# Patient Record
Sex: FEMALE | Race: OTHER | Employment: FULL TIME | URBAN - METROPOLITAN AREA
[De-identification: names, ages, dates, MRNs, and addresses within clinical notes are randomized per-mention and may not be internally consistent; named-entity substitution may affect disease eponyms.]

---

## 2017-01-16 ENCOUNTER — HOSPITAL ENCOUNTER (EMERGENCY)
Facility: HOSPITAL | Age: 47
Discharge: HOME | End: 2017-01-16
Attending: EMERGENCY MEDICINE
Payer: MEDICAID

## 2017-01-16 ENCOUNTER — APPOINTMENT (OUTPATIENT)
Dept: GENERAL RADIOLOGY | Facility: HOSPITAL | Age: 47
End: 2017-01-16
Attending: EMERGENCY MEDICINE
Payer: MEDICAID

## 2017-01-16 VITALS
BODY MASS INDEX: 36.96 KG/M2 | WEIGHT: 230 LBS | DIASTOLIC BLOOD PRESSURE: 66 MMHG | SYSTOLIC BLOOD PRESSURE: 102 MMHG | HEIGHT: 66 IN | TEMPERATURE: 98 F | OXYGEN SATURATION: 99 % | RESPIRATION RATE: 16 BRPM | HEART RATE: 68 BPM

## 2017-01-16 DIAGNOSIS — G56.02 CARPAL TUNNEL SYNDROME OF LEFT WRIST: ICD-10-CM

## 2017-01-16 DIAGNOSIS — M54.12 CERVICAL RADICULOPATHY: Primary | ICD-10-CM

## 2017-01-16 LAB
ALBUMIN SERPL-MCNC: 4.1 G/DL (ref 3.5–4.8)
ALP LIVER SERPL-CCNC: 87 U/L (ref 39–100)
ALT SERPL-CCNC: 21 U/L (ref 14–54)
AST SERPL-CCNC: 15 U/L (ref 15–41)
ATRIAL RATE: 78 BPM
BASOPHILS # BLD AUTO: 0.05 X10(3) UL (ref 0–0.1)
BASOPHILS NFR BLD AUTO: 0.5 %
BILIRUB SERPL-MCNC: 0.4 MG/DL (ref 0.1–2)
BUN BLD-MCNC: 9 MG/DL (ref 8–20)
CALCIUM BLD-MCNC: 9.1 MG/DL (ref 8.3–10.3)
CHLORIDE: 103 MMOL/L (ref 101–111)
CO2: 27 MMOL/L (ref 22–32)
CREAT BLD-MCNC: 0.72 MG/DL (ref 0.55–1.02)
EOSINOPHIL # BLD AUTO: 0.15 X10(3) UL (ref 0–0.3)
EOSINOPHIL NFR BLD AUTO: 1.4 %
ERYTHROCYTE [DISTWIDTH] IN BLOOD BY AUTOMATED COUNT: 13.8 % (ref 11.5–16)
GLUCOSE BLD-MCNC: 111 MG/DL (ref 70–99)
HCT VFR BLD AUTO: 40.4 % (ref 34–50)
HGB BLD-MCNC: 13.8 G/DL (ref 12–16)
IMMATURE GRANULOCYTE COUNT: 0.03 X10(3) UL (ref 0–1)
IMMATURE GRANULOCYTE RATIO %: 0.3 %
LYMPHOCYTES # BLD AUTO: 2.14 X10(3) UL (ref 0.9–4)
LYMPHOCYTES NFR BLD AUTO: 19.3 %
M PROTEIN MFR SERPL ELPH: 8.4 G/DL (ref 6.1–8.3)
MCH RBC QN AUTO: 28.2 PG (ref 27–33.2)
MCHC RBC AUTO-ENTMCNC: 34.2 G/DL (ref 31–37)
MCV RBC AUTO: 82.4 FL (ref 81–100)
MONOCYTES # BLD AUTO: 1.08 X10(3) UL (ref 0.1–0.6)
MONOCYTES NFR BLD AUTO: 9.7 %
NEUTROPHIL ABS PRELIM: 7.65 X10 (3) UL (ref 1.3–6.7)
NEUTROPHILS # BLD AUTO: 7.65 X10(3) UL (ref 1.3–6.7)
NEUTROPHILS NFR BLD AUTO: 68.8 %
P AXIS: 42 DEGREES
P-R INTERVAL: 148 MS
PLATELET # BLD AUTO: 267 10(3)UL (ref 150–450)
POTASSIUM SERPL-SCNC: 3.6 MMOL/L (ref 3.6–5.1)
Q-T INTERVAL: 396 MS
QRS DURATION: 84 MS
QTC CALCULATION (BEZET): 451 MS
R AXIS: 67 DEGREES
RBC # BLD AUTO: 4.9 X10(6)UL (ref 3.8–5.1)
RED CELL DISTRIBUTION WIDTH-SD: 41.1 FL (ref 35.1–46.3)
SODIUM SERPL-SCNC: 139 MMOL/L (ref 136–144)
T AXIS: 44 DEGREES
TROPONIN: <0.046 NG/ML (ref ?–0.05)
VENTRICULAR RATE: 78 BPM
WBC # BLD AUTO: 11.1 X10(3) UL (ref 4–13)

## 2017-01-16 PROCEDURE — 85025 COMPLETE CBC W/AUTO DIFF WBC: CPT | Performed by: EMERGENCY MEDICINE

## 2017-01-16 PROCEDURE — 96375 TX/PRO/DX INJ NEW DRUG ADDON: CPT

## 2017-01-16 PROCEDURE — 99285 EMERGENCY DEPT VISIT HI MDM: CPT

## 2017-01-16 PROCEDURE — 93010 ELECTROCARDIOGRAM REPORT: CPT

## 2017-01-16 PROCEDURE — 93005 ELECTROCARDIOGRAM TRACING: CPT

## 2017-01-16 PROCEDURE — 80053 COMPREHEN METABOLIC PANEL: CPT | Performed by: EMERGENCY MEDICINE

## 2017-01-16 PROCEDURE — 96374 THER/PROPH/DIAG INJ IV PUSH: CPT

## 2017-01-16 PROCEDURE — 71010 XR CHEST AP PORTABLE  (CPT=71010): CPT

## 2017-01-16 PROCEDURE — 84484 ASSAY OF TROPONIN QUANT: CPT | Performed by: EMERGENCY MEDICINE

## 2017-01-16 RX ORDER — HYDROCODONE BITARTRATE AND ACETAMINOPHEN 5; 325 MG/1; MG/1
1 TABLET ORAL EVERY 6 HOURS PRN
Qty: 10 TABLET | Refills: 0 | Status: ON HOLD | OUTPATIENT
Start: 2017-01-16 | End: 2017-05-24

## 2017-01-16 RX ORDER — KETOROLAC TROMETHAMINE 10 MG/1
10 TABLET, FILM COATED ORAL EVERY 6 HOURS PRN
Qty: 20 TABLET | Refills: 0 | Status: SHIPPED | OUTPATIENT
Start: 2017-01-16 | End: 2017-01-21

## 2017-01-16 RX ORDER — ASPIRIN 81 MG/1
324 TABLET, CHEWABLE ORAL ONCE
Status: COMPLETED | OUTPATIENT
Start: 2017-01-16 | End: 2017-01-16

## 2017-01-16 RX ORDER — KETOROLAC TROMETHAMINE 30 MG/ML
30 INJECTION, SOLUTION INTRAMUSCULAR; INTRAVENOUS ONCE
Status: COMPLETED | OUTPATIENT
Start: 2017-01-16 | End: 2017-01-16

## 2017-01-16 RX ORDER — HYDROMORPHONE HYDROCHLORIDE 1 MG/ML
0.5 INJECTION, SOLUTION INTRAMUSCULAR; INTRAVENOUS; SUBCUTANEOUS ONCE
Status: COMPLETED | OUTPATIENT
Start: 2017-01-16 | End: 2017-01-16

## 2017-01-16 NOTE — ED PROVIDER NOTES
Patient Seen in: [facility] Emergency Department    History   Patient presents with:  Chest Pain Angina (cardiovascular)    Stated Complaint: cp    HPI    47-year-old female who presents her to the emergency room complaining having left shoulder and complaint: cp  Other systems are as noted in HPI. Constitutional and vital signs reviewed. All other systems reviewed and negative except as noted above. PSFH elements reviewed from today and agreed except as otherwise stated in HPI.     Physical E following:     Glucose 111 (*)     Total Protein 8.4 (*)     All other components within normal limits   CBC W/ DIFFERENTIAL - Abnormal; Notable for the following:     Neutrophil Absolute Prelim 7.65 (*)     Neutrophil Absolute 7.65 (*)     Monocyte Absolu medications. Discharged in good condition.     Disposition and Plan     Clinical Impression:  Cervical radiculopathy  (primary encounter diagnosis)  Carpal tunnel syndrome of left wrist    Disposition:  Discharge    Follow-up:  Curtis Mack MD  [address]

## 2017-01-16 NOTE — ED INITIAL ASSESSMENT (HPI)
Pt states left sided cp, shoulder and left arm pain/numbness, pt states she works at a [employer] home and doesn't remember if she lifted anything heavy

## 2017-05-23 ENCOUNTER — SURGERY (OUTPATIENT)
Age: 47
End: 2017-05-23

## 2017-05-23 ENCOUNTER — HOSPITAL ENCOUNTER (OUTPATIENT)
Facility: HOSPITAL | Age: 47
Setting detail: OBSERVATION
Discharge: HOME | End: 2017-05-24
Attending: EMERGENCY MEDICINE | Admitting: HOSPITALIST
Payer: MEDICAID

## 2017-05-23 ENCOUNTER — ANESTHESIA EVENT (OUTPATIENT)
Dept: SURGERY | Facility: HOSPITAL | Age: 47
End: 2017-05-23
Payer: MEDICAID

## 2017-05-23 ENCOUNTER — APPOINTMENT (OUTPATIENT)
Dept: ULTRASOUND IMAGING | Facility: HOSPITAL | Age: 47
End: 2017-05-23
Attending: EMERGENCY MEDICINE
Payer: MEDICAID

## 2017-05-23 ENCOUNTER — ANESTHESIA (OUTPATIENT)
Dept: SURGERY | Facility: HOSPITAL | Age: 47
End: 2017-05-23
Payer: MEDICAID

## 2017-05-23 DIAGNOSIS — K80.50 BILIARY COLIC: Primary | ICD-10-CM

## 2017-05-23 DIAGNOSIS — R10.9 INTRACTABLE ABDOMINAL PAIN: ICD-10-CM

## 2017-05-23 PROCEDURE — 99220 INITIAL OBSERVATION CARE,LEVL III: CPT | Performed by: HOSPITALIST

## 2017-05-23 PROCEDURE — 76700 US EXAM ABDOM COMPLETE: CPT | Performed by: EMERGENCY MEDICINE

## 2017-05-23 PROCEDURE — 0FT44ZZ RESECTION OF GALLBLADDER, PERCUTANEOUS ENDOSCOPIC APPROACH: ICD-10-PCS | Performed by: SURGERY

## 2017-05-23 RX ORDER — ALPRAZOLAM 0.25 MG/1
0.25 TABLET ORAL NIGHTLY PRN
Status: DISCONTINUED | OUTPATIENT
Start: 2017-05-23 | End: 2017-05-24

## 2017-05-23 RX ORDER — BISACODYL 10 MG
10 SUPPOSITORY, RECTAL RECTAL
Status: DISCONTINUED | OUTPATIENT
Start: 2017-05-23 | End: 2017-05-24

## 2017-05-23 RX ORDER — ZOLPIDEM TARTRATE 5 MG/1
5 TABLET ORAL NIGHTLY PRN
Status: DISCONTINUED | OUTPATIENT
Start: 2017-05-23 | End: 2017-05-24

## 2017-05-23 RX ORDER — ONDANSETRON 2 MG/ML
INJECTION INTRAMUSCULAR; INTRAVENOUS
Status: COMPLETED
Start: 2017-05-23 | End: 2017-05-23

## 2017-05-23 RX ORDER — ONDANSETRON 2 MG/ML
4 INJECTION INTRAMUSCULAR; INTRAVENOUS ONCE
Status: DISCONTINUED | OUTPATIENT
Start: 2017-05-23 | End: 2017-05-23 | Stop reason: HOSPADM

## 2017-05-23 RX ORDER — POLYETHYLENE GLYCOL 3350 17 G/17G
17 POWDER, FOR SOLUTION ORAL DAILY PRN
Status: DISCONTINUED | OUTPATIENT
Start: 2017-05-23 | End: 2017-05-24

## 2017-05-23 RX ORDER — ONDANSETRON 2 MG/ML
4 INJECTION INTRAMUSCULAR; INTRAVENOUS EVERY 6 HOURS PRN
Status: DISCONTINUED | OUTPATIENT
Start: 2017-05-23 | End: 2017-05-24

## 2017-05-23 RX ORDER — DIPHENHYDRAMINE HYDROCHLORIDE 50 MG/ML
25 INJECTION INTRAMUSCULAR; INTRAVENOUS EVERY 4 HOURS PRN
Status: DISCONTINUED | OUTPATIENT
Start: 2017-05-23 | End: 2017-05-23

## 2017-05-23 RX ORDER — NALOXONE HYDROCHLORIDE 0.4 MG/ML
80 INJECTION, SOLUTION INTRAMUSCULAR; INTRAVENOUS; SUBCUTANEOUS AS NEEDED
Status: DISCONTINUED | OUTPATIENT
Start: 2017-05-23 | End: 2017-05-23 | Stop reason: HOSPADM

## 2017-05-23 RX ORDER — HYDROMORPHONE HYDROCHLORIDE 1 MG/ML
0.5 INJECTION, SOLUTION INTRAMUSCULAR; INTRAVENOUS; SUBCUTANEOUS EVERY 2 HOUR PRN
Status: DISCONTINUED | OUTPATIENT
Start: 2017-05-23 | End: 2017-05-24

## 2017-05-23 RX ORDER — DEXTROSE MONOHYDRATE 25 G/50ML
50 INJECTION, SOLUTION INTRAVENOUS
Status: DISCONTINUED | OUTPATIENT
Start: 2017-05-23 | End: 2017-05-23 | Stop reason: HOSPADM

## 2017-05-23 RX ORDER — MIDAZOLAM HYDROCHLORIDE 1 MG/ML
1 INJECTION INTRAMUSCULAR; INTRAVENOUS EVERY 5 MIN PRN
Status: DISCONTINUED | OUTPATIENT
Start: 2017-05-23 | End: 2017-05-23 | Stop reason: HOSPADM

## 2017-05-23 RX ORDER — DOCUSATE SODIUM 100 MG/1
100 CAPSULE, LIQUID FILLED ORAL 2 TIMES DAILY
Status: DISCONTINUED | OUTPATIENT
Start: 2017-05-23 | End: 2017-05-24

## 2017-05-23 RX ORDER — LEVOTHYROXINE SODIUM 0.05 MG/1
50 TABLET ORAL DAILY
Status: DISCONTINUED | OUTPATIENT
Start: 2017-05-23 | End: 2017-05-24

## 2017-05-23 RX ORDER — MEPERIDINE HYDROCHLORIDE 25 MG/ML
12.5 INJECTION INTRAMUSCULAR; INTRAVENOUS; SUBCUTANEOUS AS NEEDED
Status: DISCONTINUED | OUTPATIENT
Start: 2017-05-23 | End: 2017-05-23 | Stop reason: HOSPADM

## 2017-05-23 RX ORDER — ONDANSETRON 2 MG/ML
4 INJECTION INTRAMUSCULAR; INTRAVENOUS EVERY 4 HOURS PRN
Status: CANCELLED | OUTPATIENT
Start: 2017-05-23

## 2017-05-23 RX ORDER — LABETALOL HYDROCHLORIDE 5 MG/ML
10 INJECTION, SOLUTION INTRAVENOUS EVERY 4 HOURS PRN
Status: DISCONTINUED | OUTPATIENT
Start: 2017-05-23 | End: 2017-05-24

## 2017-05-23 RX ORDER — LIDOCAINE HYDROCHLORIDE 10 MG/ML
INJECTION, SOLUTION INFILTRATION; PERINEURAL AS NEEDED
Status: DISCONTINUED | OUTPATIENT
Start: 2017-05-23 | End: 2017-05-23 | Stop reason: HOSPADM

## 2017-05-23 RX ORDER — HYDROMORPHONE HYDROCHLORIDE 1 MG/ML
0.4 INJECTION, SOLUTION INTRAMUSCULAR; INTRAVENOUS; SUBCUTANEOUS EVERY 5 MIN PRN
Status: DISCONTINUED | OUTPATIENT
Start: 2017-05-23 | End: 2017-05-23 | Stop reason: HOSPADM

## 2017-05-23 RX ORDER — HYDROMORPHONE HYDROCHLORIDE 1 MG/ML
INJECTION, SOLUTION INTRAMUSCULAR; INTRAVENOUS; SUBCUTANEOUS
Status: COMPLETED
Start: 2017-05-23 | End: 2017-05-23

## 2017-05-23 RX ORDER — ONDANSETRON 2 MG/ML
4 INJECTION INTRAMUSCULAR; INTRAVENOUS ONCE
Status: COMPLETED | OUTPATIENT
Start: 2017-05-23 | End: 2017-05-23

## 2017-05-23 RX ORDER — INSULIN ASPART 100 [IU]/ML
INJECTION, SOLUTION INTRAVENOUS; SUBCUTANEOUS ONCE
Status: DISCONTINUED | OUTPATIENT
Start: 2017-05-23 | End: 2017-05-23 | Stop reason: HOSPADM

## 2017-05-23 RX ORDER — SODIUM PHOSPHATE, DIBASIC AND SODIUM PHOSPHATE, MONOBASIC 7; 19 G/133ML; G/133ML
1 ENEMA RECTAL ONCE AS NEEDED
Status: DISCONTINUED | OUTPATIENT
Start: 2017-05-23 | End: 2017-05-24

## 2017-05-23 RX ORDER — HYDROCODONE BITARTRATE AND ACETAMINOPHEN 10; 325 MG/1; MG/1
1 TABLET ORAL AS NEEDED
Status: DISCONTINUED | OUTPATIENT
Start: 2017-05-23 | End: 2017-05-23 | Stop reason: HOSPADM

## 2017-05-23 RX ORDER — SODIUM CHLORIDE 9 MG/ML
INJECTION, SOLUTION INTRAVENOUS CONTINUOUS
Status: CANCELLED | OUTPATIENT
Start: 2017-05-23 | End: 2017-05-23

## 2017-05-23 RX ORDER — SODIUM CHLORIDE, SODIUM LACTATE, POTASSIUM CHLORIDE, CALCIUM CHLORIDE 600; 310; 30; 20 MG/100ML; MG/100ML; MG/100ML; MG/100ML
INJECTION, SOLUTION INTRAVENOUS CONTINUOUS
Status: DISCONTINUED | OUTPATIENT
Start: 2017-05-23 | End: 2017-05-24

## 2017-05-23 RX ORDER — HYDROMORPHONE HYDROCHLORIDE 1 MG/ML
0.5 INJECTION, SOLUTION INTRAMUSCULAR; INTRAVENOUS; SUBCUTANEOUS EVERY 30 MIN PRN
Status: CANCELLED | OUTPATIENT
Start: 2017-05-23 | End: 2017-05-23

## 2017-05-23 RX ORDER — ENOXAPARIN SODIUM 100 MG/ML
40 INJECTION SUBCUTANEOUS DAILY
Status: DISCONTINUED | OUTPATIENT
Start: 2017-05-24 | End: 2017-05-24

## 2017-05-23 RX ORDER — HYDROCODONE BITARTRATE AND ACETAMINOPHEN 10; 325 MG/1; MG/1
2 TABLET ORAL AS NEEDED
Status: DISCONTINUED | OUTPATIENT
Start: 2017-05-23 | End: 2017-05-23 | Stop reason: HOSPADM

## 2017-05-23 RX ORDER — BUPIVACAINE HYDROCHLORIDE 5 MG/ML
INJECTION, SOLUTION EPIDURAL; INTRACAUDAL AS NEEDED
Status: DISCONTINUED | OUTPATIENT
Start: 2017-05-23 | End: 2017-05-23 | Stop reason: HOSPADM

## 2017-05-23 RX ORDER — DEXTROSE, SODIUM CHLORIDE, AND POTASSIUM CHLORIDE 5; .45; .15 G/100ML; G/100ML; G/100ML
INJECTION INTRAVENOUS CONTINUOUS
Status: DISCONTINUED | OUTPATIENT
Start: 2017-05-23 | End: 2017-05-24

## 2017-05-23 RX ORDER — HYDROMORPHONE HYDROCHLORIDE 1 MG/ML
1 INJECTION, SOLUTION INTRAMUSCULAR; INTRAVENOUS; SUBCUTANEOUS EVERY 2 HOUR PRN
Status: DISCONTINUED | OUTPATIENT
Start: 2017-05-23 | End: 2017-05-24

## 2017-05-23 RX ORDER — DEXTROSE MONOHYDRATE 25 G/50ML
50 INJECTION, SOLUTION INTRAVENOUS
Status: DISCONTINUED | OUTPATIENT
Start: 2017-05-23 | End: 2017-05-24

## 2017-05-23 RX ORDER — SODIUM CHLORIDE 9 MG/ML
INJECTION, SOLUTION INTRAVENOUS CONTINUOUS
Status: DISCONTINUED | OUTPATIENT
Start: 2017-05-23 | End: 2017-05-24

## 2017-05-23 RX ORDER — DIPHENHYDRAMINE HYDROCHLORIDE 50 MG/ML
50 INJECTION INTRAMUSCULAR; INTRAVENOUS ONCE
Status: COMPLETED | OUTPATIENT
Start: 2017-05-23 | End: 2017-05-23

## 2017-05-23 RX ORDER — HYDROMORPHONE HYDROCHLORIDE 1 MG/ML
0.5 INJECTION, SOLUTION INTRAMUSCULAR; INTRAVENOUS; SUBCUTANEOUS EVERY 30 MIN PRN
Status: DISCONTINUED | OUTPATIENT
Start: 2017-05-23 | End: 2017-05-24

## 2017-05-23 RX ORDER — DIPHENHYDRAMINE HYDROCHLORIDE 50 MG/ML
INJECTION INTRAMUSCULAR; INTRAVENOUS
Status: COMPLETED
Start: 2017-05-23 | End: 2017-05-23

## 2017-05-23 NOTE — PROGRESS NOTES
5/23/17 PT LEFT UNIT FOR O.R. AT THIS TIME IN STABLE CONDITION. A+OX3. RA. STRICT NPO. URINE PREGNANCY TEST (PER O.R. STAFF REQUEST) STILL PENDING. PT LEFT UNIT WITH IVF INFUSING VIA PATENT IV SITE, SIGNED CONSENT, AND COMPLETED PREOP CHECKLIST.  FAMILY AT

## 2017-05-23 NOTE — CONSULTS
[facility]  Report of Consultation    [place] Ranks Patient Status:  Emergency    1/3/1970 MRN [id number]   Location [address] [facility] Attending Rikki Anglin MD   Hosp Day # 0 PCP Antonia Cleaning MD     [facility] for [facility] SERVICES, [facility] ([facility]) Encounter:  HYDROcodone-acetaminophen (NORCO) 5-325 MG Oral Tab Take 1 tablet by mouth every 6 (six) hours as needed for Pain. Disp: 10 tablet Rfl: 0   hydrochlorothiazide (HYDRODIURIL) 25 MG Oral Tab Take 25 mg by mouth daily.  Disp:  Rfl:    Vitamin D, Er common bile duct, pancreas, spleen, kidneys, IVC, and aorta. FINDINGS:  LIVER:  Fatty infiltration of liver is noted. BILIARY:  Normal appearing intrahepatic ducts, and common bile duct. Common bile duct diameter is 4 mm.  Gallstones in the gallbladder ne

## 2017-05-23 NOTE — ANESTHESIA POSTPROCEDURE EVALUATION
[facility] Patient Status:  Observation   Age/Gender 50year old female MRN [id number]   [facility] SURGERY Attending Kneny Jeffrey MD   Hosp Day # 0 PCP Paula Louis MD       Anesthesia Post-op Note    Procedure

## 2017-05-23 NOTE — CONSULTS
[facility] Pharmacy Note: Antimicrobial Weight Dose Adjustment for: Zosyn (piperacillin/tazobactam)    Emely Glaser is a 50year old female who has been prescribed Zosyn (piperacillin/tazobactam)3.375g q8 hours.   CrCl is estimated creatinine clearance is 77.5 mL

## 2017-05-23 NOTE — BRIEF OP NOTE
Pre-Operative Diagnosis: Cholecystitis [K81.9]     Post-Operative Diagnosis: Cholecystitis [K81.9]     Procedure Performed:   Procedure(s):  LAPAROSCOPIC CHOLECYSTECTOMY    Surgeon(s) and Role:     Angy Layne MD - Primary    Assistant(s):  Surgic

## 2017-05-23 NOTE — ED INITIAL ASSESSMENT (HPI)
Pt here with c/o generalized abdominal pain since midnight. Denies NVD or urinary sx at this time. Pt states she took Pepto bismal with relief from nausea.

## 2017-05-23 NOTE — CONSULTS
[facility]  [address] Patient Status:  Observation    1/3/1970 MRN [id number]   [facility] SURGERY Attending Darnell Smith MD   Hosp Day # 0 PCP Carmenza Goel MD     Reason for consult: medical management Prior to Encounter:  hydrochlorothiazide (HYDRODIURIL) 25 MG Oral Tab Take 25 mg by mouth daily. Disp:  Rfl:    Levothyroxine Sodium 50 MCG Oral Cap Take  by mouth. Disp:  Rfl:    alprazolam (XANAX) 0.25 MG Oral Tab Take 0.25 mg by mouth nightly as needed. TP  7.5       No results for input(s): PTP, INR in the last 72 hours. No results for input(s): TROP, CK in the last 72 hours. Imaging: Imaging data reviewed in Epic. ASSESSMENT / PLAN:     1. Biliary colic  1.  Patient to go to OR later today

## 2017-05-23 NOTE — PROGRESS NOTES
NURSING ADMISSION NOTE      Patient admitted via Cart  Oriented to room. Safety precautions initiated. Bed in low position. Call light in reach. PT ARRIVED TO ROOM FROM E.R. IN STABLE CONDITION. A+OX3. VITALS STABLE. STRICT NPO.  PT VOIDED UPON ARRIV

## 2017-05-23 NOTE — ANESTHESIA PREPROCEDURE EVALUATION
PRE-OP EVALUATION    Patient Name: Galileo Winkler    Pre-op Diagnosis: Cholecystitis [K81.9]    Procedure(s):  LAPAROSCOPIC CHOLECYSTECTOMY    Surgeon(s) and Role:     Maria Antonia Steele MD - Primary    Pre-op vitals reviewed.   Temp: 97.5 °F (36.4 °C)  Pulse Past Surgical History    ANESTH, SECTION                Smoking status: Former Smoker     Smokeless tobacco: Not on file    Comment: QUIT ABOUT 15 YEARS AGO PER PT REPORT    Alcohol Use: No       Drug Use: No     Available

## 2017-05-23 NOTE — ED PROVIDER NOTES
Patient Seen in: [facility] Emergency Department    History   Patient presents with:  Abdomen/Flank Pain (GI/)    Stated Complaint: ABD. PAIN    HPI    Patient is a pleasant 38-year-old female, presenting for evaluation of abdominal pain.   Lisandro Diamond Triage Vitals   BP 05/23/17 0624 150/85 mmHg   Pulse 05/23/17 0624 80   Resp 05/23/17 0624 20   Temp 05/23/17 0624 97.5 °F (36.4 °C)   Temp src 05/23/17 0624 Temporal   SpO2 05/23/17 0624 98 %   O2 Device 05/23/17 0624 None (Room air)       Current:/ DIFFERENTIAL WITH PLATELET.   Procedure                               Abnormality         Status                     ---------                               -----------         ------                     CBC W/ DIFFERENTIAL[[id number]]          Abnormal studies were reviewed and discussed with the patient, who has persistent discomfort. Patient remains acutely tender to palpation in the right upper abdomen. Case was discussed with Dr. Pilar Tripathi.   Patient will be admitted to facilitate expedited surgical int

## 2017-05-24 VITALS
HEIGHT: 65 IN | HEART RATE: 79 BPM | SYSTOLIC BLOOD PRESSURE: 130 MMHG | RESPIRATION RATE: 18 BRPM | OXYGEN SATURATION: 95 % | DIASTOLIC BLOOD PRESSURE: 84 MMHG | TEMPERATURE: 98 F | WEIGHT: 229.25 LBS | BODY MASS INDEX: 38.2 KG/M2

## 2017-05-24 PROCEDURE — 99225 SUBSEQUENT OBSERVATION CARE: CPT | Performed by: INTERNAL MEDICINE

## 2017-05-24 RX ORDER — HYDROCODONE BITARTRATE AND ACETAMINOPHEN 5; 325 MG/1; MG/1
1 TABLET ORAL EVERY 4 HOURS PRN
Status: DISCONTINUED | OUTPATIENT
Start: 2017-05-24 | End: 2017-05-24

## 2017-05-24 RX ORDER — ALPRAZOLAM 1 MG/1
1 TABLET ORAL 4 TIMES DAILY PRN
Status: DISCONTINUED | OUTPATIENT
Start: 2017-05-24 | End: 2017-05-24

## 2017-05-24 RX ORDER — HYDROCODONE BITARTRATE AND ACETAMINOPHEN 5; 325 MG/1; MG/1
1 TABLET ORAL EVERY 4 HOURS PRN
Qty: 20 TABLET | Refills: 0 | Status: SHIPPED | OUTPATIENT
Start: 2017-05-24 | End: 2017-06-03

## 2017-05-24 RX ORDER — HYDROCODONE BITARTRATE AND ACETAMINOPHEN 5; 325 MG/1; MG/1
2 TABLET ORAL EVERY 4 HOURS PRN
Status: DISCONTINUED | OUTPATIENT
Start: 2017-05-24 | End: 2017-05-24

## 2017-05-24 RX ORDER — LABETALOL HYDROCHLORIDE 5 MG/ML
10 INJECTION, SOLUTION INTRAVENOUS EVERY 4 HOURS PRN
Status: DISCONTINUED | OUTPATIENT
Start: 2017-05-24 | End: 2017-05-24

## 2017-05-24 RX ORDER — AMOXICILLIN AND CLAVULANATE POTASSIUM 875; 125 MG/1; MG/1
1 TABLET, FILM COATED ORAL 2 TIMES DAILY
Qty: 14 TABLET | Refills: 0 | Status: SHIPPED | OUTPATIENT
Start: 2017-05-24 | End: 2017-05-31

## 2017-05-24 NOTE — PROGRESS NOTES
NURSING ADMISSION NOTE      Patient admitted via Cart  Oriented to room. Safety precautions initiated. Bed in low position. Call light in reach. PT RETURNED TO ROOM FROM PACU AT THIS TIME IN STABLE CONDITION. A+OX3.  VITALS STABLE. RA. CPOX AND SCD'S

## 2017-05-24 NOTE — PROGRESS NOTES
NURSING DISCHARGE NOTE    Discharged pt via wheelchair to [facility]. Accompanied by hospital staff memeber. Belongings at hand. IV catheter d/c'd; catheter intact. Discharge instruction and education given to pt and verbalizes understanding.  Script g

## 2017-05-24 NOTE — PLAN OF CARE
Diabetes/Glucose Control    • Glucose maintained within prescribed range Progressing        PAIN - ADULT    • Verbalizes/displays adequate comfort level or patient's stated pain goal Progressing        Patient/Family Goals    • Patient/Family [facility]

## 2017-05-24 NOTE — OPERATIVE REPORT
[facility] ([facility]    PATIENT'S NAME: Sameera Bonilla   ATTENDING PHYSICIAN: Arsen Garcia M.D. OPERATING PHYSICIAN: Arsen Garcia M.D.    PATIENT ACCOUNT#:   [de-identified]    LOCATION:  [address] [place] [address]  MEDICAL RECORD #:   [id number]       DATE OF BIRTH:  [birth year] the infundibulum which was grasped and retracted laterally. The cystic duct and cystic artery were individually exposed and seen coursing into the gallbladder.   The cystic duct was Endoclipped twice on the proximal side and once on the distal side and div

## 2017-05-24 NOTE — PAYOR COMM NOTE
Attending Physician: Ewelina Infante MD    Review Type: ADMISSION   Reviewer: Val Colon       Date: May 24, 2017 - 12:00 PM  Payor: João Orosco  Authorization Number: [id number]/A  Admit date: 5/23/2017  6:14 AM         PREOPERATIVE DIAGNOSIS:  Acute cholecystiti MG/ML injection 1 mg     Date Action Dose Route User    5/24/2017 4540 Given 1 mg Intravenous Deloris Arriaga RN      HYDROmorphone HCl PF (DILAUDID) 1 MG/ML injection 0.4 mg     Date Action Dose Route User    5/23/2017 1436 Given 0.2 mg Intravenous Bonny Stein Notable for the following:     POC Glucose 138 (*)     All other components within normal limits   POCT GLUCOSE - Abnormal; Notable for the following:     POC Glucose 175 (*)     All other components within normal limits   POCT GLUCOSE - Abnormal; Notable

## 2017-05-24 NOTE — PLAN OF CARE
Diabetes/Glucose Control    • Glucose maintained within prescribed range Completed        PAIN - ADULT    • Verbalizes/displays adequate comfort level or patient's stated pain goal Completed        Patient/Family Goals    • Patient/Family Long Term Goal Co

## 2017-05-24 NOTE — PROGRESS NOTES
[facility]  Progress Note    Peggy Colunga Patient Status:  Observation    1/3/1970 MRN [id number]   [facility] 3NW-A Attending Laurie Partida MD   Hosp Day # 1 PCP Ro Ricks MD     Subjective:    Patient tolerating PO diet,

## 2017-05-24 NOTE — PROGRESS NOTES
[facility]  Progress Note     Ivelisse Links Patient Status:  Observation    1/3/1970 MRN [id number]   [facility] 3NW-A Attending No att. providers found   Hosp Day # 1 PCP Puja Haynes MD     Chief Complaint: abdominal pain BID  3. Hypothyroidism  4. HTN    Plan of care: DC today    Estimated date of discharge: today  Discharge is dependent on: surg clearance  At this point Ms. Nichole Parents is expected to be discharge to: home    Plan of care discussed with patient    Bhavesh Spence

## 2017-05-30 PROBLEM — K81.9 CHOLECYSTITIS: Status: ACTIVE | Noted: 2017-05-30

## 2017-12-03 ENCOUNTER — APPOINTMENT (OUTPATIENT)
Dept: GENERAL RADIOLOGY | Facility: HOSPITAL | Age: 47
End: 2017-12-03
Payer: OTHER MISCELLANEOUS

## 2017-12-03 ENCOUNTER — HOSPITAL ENCOUNTER (EMERGENCY)
Facility: HOSPITAL | Age: 47
Discharge: HOME | End: 2017-12-03
Payer: OTHER MISCELLANEOUS

## 2017-12-03 VITALS
BODY MASS INDEX: 35.36 KG/M2 | DIASTOLIC BLOOD PRESSURE: 72 MMHG | WEIGHT: 220 LBS | SYSTOLIC BLOOD PRESSURE: 138 MMHG | TEMPERATURE: 98 F | HEART RATE: 62 BPM | HEIGHT: 66 IN | RESPIRATION RATE: 18 BRPM | OXYGEN SATURATION: 99 %

## 2017-12-03 DIAGNOSIS — S50.12XA CONTUSION OF LEFT FOREARM, INITIAL ENCOUNTER: Primary | ICD-10-CM

## 2017-12-03 PROCEDURE — 99283 EMERGENCY DEPT VISIT LOW MDM: CPT

## 2017-12-03 PROCEDURE — 73090 X-RAY EXAM OF FOREARM: CPT

## 2017-12-03 RX ORDER — IBUPROFEN 600 MG/1
600 TABLET ORAL ONCE
Status: COMPLETED | OUTPATIENT
Start: 2017-12-03 | End: 2017-12-03

## 2017-12-03 RX ORDER — IBUPROFEN 600 MG/1
600 TABLET ORAL ONCE
Status: DISCONTINUED | OUTPATIENT
Start: 2017-12-03 | End: 2017-12-03

## 2017-12-03 NOTE — ED PROVIDER NOTES
Patient Seen in: [facility] Emergency Department    History   Patient presents with:  Upper Extremity Injury (musculoskeletal)    Stated Complaint: L arm injury at work    HPI    Patient presents with a left forearm contusion.   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   Extremity: Tenderness overlying the proximal ulna where there is some associated
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
## 2018-02-05 ENCOUNTER — OFFICE VISIT (OUTPATIENT)
Dept: FAMILY MEDICINE CLINIC | Facility: CLINIC | Age: 48
End: 2018-02-05

## 2018-02-05 VITALS
TEMPERATURE: 98 F | HEIGHT: 63.5 IN | SYSTOLIC BLOOD PRESSURE: 150 MMHG | RESPIRATION RATE: 16 BRPM | DIASTOLIC BLOOD PRESSURE: 96 MMHG | BODY MASS INDEX: 45.32 KG/M2 | OXYGEN SATURATION: 97 % | WEIGHT: 259 LBS | HEART RATE: 90 BPM

## 2018-02-05 DIAGNOSIS — I10 ESSENTIAL HYPERTENSION WITH GOAL BLOOD PRESSURE LESS THAN 130/80: ICD-10-CM

## 2018-02-05 DIAGNOSIS — L30.9 ECZEMA, UNSPECIFIED TYPE: ICD-10-CM

## 2018-02-05 DIAGNOSIS — F41.1 GENERALIZED ANXIETY DISORDER: ICD-10-CM

## 2018-02-05 DIAGNOSIS — M54.12 CERVICAL RADICULOPATHY: ICD-10-CM

## 2018-02-05 DIAGNOSIS — E03.9 ACQUIRED HYPOTHYROIDISM: ICD-10-CM

## 2018-02-05 DIAGNOSIS — M79.605 LOW BACK PAIN RADIATING TO LEFT LEG: ICD-10-CM

## 2018-02-05 DIAGNOSIS — M54.50 LOW BACK PAIN RADIATING TO LEFT LEG: ICD-10-CM

## 2018-02-05 DIAGNOSIS — E11.42 CONTROLLED TYPE 2 DIABETES MELLITUS WITH DIABETIC POLYNEUROPATHY, WITHOUT LONG-TERM CURRENT USE OF INSULIN (HCC): Primary | ICD-10-CM

## 2018-02-05 PROBLEM — E11.9 CONTROLLED TYPE 2 DIABETES MELLITUS WITHOUT COMPLICATION, WITHOUT LONG-TERM CURRENT USE OF INSULIN (HCC): Status: ACTIVE | Noted: 2018-02-05

## 2018-02-05 PROCEDURE — 99205 OFFICE O/P NEW HI 60 MIN: CPT | Performed by: FAMILY MEDICINE

## 2018-02-05 RX ORDER — ALPRAZOLAM 0.5 MG/1
0.5 TABLET ORAL 3 TIMES DAILY PRN
Qty: 60 TABLET | Refills: 0 | Status: SHIPPED | OUTPATIENT
Start: 2018-02-05 | End: 2018-03-07

## 2018-02-05 RX ORDER — TRAMADOL HYDROCHLORIDE 50 MG/1
50 TABLET ORAL EVERY 6 HOURS PRN
Qty: 60 TABLET | Refills: 0 | Status: SHIPPED | OUTPATIENT
Start: 2018-02-05 | End: 2018-03-07

## 2018-02-05 RX ORDER — CYCLOBENZAPRINE HCL 10 MG
10 TABLET ORAL
Refills: 3 | COMMUNITY
Start: 2017-12-19 | End: 2018-04-18

## 2018-02-05 RX ORDER — METOPROLOL SUCCINATE 25 MG/1
25 TABLET, EXTENDED RELEASE ORAL DAILY
COMMUNITY
End: 2018-10-12 | Stop reason: DRUGHIGH

## 2018-02-05 RX ORDER — IBUPROFEN 800 MG/1
800 TABLET ORAL 2 TIMES DAILY
Refills: 1 | COMMUNITY
Start: 2017-12-19 | End: 2019-02-21

## 2018-02-05 RX ORDER — LOSARTAN POTASSIUM 50 MG/1
50 TABLET ORAL
Refills: 6 | COMMUNITY
Start: 2017-12-19 | End: 2018-02-05 | Stop reason: DRUGHIGH

## 2018-02-05 RX ORDER — DULOXETIN HYDROCHLORIDE 30 MG/1
30 CAPSULE, DELAYED RELEASE ORAL
Refills: 6 | COMMUNITY
Start: 2017-12-19 | End: 2018-04-18

## 2018-02-05 RX ORDER — LOSARTAN POTASSIUM 100 MG/1
100 TABLET ORAL DAILY
Qty: 30 TABLET | Refills: 0 | COMMUNITY
Start: 2018-02-05 | End: 2018-08-02

## 2018-02-06 NOTE — PROGRESS NOTES
[facility] Family Medicine Office Note  Chief Complaint:   Patient presents with:  Medication Request      HPI:   This is a 46year old female coming in for DM2, HTN, hypothyroidism, anxiety, neck pain, low back pain, and eczema.     1.  DM2 - The Anxiety -patient states she experiences anxiety and panic attacks about 2-3 times per month. She has never been on medication on a daily basis as she feels that her anxiety is not that bad.   She would like to try an increased dose of Xanax for her panic a Tablet 24 Hr Take 25 mg by mouth daily. Disp:  Rfl:    MetFORMIN HCl 500 MG Oral Tab Take 500 mg by mouth 2 (two) times daily. Disp:  Rfl: 1   DULoxetine HCl 30 MG Oral Cap DR Particles Take 30 mg by mouth once daily.  Disp:  Rfl: 6   ALPRAZolam 0.5 MG Oral Height as of this encounter: 63.5\". Weight as of this encounter: 259 lb. Vital signs reviewed. Appears stated age, well groomed.   Physical Exam:  GEN:  Patient is alert and oriented x3, no apparent distress  HEAD:  Normocephalic, atraumatic  HEENT: Dispense: 30 tablet; Refill: 0    3. Acquired hypothyroidism  -  Recheck TSH and free T4  -  Adjust synthroid accordingly  - TSH+FREE T4; Future    4.  Cervical radiculopathy  -  Chronic  -  Check xray of C-spine to r/o foraminal stenosis  -  Recommended ag HCl 50 MG Oral Tab 60 tablet 0      Sig: Take 1 tablet (50 mg total) by mouth every 6 (six) hours as needed for Pain.      triamcinolone acetonide 0.1 % External Cream 60 g 3      Sig: Apply topically 2 (two) times daily as needed.            Health Mainten

## 2018-03-07 DIAGNOSIS — M79.605 LOW BACK PAIN RADIATING TO LEFT LEG: ICD-10-CM

## 2018-03-07 DIAGNOSIS — F41.1 GENERALIZED ANXIETY DISORDER: ICD-10-CM

## 2018-03-07 DIAGNOSIS — M54.12 CERVICAL RADICULOPATHY: ICD-10-CM

## 2018-03-07 DIAGNOSIS — M54.50 LOW BACK PAIN RADIATING TO LEFT LEG: ICD-10-CM

## 2018-03-08 RX ORDER — TRAMADOL HYDROCHLORIDE 50 MG/1
TABLET ORAL
Qty: 60 TABLET | Refills: 0 | Status: SHIPPED | OUTPATIENT
Start: 2018-03-08 | End: 2018-04-12

## 2018-03-08 RX ORDER — ALPRAZOLAM 0.5 MG/1
TABLET ORAL
Qty: 60 TABLET | Refills: 0 | Status: SHIPPED | OUTPATIENT
Start: 2018-03-08 | End: 2018-04-12

## 2018-03-22 ENCOUNTER — HOSPITAL ENCOUNTER (OUTPATIENT)
Dept: GENERAL RADIOLOGY | Age: 48
Discharge: HOME | End: 2018-03-22
Attending: FAMILY MEDICINE
Payer: MEDICAID

## 2018-03-22 DIAGNOSIS — M54.12 CERVICAL RADICULOPATHY: ICD-10-CM

## 2018-03-22 DIAGNOSIS — M54.50 LOW BACK PAIN RADIATING TO LEFT LEG: ICD-10-CM

## 2018-03-22 DIAGNOSIS — M79.605 LOW BACK PAIN RADIATING TO LEFT LEG: ICD-10-CM

## 2018-03-22 PROCEDURE — 72114 X-RAY EXAM L-S SPINE BENDING: CPT | Performed by: FAMILY MEDICINE

## 2018-03-22 PROCEDURE — 72052 X-RAY EXAM NECK SPINE 6/>VWS: CPT | Performed by: FAMILY MEDICINE

## 2018-04-12 ENCOUNTER — PATIENT MESSAGE (OUTPATIENT)
Dept: FAMILY MEDICINE CLINIC | Facility: CLINIC | Age: 48
End: 2018-04-12

## 2018-04-12 DIAGNOSIS — M79.605 LOW BACK PAIN RADIATING TO LEFT LEG: ICD-10-CM

## 2018-04-12 DIAGNOSIS — F41.1 GENERALIZED ANXIETY DISORDER: ICD-10-CM

## 2018-04-12 DIAGNOSIS — M54.12 CERVICAL RADICULOPATHY: ICD-10-CM

## 2018-04-12 DIAGNOSIS — M54.50 LOW BACK PAIN RADIATING TO LEFT LEG: ICD-10-CM

## 2018-04-13 ENCOUNTER — OFFICE VISIT (OUTPATIENT)
Dept: FAMILY MEDICINE CLINIC | Facility: CLINIC | Age: 48
End: 2018-04-13

## 2018-04-13 VITALS
WEIGHT: 259 LBS | HEART RATE: 108 BPM | BODY MASS INDEX: 45.32 KG/M2 | TEMPERATURE: 98 F | RESPIRATION RATE: 20 BRPM | SYSTOLIC BLOOD PRESSURE: 156 MMHG | HEIGHT: 63.5 IN | DIASTOLIC BLOOD PRESSURE: 88 MMHG

## 2018-04-13 DIAGNOSIS — M54.42 ACUTE MIDLINE LOW BACK PAIN WITH LEFT-SIDED SCIATICA: Primary | ICD-10-CM

## 2018-04-13 PROCEDURE — 99214 OFFICE O/P EST MOD 30 MIN: CPT | Performed by: FAMILY MEDICINE

## 2018-04-13 RX ORDER — PREDNISONE 20 MG/1
TABLET ORAL
Qty: 20 TABLET | Refills: 0 | Status: SHIPPED | OUTPATIENT
Start: 2018-04-13 | End: 2018-10-25 | Stop reason: ALTCHOICE

## 2018-04-13 NOTE — PROGRESS NOTES
[facility] Family Medicine Office Note  Chief Complaint:   Patient presents with:  Back Pain      HPI:   This is a 46year old female coming in for back pain. Pain is located at left low back, low back. Pain is described as sharp, shooting.  Carole TIMES DAILY AS NEEDED FOR ANXIETY  Pt taking 2 tabs at a time prn) Disp: 60 tablet Rfl: 0   TRAMADOL HCL 50 MG Oral Tab TAKE 1 TABLET BY MOUTH EVERY 6 HOURS AS NEEDED FOR PAIN Disp: 60 tablet Rfl: 0   aspirin 81 MG Oral Tab Take 81 mg by mouth daily.  Disp: encounter: 259 lb. Vital signs reviewed. Appears stated age, well groomed.   Physical Exam:  GEN:  Patient is alert and oriented x3, no apparent distress  HEAD:  Normocephalic, atraumatic  LUNGS: clear to auscultation bilaterally, no rales/rhonchi/wheezing Medical education done. Outcome: Patient verbalizes understanding. Patient is notified to call with any questions, complications, allergies, or worsening or changing symptoms.   Patient is to call with any side effects or complications from the treatments

## 2018-04-16 NOTE — TELEPHONE ENCOUNTER
From: Ozzie Palacios  Sent: 4/12/2018 9:33 PM CDT  Subject: Medication Renewal Request    hCao Castro.  Jaky Umaña would like a refill of the following medications:     ALPRAZOLAM 0.5 MG Oral Tab [LUZ ZAMORA, DO]     TRAMADOL HCL 50 MG Oral Tab [LUZ RIVERA

## 2018-04-17 RX ORDER — TRAMADOL HYDROCHLORIDE 50 MG/1
50 TABLET ORAL EVERY 6 HOURS PRN
Qty: 60 TABLET | Refills: 0
Start: 2018-04-17 | End: 2018-04-18

## 2018-04-17 RX ORDER — ALPRAZOLAM 0.5 MG/1
0.5 TABLET ORAL 3 TIMES DAILY PRN
Qty: 60 TABLET | Refills: 0
Start: 2018-04-17 | End: 2018-05-30

## 2018-04-18 ENCOUNTER — OFFICE VISIT (OUTPATIENT)
Dept: SURGERY | Facility: CLINIC | Age: 48
End: 2018-04-18

## 2018-04-18 VITALS
HEIGHT: 65.5 IN | WEIGHT: 220 LBS | BODY MASS INDEX: 36.21 KG/M2 | HEART RATE: 72 BPM | SYSTOLIC BLOOD PRESSURE: 144 MMHG | DIASTOLIC BLOOD PRESSURE: 82 MMHG

## 2018-04-18 DIAGNOSIS — G95.9 CERVICAL MYELOPATHY WITH CERVICAL RADICULOPATHY (HCC): Primary | ICD-10-CM

## 2018-04-18 DIAGNOSIS — M54.12 CERVICAL MYELOPATHY WITH CERVICAL RADICULOPATHY (HCC): Primary | ICD-10-CM

## 2018-04-18 DIAGNOSIS — M54.16 LUMBAR RADICULOPATHY, RIGHT: ICD-10-CM

## 2018-04-18 PROCEDURE — 99204 OFFICE O/P NEW MOD 45 MIN: CPT | Performed by: PHYSICIAN ASSISTANT

## 2018-04-18 RX ORDER — HYDROCODONE BITARTRATE AND ACETAMINOPHEN 10; 325 MG/1; MG/1
1 TABLET ORAL EVERY 4 HOURS PRN
Qty: 90 TABLET | Refills: 0 | Status: SHIPPED | OUTPATIENT
Start: 2018-04-18 | End: 2018-05-29

## 2018-04-18 RX ORDER — ATORVASTATIN CALCIUM 10 MG/1
1 TABLET, FILM COATED ORAL NIGHTLY
Refills: 0 | COMMUNITY
Start: 2018-04-13 | End: 2018-06-19

## 2018-04-18 RX ORDER — OXYCODONE HYDROCHLORIDE AND ACETAMINOPHEN 5; 325 MG/1; MG/1
1 TABLET ORAL EVERY 6 HOURS PRN
COMMUNITY
End: 2018-04-18 | Stop reason: CLARIF

## 2018-04-18 RX ORDER — CYCLOBENZAPRINE HCL 10 MG
10 TABLET ORAL 3 TIMES DAILY PRN
Qty: 60 TABLET | Refills: 3 | Status: SHIPPED | OUTPATIENT
Start: 2018-04-18 | End: 2018-11-25

## 2018-04-18 NOTE — H&P
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 She has a history of having neck problems on and off for a number of months.   Most recently she has had severe back spasms that star Dyspnea  [place]                 Hives    MEDICATIONS:    Current Outpatient Prescriptions on File Prior to Visit:  ALPRAZolam 0.5 MG Oral Tab Take 1 tablet (0.5 mg total) by mouth 3 (three) times daily as needed for Sleep.  Disp: 60 tablet Rfl: 0   predniS positions.     Upper extremity strength:       Deltoid    Triceps     Biceps        Wrist Extension  Finger extension Thumb Abduction  Thumb adduction Intrinsics   Right         4+        5         4+          4 4+ 4 4 5 4   Left         4+        5

## 2018-04-18 NOTE — PATIENT INSTRUCTIONS
Refill policies:    • Allow 2-3 business days for refills; controlled substances may take longer.   • Contact your pharmacy at least 5 days prior to running out of medication and have them send an electronic request or submit request through the [facility] for the entire amount billed. Precertification and Prior Authorizations  If your physician has recommended that you have a procedure or additional testing performed.   Judy ([facility]) will contact your insurance carrier to obtain pr

## 2018-04-18 NOTE — PROGRESS NOTES
Location of Pain: Lower back pain with radiation into left buttock, numbness and tingling to toes to left foot. No B/B dysfunction    Date Pain Began: Chronic but getting progressively worse.   Last few months the pain has become more severe, does not recal

## 2018-04-20 ENCOUNTER — PATIENT MESSAGE (OUTPATIENT)
Dept: SURGERY | Facility: CLINIC | Age: 48
End: 2018-04-20

## 2018-04-23 NOTE — TELEPHONE ENCOUNTER
From: Aga Coffey  To: Champ Loyd  Sent: 4/20/2018 3:46 PM CDT  Subject: Visit Follow-up Question    Hi. I call central scheduling for my MRI. They said I will need approval from my insurance when I go on 4/30/2018.  I have not received a lette

## 2018-04-30 ENCOUNTER — OFFICE VISIT (OUTPATIENT)
Dept: SURGERY | Facility: CLINIC | Age: 48
End: 2018-04-30

## 2018-04-30 ENCOUNTER — TELEPHONE (OUTPATIENT)
Dept: SURGERY | Facility: CLINIC | Age: 48
End: 2018-04-30

## 2018-04-30 VITALS — HEART RATE: 88 BPM | SYSTOLIC BLOOD PRESSURE: 150 MMHG | DIASTOLIC BLOOD PRESSURE: 86 MMHG

## 2018-04-30 DIAGNOSIS — M54.12 CERVICAL MYELOPATHY WITH CERVICAL RADICULOPATHY (HCC): Primary | ICD-10-CM

## 2018-04-30 DIAGNOSIS — G95.9 CERVICAL MYELOPATHY WITH CERVICAL RADICULOPATHY (HCC): Primary | ICD-10-CM

## 2018-04-30 DIAGNOSIS — M54.16 LUMBAR RADICULOPATHY, RIGHT: ICD-10-CM

## 2018-04-30 PROCEDURE — 99213 OFFICE O/P EST LOW 20 MIN: CPT | Performed by: PHYSICIAN ASSISTANT

## 2018-04-30 NOTE — PROGRESS NOTES
[facility] Neurosurgery follow up        Viola Ribeiro 1905 is a 46year old female here in follow-up. Patient has scheduled her MRI of the cervical lumbar spine for May 14. She took the oral steroids did help while she was on it.   She CHOLECYSTECTOMY     FAMILY HISTORY:  family history includes Cancer in her mother; Diabetes in her father and mother; Hypertension in her father and mother.     SOCIAL HISTORY:   reports that she has quit smoking.  She has never used smokeless tobacco. She No neck pain extension. Sensation to light touch is intact bilateral in both arms and legs, she does get intermittent numbness in the hands and feet. gait intact but slow. Positive new right clonus. Positive left Spurling's. Bilateral Mercado's.   Sheba Nolascor

## 2018-04-30 NOTE — PROGRESS NOTES
LOV 4/18/2018    Pt is here for follow up regarding neck and back pain,     Pt also has FMLA she would like filled out, this needs to be completed by today    Pain currently 8/10

## 2018-04-30 NOTE — PATIENT INSTRUCTIONS
Refill policies:    • Allow 2-3 business days for refills; controlled substances may take longer.   • Contact your pharmacy at least 5 days prior to running out of medication and have them send an electronic request or submit request through the “request re entire amount billed. Precertification and Prior Authorizations: If your physician has recommended that you have a procedure or additional testing performed.   [facility]) will contact your insurance carrier to obtain pre-certi

## 2018-05-04 NOTE — TELEPHONE ENCOUNTER
Rep called regarding FMLA, termination date was not placed on paperwork,     Reviewed FMLA, approximate date provided nothing further.

## 2018-05-13 ENCOUNTER — PATIENT OUTREACH (OUTPATIENT)
Dept: FAMILY MEDICINE CLINIC | Facility: CLINIC | Age: 48
End: 2018-05-13

## 2018-05-13 NOTE — PROGRESS NOTES
Pt has dx of HTN. Last office visit 02/2018- pt is due for med check/follow up HTN with Dr Summer Silva. Please have her schedule. Thanks.

## 2018-05-17 ENCOUNTER — APPOINTMENT (OUTPATIENT)
Dept: GENERAL RADIOLOGY | Facility: HOSPITAL | Age: 48
End: 2018-05-17
Attending: EMERGENCY MEDICINE
Payer: MEDICAID

## 2018-05-17 ENCOUNTER — PRIOR ORIGINAL RECORDS (OUTPATIENT)
Dept: OTHER | Age: 48
End: 2018-05-17

## 2018-05-17 ENCOUNTER — HOSPITAL ENCOUNTER (EMERGENCY)
Facility: HOSPITAL | Age: 48
Discharge: HOME | End: 2018-05-17
Attending: EMERGENCY MEDICINE
Payer: MEDICAID

## 2018-05-17 VITALS
BODY MASS INDEX: 36.65 KG/M2 | HEART RATE: 93 BPM | TEMPERATURE: 98 F | WEIGHT: 220 LBS | RESPIRATION RATE: 16 BRPM | DIASTOLIC BLOOD PRESSURE: 86 MMHG | OXYGEN SATURATION: 97 % | HEIGHT: 65 IN | SYSTOLIC BLOOD PRESSURE: 155 MMHG

## 2018-05-17 DIAGNOSIS — I47.1 PSVT (PAROXYSMAL SUPRAVENTRICULAR TACHYCARDIA) (HCC): Primary | ICD-10-CM

## 2018-05-17 PROCEDURE — 71045 X-RAY EXAM CHEST 1 VIEW: CPT | Performed by: EMERGENCY MEDICINE

## 2018-05-17 PROCEDURE — 84484 ASSAY OF TROPONIN QUANT: CPT | Performed by: EMERGENCY MEDICINE

## 2018-05-17 PROCEDURE — 96374 THER/PROPH/DIAG INJ IV PUSH: CPT

## 2018-05-17 PROCEDURE — 99285 EMERGENCY DEPT VISIT HI MDM: CPT

## 2018-05-17 PROCEDURE — 84439 ASSAY OF FREE THYROXINE: CPT | Performed by: EMERGENCY MEDICINE

## 2018-05-17 PROCEDURE — 96361 HYDRATE IV INFUSION ADD-ON: CPT

## 2018-05-17 PROCEDURE — 93005 ELECTROCARDIOGRAM TRACING: CPT

## 2018-05-17 PROCEDURE — 96375 TX/PRO/DX INJ NEW DRUG ADDON: CPT

## 2018-05-17 PROCEDURE — 93010 ELECTROCARDIOGRAM REPORT: CPT

## 2018-05-17 PROCEDURE — 80053 COMPREHEN METABOLIC PANEL: CPT | Performed by: EMERGENCY MEDICINE

## 2018-05-17 PROCEDURE — 84443 ASSAY THYROID STIM HORMONE: CPT | Performed by: EMERGENCY MEDICINE

## 2018-05-17 PROCEDURE — 83735 ASSAY OF MAGNESIUM: CPT | Performed by: EMERGENCY MEDICINE

## 2018-05-17 PROCEDURE — 85025 COMPLETE CBC W/AUTO DIFF WBC: CPT | Performed by: EMERGENCY MEDICINE

## 2018-05-17 RX ORDER — ONDANSETRON 2 MG/ML
4 INJECTION INTRAMUSCULAR; INTRAVENOUS
Status: DISCONTINUED | OUTPATIENT
Start: 2018-05-17 | End: 2018-05-17

## 2018-05-17 RX ORDER — SODIUM CHLORIDE 9 MG/ML
125 INJECTION, SOLUTION INTRAVENOUS CONTINUOUS
Status: DISCONTINUED | OUTPATIENT
Start: 2018-05-17 | End: 2018-05-17

## 2018-05-17 RX ORDER — METOPROLOL SUCCINATE 25 MG/1
25 TABLET, EXTENDED RELEASE ORAL ONCE
Status: COMPLETED | OUTPATIENT
Start: 2018-05-17 | End: 2018-05-17

## 2018-05-17 RX ORDER — ADENOSINE 3 MG/ML
6 INJECTION, SOLUTION INTRAVENOUS ONCE
Status: COMPLETED | OUTPATIENT
Start: 2018-05-17 | End: 2018-05-17

## 2018-05-17 RX ORDER — METOPROLOL SUCCINATE 25 MG/1
25 TABLET, EXTENDED RELEASE ORAL DAILY
Qty: 30 TABLET | Refills: 0 | Status: SHIPPED | OUTPATIENT
Start: 2018-05-17 | End: 2018-06-19

## 2018-05-17 RX ORDER — ADENOSINE 3 MG/ML
12 INJECTION, SOLUTION INTRAVENOUS ONCE
Status: COMPLETED | OUTPATIENT
Start: 2018-05-17 | End: 2018-05-17

## 2018-05-17 NOTE — ED NOTES
Received bedside report from jac CYR. Pt sitting up smiling on stretcher NSR denies cp or sob.  resps easy nonlabored

## 2018-05-17 NOTE — ED INITIAL ASSESSMENT (HPI)
Pt complaining of SOB, anxiety attack, started 1 hour PTA. States she took xanax, benadryl and metformin.  Pt is diaphoretic and very uneasy and restless on arrival.

## 2018-05-17 NOTE — ED PROVIDER NOTES
Patient Seen in: [facility] Emergency Department    History   Patient presents with: Anxiety/Panic attack (neurologic)    Stated Complaint:     HPI    Patient complains of feelings of a panic attack.   Patient states that she has had panic attacks f (36.4 °C) (Temporal)   Resp 16   Ht 165.1 cm (5' 5\")   Wt 99.8 kg   SpO2 97%   BMI 36.61 kg/m²         Physical Exam  General: The patient is awake, alert, conversant. Patient answers questions quickly and appropriately.   She appears anxious and is hyper ---------                               -----------         ------                     CBC W/ DIFFERENTIAL[[id number]]          Abnormal            Final result                 Please view results for these tests on the individual orders.    RAINBOW DRAW B this point, I believe she may be safely discharged home. I will discharge home on beta-blocker and recommend close follow-up with her primary care doctor and/or cardiology. I recommend she restart her thyroid medications and have rest and fluids.

## 2018-05-29 DIAGNOSIS — M54.42 ACUTE MIDLINE LOW BACK PAIN WITH LEFT-SIDED SCIATICA: ICD-10-CM

## 2018-05-29 DIAGNOSIS — F41.1 GENERALIZED ANXIETY DISORDER: ICD-10-CM

## 2018-05-29 RX ORDER — PREDNISONE 20 MG/1
TABLET ORAL
Qty: 20 TABLET | Refills: 0
Start: 2018-05-29

## 2018-05-29 RX ORDER — HYDROCODONE BITARTRATE AND ACETAMINOPHEN 10; 325 MG/1; MG/1
1 TABLET ORAL EVERY 4 HOURS PRN
Qty: 90 TABLET | Refills: 0 | Status: SHIPPED | OUTPATIENT
Start: 2018-05-29 | End: 2018-07-09

## 2018-05-29 RX ORDER — ALPRAZOLAM 0.5 MG/1
0.5 TABLET ORAL 3 TIMES DAILY PRN
Qty: 60 TABLET | Refills: 0
Start: 2018-05-29

## 2018-05-30 DIAGNOSIS — F41.1 GENERALIZED ANXIETY DISORDER: ICD-10-CM

## 2018-05-30 DIAGNOSIS — M54.42 ACUTE MIDLINE LOW BACK PAIN WITH LEFT-SIDED SCIATICA: ICD-10-CM

## 2018-05-30 RX ORDER — PREDNISONE 20 MG/1
TABLET ORAL
Qty: 20 TABLET | Refills: 0 | Status: CANCELLED
Start: 2018-05-30

## 2018-05-31 ENCOUNTER — PATIENT OUTREACH (OUTPATIENT)
Dept: FAMILY MEDICINE CLINIC | Facility: CLINIC | Age: 48
End: 2018-05-31

## 2018-05-31 DIAGNOSIS — M54.42 ACUTE MIDLINE LOW BACK PAIN WITH LEFT-SIDED SCIATICA: ICD-10-CM

## 2018-05-31 NOTE — PROGRESS NOTES
Please call pt to inquire if pt has had a colonoscopy in the last 10 years and if so who performed it (name and phone #). Please let Baylee Patton know so I can obtain the report.     If pt did not have a colonoscopy please advise them we will leave the FIT stoo

## 2018-06-01 RX ORDER — PREDNISONE 20 MG/1
TABLET ORAL
Qty: 20 TABLET | Refills: 0 | OUTPATIENT
Start: 2018-06-01

## 2018-06-05 NOTE — TELEPHONE ENCOUNTER
From: Kuldeep Wilks  Sent: 5/30/2018 1:28 PM CDT  Subject: Medication Renewal Request    Montrell Cardenas.  Nadia Martínez would like a refill of the following medications:     ALPRAZolam 0.5 MG Oral Tab [LUZ ZAMORA, ]     predniSONE 20 MG Oral Tab [LUZ NICOLE L

## 2018-06-05 NOTE — TELEPHONE ENCOUNTER
Last seen for meds 2/5  Seen again in April for back pain. Last refill on xanax 4/17  Please approve if appropriate. Thanks.

## 2018-06-06 RX ORDER — ALPRAZOLAM 0.5 MG/1
0.5 TABLET ORAL 3 TIMES DAILY PRN
Qty: 60 TABLET | Refills: 0 | Status: SHIPPED | OUTPATIENT
Start: 2018-06-06 | End: 2018-07-09

## 2018-06-12 ENCOUNTER — HOSPITAL ENCOUNTER (OUTPATIENT)
Dept: MRI IMAGING | Age: 48
Discharge: HOME | End: 2018-06-12
Attending: PHYSICIAN ASSISTANT
Payer: COMMERCIAL

## 2018-06-12 ENCOUNTER — TELEPHONE (OUTPATIENT)
Dept: SURGERY | Facility: CLINIC | Age: 48
End: 2018-06-12

## 2018-06-12 DIAGNOSIS — M54.16 LUMBAR RADICULOPATHY, RIGHT: ICD-10-CM

## 2018-06-12 DIAGNOSIS — E23.7 PITUITARY ABNORMALITY (HCC): Primary | ICD-10-CM

## 2018-06-12 DIAGNOSIS — G95.9 CERVICAL MYELOPATHY WITH CERVICAL RADICULOPATHY (HCC): ICD-10-CM

## 2018-06-12 DIAGNOSIS — M54.12 CERVICAL MYELOPATHY WITH CERVICAL RADICULOPATHY (HCC): ICD-10-CM

## 2018-06-12 PROCEDURE — 72141 MRI NECK SPINE W/O DYE: CPT | Performed by: PHYSICIAN ASSISTANT

## 2018-06-12 PROCEDURE — 72148 MRI LUMBAR SPINE W/O DYE: CPT | Performed by: PHYSICIAN ASSISTANT

## 2018-06-12 NOTE — TELEPHONE ENCOUNTER
MRI of the cervical spine found a enlarged pituitary the radiologist recommended MRI of the pituitary with and without contrast.    Results released to the patient with a my chart message indicating we can discuss it when she comes in her appointment also

## 2018-06-18 ENCOUNTER — APPOINTMENT (OUTPATIENT)
Dept: LAB | Age: 48
End: 2018-06-18
Attending: FAMILY MEDICINE
Payer: COMMERCIAL

## 2018-06-18 DIAGNOSIS — E11.42 CONTROLLED TYPE 2 DIABETES MELLITUS WITH DIABETIC POLYNEUROPATHY, WITHOUT LONG-TERM CURRENT USE OF INSULIN (HCC): ICD-10-CM

## 2018-06-18 DIAGNOSIS — E03.9 ACQUIRED HYPOTHYROIDISM: ICD-10-CM

## 2018-06-18 DIAGNOSIS — I10 ESSENTIAL HYPERTENSION WITH GOAL BLOOD PRESSURE LESS THAN 130/80: ICD-10-CM

## 2018-06-18 PROCEDURE — 82570 ASSAY OF URINE CREATININE: CPT | Performed by: FAMILY MEDICINE

## 2018-06-18 PROCEDURE — 84443 ASSAY THYROID STIM HORMONE: CPT | Performed by: FAMILY MEDICINE

## 2018-06-18 PROCEDURE — 83036 HEMOGLOBIN GLYCOSYLATED A1C: CPT | Performed by: FAMILY MEDICINE

## 2018-06-18 PROCEDURE — 36415 COLL VENOUS BLD VENIPUNCTURE: CPT | Performed by: FAMILY MEDICINE

## 2018-06-18 PROCEDURE — 82043 UR ALBUMIN QUANTITATIVE: CPT | Performed by: FAMILY MEDICINE

## 2018-06-18 PROCEDURE — 80061 LIPID PANEL: CPT | Performed by: FAMILY MEDICINE

## 2018-06-18 PROCEDURE — 80053 COMPREHEN METABOLIC PANEL: CPT | Performed by: FAMILY MEDICINE

## 2018-06-18 PROCEDURE — 84439 ASSAY OF FREE THYROXINE: CPT | Performed by: FAMILY MEDICINE

## 2018-06-19 ENCOUNTER — OFFICE VISIT (OUTPATIENT)
Dept: FAMILY MEDICINE CLINIC | Facility: CLINIC | Age: 48
End: 2018-06-19

## 2018-06-19 ENCOUNTER — TELEPHONE (OUTPATIENT)
Dept: FAMILY MEDICINE CLINIC | Facility: CLINIC | Age: 48
End: 2018-06-19

## 2018-06-19 ENCOUNTER — TELEPHONE (OUTPATIENT)
Dept: SURGERY | Facility: CLINIC | Age: 48
End: 2018-06-19

## 2018-06-19 ENCOUNTER — OFFICE VISIT (OUTPATIENT)
Dept: SURGERY | Facility: CLINIC | Age: 48
End: 2018-06-19

## 2018-06-19 VITALS
DIASTOLIC BLOOD PRESSURE: 88 MMHG | OXYGEN SATURATION: 99 % | BODY MASS INDEX: 43.31 KG/M2 | RESPIRATION RATE: 16 BRPM | HEART RATE: 76 BPM | HEIGHT: 63.5 IN | SYSTOLIC BLOOD PRESSURE: 138 MMHG | WEIGHT: 247.5 LBS

## 2018-06-19 VITALS — DIASTOLIC BLOOD PRESSURE: 80 MMHG | HEART RATE: 80 BPM | SYSTOLIC BLOOD PRESSURE: 140 MMHG

## 2018-06-19 DIAGNOSIS — I10 ESSENTIAL HYPERTENSION WITH GOAL BLOOD PRESSURE LESS THAN 130/80: ICD-10-CM

## 2018-06-19 DIAGNOSIS — R29.898 WEAKNESS OF BOTH LOWER EXTREMITIES: ICD-10-CM

## 2018-06-19 DIAGNOSIS — F41.1 GENERALIZED ANXIETY DISORDER: ICD-10-CM

## 2018-06-19 DIAGNOSIS — M50.00 CERVICAL DISC DISEASE WITH MYELOPATHY: Primary | ICD-10-CM

## 2018-06-19 DIAGNOSIS — E03.9 ACQUIRED HYPOTHYROIDISM: ICD-10-CM

## 2018-06-19 DIAGNOSIS — E01.0 THYROMEGALY: ICD-10-CM

## 2018-06-19 DIAGNOSIS — R29.898 WEAKNESS OF BOTH UPPER EXTREMITIES: ICD-10-CM

## 2018-06-19 DIAGNOSIS — E11.65 UNCONTROLLED TYPE 2 DIABETES MELLITUS WITH HYPERGLYCEMIA, WITHOUT LONG-TERM CURRENT USE OF INSULIN (HCC): Primary | ICD-10-CM

## 2018-06-19 DIAGNOSIS — R13.10 DYSPHAGIA, UNSPECIFIED TYPE: ICD-10-CM

## 2018-06-19 DIAGNOSIS — E11.9 CONTROLLED TYPE 2 DIABETES MELLITUS WITHOUT COMPLICATION, WITHOUT LONG-TERM CURRENT USE OF INSULIN (HCC): Primary | ICD-10-CM

## 2018-06-19 PROCEDURE — 99213 OFFICE O/P EST LOW 20 MIN: CPT | Performed by: PHYSICIAN ASSISTANT

## 2018-06-19 PROCEDURE — 99214 OFFICE O/P EST MOD 30 MIN: CPT | Performed by: FAMILY MEDICINE

## 2018-06-19 RX ORDER — ATORVASTATIN CALCIUM 20 MG/1
20 TABLET, FILM COATED ORAL NIGHTLY
Qty: 90 TABLET | Refills: 0 | Status: SHIPPED | OUTPATIENT
Start: 2018-06-19 | End: 2018-10-17

## 2018-06-19 RX ORDER — LEVOTHYROXINE SODIUM 0.07 MG/1
75 TABLET ORAL
Qty: 90 TABLET | Refills: 0 | Status: SHIPPED | OUTPATIENT
Start: 2018-06-19 | End: 2018-08-02

## 2018-06-19 NOTE — PROGRESS NOTES
[facility] Neurosurgery follow up        Viola Ribeiro 1905 is a 46year old female here in follow-up. Is in follow-up. States her neck pain is a 5/10. Her left arm pain is a 6/10.   Her thoracic and lumbar spasms are present but slightly not fallen. She denies any bowel or bladder incontinence. She saw her PCP and started on prednisone.   She tried taking the tramadol but created some confusion and fogginess.     PAST MEDICAL HISTORY:       Past Medical History:   Diagnosis Date   • Acid 4 4+ 4 4 5 4      Lower extremity strength:      Iliopsoas  Hamstrings   Quads    D-flexion P-flexion Eversion   Right       4         5       5         4+ 5 5   Left       4         5       5         4+ 5 5      Reflexes DTRs:      Biceps   Brachiorad

## 2018-06-19 NOTE — PATIENT INSTRUCTIONS
Refill policies:    • Allow 2-3 business days for refills; controlled substances may take longer.   • Contact your pharmacy at least 5 days prior to running out of medication and have them send an electronic request or submit request through the “request re entire amount billed. Precertification and Prior Authorizations: If your physician has recommended that you have a procedure or additional testing performed.   [facility]) will contact your insurance carrier to obtain pre-certi the pituitary June 28 and follow-up after that with Dr. Gisell Handley  4.   Physical therapy for cervical stenosis

## 2018-06-19 NOTE — PROGRESS NOTES
[facility] Family Medicine Office Note  Chief Complaint:   Patient presents with:  Diabetes: follow up   Hypertension: follow up      HPI:   This is a 46year old female coming in for DM2, HTN, hypothyroidism, anxiety and dysphagia.     1.  DM2 - Past Surgical History:  No date: ANESTH, SECTION  No date:   2017: CHOLECYSTECTOMY  Social History:  Smoking status: Former Smoker                                                              Packs/day: 0.00      Years: 0.00      Sm atorvastatin 10 MG Oral Tab Take 1 tablet by mouth nightly. Disp:  Rfl: 0   Cyclobenzaprine HCl 10 MG Oral Tab Take 1 tablet (10 mg total) by mouth 3 (three) times daily as needed for Muscle spasms.  At Bedtime Disp: 60 tablet Rfl: 3   losartan 100 MG Ora noted  BACK:  + lumbar spinous process tenderness, + tight bilateral lumbar paraspinal muscles  NEURO:  CN 2 - 12 grossly intact     ASSESSMENT AND PLAN:   1.  Uncontrolled type 2 diabetes mellitus with diabetic polyneuropathy, without long-term current use Risk Adult(1 of 1 - PPSV23) due on 01/03/1987  Pap Smear,3 Years due on 01/03/1999  Mammogram,1 Yr due on 06/04/2015  Influenza Vaccine(1) due on 09/01/2017  FIT Colorectal Screening due on 01/03/2018  Colonoscopy,10 Years due on 01/03/2018    Patient/Care

## 2018-06-19 NOTE — PROGRESS NOTES
Pt is here to follow up after imaging, will have the other imaging done on the 28th prior to appt on the 29th

## 2018-06-21 ENCOUNTER — TELEPHONE (OUTPATIENT)
Dept: FAMILY MEDICINE CLINIC | Facility: CLINIC | Age: 48
End: 2018-06-21

## 2018-06-21 ENCOUNTER — HOSPITAL ENCOUNTER (OUTPATIENT)
Dept: ULTRASOUND IMAGING | Facility: HOSPITAL | Age: 48
Discharge: HOME | End: 2018-06-21
Attending: FAMILY MEDICINE
Payer: COMMERCIAL

## 2018-06-21 DIAGNOSIS — R13.10 DYSPHAGIA, UNSPECIFIED TYPE: ICD-10-CM

## 2018-06-21 DIAGNOSIS — L30.9 ECZEMA, UNSPECIFIED TYPE: ICD-10-CM

## 2018-06-21 DIAGNOSIS — Z12.11 SCREENING FOR COLON CANCER: Primary | ICD-10-CM

## 2018-06-21 DIAGNOSIS — E01.0 THYROMEGALY: ICD-10-CM

## 2018-06-21 PROCEDURE — 76536 US EXAM OF HEAD AND NECK: CPT | Performed by: FAMILY MEDICINE

## 2018-06-21 NOTE — TELEPHONE ENCOUNTER
Pt calling for refill of Xanax 0.5 mg may take one tab tid as needed. Last refill 6/6/18 #60 with no refill. Save for PCP to return 6/25/18.

## 2018-06-21 NOTE — TELEPHONE ENCOUNTER
From: Kuldeep Wilks  Sent: 6/21/2018 3:50 PM CDT  Subject: Medication Renewal Request    Montrell Ronald.  Nadia Martínez would like a refill of the following medications:     triamcinolone acetonide 0.1 % External Cream [LUZ ZAMORA, DO]    Preferred pharmacy: [facility]

## 2018-06-23 NOTE — TELEPHONE ENCOUNTER
Please call patient to see why she ran out of xanax so quickly. If she is using it two to three times per day, I need to see her back in your office to better control.

## 2018-06-25 NOTE — TELEPHONE ENCOUNTER
Call back from pt-discussed dr johns's question re xanax. Pt sts did not know about 6/6/18 prescription. sts will  today. Call to [facility]-confirms they received 6/6/18 xanax prescription-has been ready for pt to .

## 2018-06-25 NOTE — TELEPHONE ENCOUNTER
rcvd Disability form that Katy filled out on 6/19/18. Needs to have the Dr Brown Pichardo the form along w/the PA.  Placed in nurses bin

## 2018-06-25 NOTE — TELEPHONE ENCOUNTER
Call back from pt this afternoon stating she is supposed to  FIT test cards. Last ofc visit sts:   \"FIT Colorectal Screening due on 01/03/2018  Colonoscopy,10 Years due on 01/03/2018\". Advised will confirm w dr johns and call back w his input.

## 2018-06-25 NOTE — TELEPHONE ENCOUNTER
Call to pt's cell reaches identified # voice mail. Left m req call back to triage nurse today for question from dr johns. Provided ofc phone hours. (last ofc visit 6/19/18-\"experiences anxiety and panic attacks about 2-3 times per month.   She has never

## 2018-06-26 NOTE — TELEPHONE ENCOUNTER
Call to pt's cell reaches identified voice mail. Left vmm advising dr johns's preference is for colonoscopy but he's ok w FIT test.   Advised to call back if any other questions re this. FIT test order placed.

## 2018-06-27 DIAGNOSIS — Z12.11 COLON CANCER SCREENING: Primary | ICD-10-CM

## 2018-06-28 ENCOUNTER — HOSPITAL ENCOUNTER (OUTPATIENT)
Dept: MRI IMAGING | Age: 48
Discharge: HOME | End: 2018-06-28
Attending: PHYSICIAN ASSISTANT
Payer: COMMERCIAL

## 2018-06-28 ENCOUNTER — TELEPHONE (OUTPATIENT)
Dept: SURGERY | Facility: CLINIC | Age: 48
End: 2018-06-28

## 2018-06-28 DIAGNOSIS — E23.7 PITUITARY ABNORMALITY (HCC): ICD-10-CM

## 2018-06-28 PROCEDURE — A9576 INJ PROHANCE MULTIPACK: HCPCS | Performed by: PHYSICIAN ASSISTANT

## 2018-06-28 PROCEDURE — 70553 MRI BRAIN STEM W/O & W/DYE: CPT | Performed by: PHYSICIAN ASSISTANT

## 2018-06-28 NOTE — TELEPHONE ENCOUNTER
MRI Pit shows adenoma. I sent [employer] message to patient we can have her see Dr Alisa Frazier next Tuesday in oncology clinic  She is to confirm with us if she can make it.

## 2018-06-29 LAB
ALBUMIN: 3.5 G/DL
ALKALINE PHOSPHATATE(ALK PHOS): 85 IU/L
BILIRUBIN TOTAL: 0.4 MG/DL
BUN: 12 MG/DL
CALCIUM: 9.8 MG/DL
CHLORIDE: 106 MEQ/L
CREATININE, SERUM: 1.21 MG/DL
FREE T4: 0.8 MG/DL
GLUCOSE: 229 MG/DL
HEMATOCRIT: 42.7 %
HEMOGLOBIN: 14.1 G/DL
MAGNESIUM: 2.1 MG/DL
PLATELETS: 308 K/UL
POTASSIUM, SERUM: 4.4 MEQ/L
PROTEIN, TOTAL: 8 G/DL
RED BLOOD COUNT: 5.2 X 10-6/U
SGOT (AST): 40 IU/L
SGPT (ALT): 27 IU/L
SODIUM: 138 MEQ/L
THYROID STIMULATING HORMONE: 7.42 MLU/L
WHITE BLOOD COUNT: 12.8 X 10-3/U

## 2018-06-29 NOTE — TELEPHONE ENCOUNTER
Pt will drop off disability forms (No charge) needs to be completed and signed by Doctor Whitney Ayala. Office notes left at  for pt to .

## 2018-06-29 NOTE — TELEPHONE ENCOUNTER
Patient to be schedule in neuro-onc per EVERARDO Lees message below. May offer 7/3/18 at 11:30 am to be double booked.

## 2018-07-03 ENCOUNTER — NURSE ONLY (OUTPATIENT)
Dept: HEMATOLOGY/ONCOLOGY | Facility: HOSPITAL | Age: 48
End: 2018-07-03
Attending: NEUROLOGICAL SURGERY
Payer: COMMERCIAL

## 2018-07-03 VITALS
OXYGEN SATURATION: 97 % | TEMPERATURE: 97 F | RESPIRATION RATE: 18 BRPM | HEART RATE: 79 BPM | DIASTOLIC BLOOD PRESSURE: 88 MMHG | BODY MASS INDEX: 45.54 KG/M2 | HEIGHT: 63 IN | SYSTOLIC BLOOD PRESSURE: 141 MMHG | WEIGHT: 257 LBS

## 2018-07-03 PROCEDURE — 99211 OFF/OP EST MAY X REQ PHY/QHP: CPT

## 2018-07-03 NOTE — TELEPHONE ENCOUNTER
called pt to let her know that the envelope was here for p/u. She will be in today to p/u.  Placed in PSR bin

## 2018-07-09 DIAGNOSIS — M54.42 ACUTE MIDLINE LOW BACK PAIN WITH LEFT-SIDED SCIATICA: ICD-10-CM

## 2018-07-09 DIAGNOSIS — F41.1 GENERALIZED ANXIETY DISORDER: ICD-10-CM

## 2018-07-09 RX ORDER — HYDROCODONE BITARTRATE AND ACETAMINOPHEN 10; 325 MG/1; MG/1
1 TABLET ORAL EVERY 4 HOURS PRN
Qty: 90 TABLET | Refills: 0 | Status: SHIPPED | OUTPATIENT
Start: 2018-07-09 | End: 2018-08-12

## 2018-07-09 NOTE — TELEPHONE ENCOUNTER
Medication: Norco    Date of last refill: 5/29/18  [place]    Last office visit: 6/19/18  Due back to clinic per last office note:  4 weeks  Date next office visit scheduled:  7/17/18    Last OV note recommendatio

## 2018-07-09 NOTE — TELEPHONE ENCOUNTER
From: Rigoberto Ramírez  Sent: 7/9/2018 3:45 PM CDT  Subject: Medication Renewal Request    Tan Escalantekenyetta.  Daija Don would like a refill of the following medications:     HYDROcodone-acetaminophen  MG Oral Tab EVERARDO Mendoza]    Preferred pharmacy: [facility]

## 2018-07-10 ENCOUNTER — TELEPHONE (OUTPATIENT)
Dept: SURGERY | Facility: CLINIC | Age: 48
End: 2018-07-10

## 2018-07-11 RX ORDER — PREDNISONE 20 MG/1
TABLET ORAL
Qty: 20 TABLET | Refills: 0
Start: 2018-07-11

## 2018-07-11 NOTE — TELEPHONE ENCOUNTER
From: Luis Connor  Sent: 7/9/2018 3:45 PM CDT  Subject: Medication Renewal Request    Chong Cyr.  Modesta Perez would like a refill of the following medications:     predniSONE 20 MG Oral Tab [LUZ ZAMORA DO]     ALPRAZolam 0.5 MG Oral Tab [LUZ HENNESSY

## 2018-07-12 RX ORDER — ALPRAZOLAM 0.5 MG/1
0.5 TABLET ORAL 3 TIMES DAILY PRN
Qty: 60 TABLET | Refills: 0 | Status: SHIPPED | OUTPATIENT
Start: 2018-07-12 | End: 2018-08-16

## 2018-07-13 NOTE — TELEPHONE ENCOUNTER
Patient has upcoming appt 7/17/18 with Abner Beltrán. Will forward message on to [facility] [place], [place] to see if there are any recommendations prior to upcoming appointment.

## 2018-08-02 DIAGNOSIS — M54.42 ACUTE MIDLINE LOW BACK PAIN WITH LEFT-SIDED SCIATICA: ICD-10-CM

## 2018-08-02 DIAGNOSIS — I10 ESSENTIAL HYPERTENSION WITH GOAL BLOOD PRESSURE LESS THAN 130/80: ICD-10-CM

## 2018-08-02 DIAGNOSIS — E03.9 ACQUIRED HYPOTHYROIDISM: ICD-10-CM

## 2018-08-02 NOTE — TELEPHONE ENCOUNTER
From: Medina Alvarez  Sent: 8/2/2018 4:38 AM CDT  Subject: Medication Renewal Request    Marium Barbara.  Stefanie Him would like a refill of the following medications:     losartan 100 MG Oral Tab     predniSONE 20 MG Oral Tab [LUZ ZAMORA DO]     Levothyroxine Sodium 75 MCG Oral Tab [LUZ ZAMORA DO]     MetFORMIN HCl 1000 MG Oral Tab [LUZ ZAMORA DO]    Preferred pharmacy: [facility] [address] [place] [address], [address] [place] [address], [phone], [phone]

## 2018-08-03 RX ORDER — PREDNISONE 20 MG/1
TABLET ORAL
Qty: 20 TABLET | Refills: 0
Start: 2018-08-03

## 2018-08-03 RX ORDER — LEVOTHYROXINE SODIUM 0.07 MG/1
75 TABLET ORAL
Qty: 90 TABLET | Refills: 0 | Status: SHIPPED
Start: 2018-08-03 | End: 2018-08-16

## 2018-08-03 RX ORDER — LOSARTAN POTASSIUM 100 MG/1
100 TABLET ORAL DAILY
Qty: 90 TABLET | Refills: 0 | Status: SHIPPED
Start: 2018-08-03 | End: 2018-08-16

## 2018-08-12 DIAGNOSIS — L30.9 ECZEMA, UNSPECIFIED TYPE: ICD-10-CM

## 2018-08-12 DIAGNOSIS — F41.1 GENERALIZED ANXIETY DISORDER: ICD-10-CM

## 2018-08-12 DIAGNOSIS — M54.42 ACUTE MIDLINE LOW BACK PAIN WITH LEFT-SIDED SCIATICA: ICD-10-CM

## 2018-08-12 RX ORDER — HYDROCODONE BITARTRATE AND ACETAMINOPHEN 10; 325 MG/1; MG/1
1 TABLET ORAL EVERY 4 HOURS PRN
Qty: 90 TABLET | Refills: 0
Start: 2018-08-12

## 2018-08-12 RX ORDER — ALPRAZOLAM 0.5 MG/1
0.5 TABLET ORAL 3 TIMES DAILY PRN
Qty: 60 TABLET | Refills: 0
Start: 2018-08-12

## 2018-08-13 RX ORDER — HYDROCODONE BITARTRATE AND ACETAMINOPHEN 10; 325 MG/1; MG/1
1 TABLET ORAL EVERY 4 HOURS PRN
Qty: 90 TABLET | Refills: 0 | Status: SHIPPED | OUTPATIENT
Start: 2018-08-13 | End: 2018-08-24

## 2018-08-13 NOTE — TELEPHONE ENCOUNTER
Medication: Norco  Mg     Date of last refill: 7/9/18  90 Tabs     [place]  7/2/18     Last office visit: 6/19/18     Due back to clinic per last office note:  MRI of the pituitary June 28 and follow up a

## 2018-08-13 NOTE — TELEPHONE ENCOUNTER
From: Gosia Gandara  Sent: 8/12/2018 2:00 PM CDT  Subject: Medication Renewal Request    Rolf Dalton.  Pooja Leong would like a refill of the following medications:     HYDROcodone-acetaminophen  MG Oral Tab EVERARDO Hopper]    Preferred pharmacy: [facility]

## 2018-08-13 NOTE — TELEPHONE ENCOUNTER
rcvd disability form w/Co-signature from Dr Kev Vang. faxed to [address].  Sent to scanning

## 2018-08-15 DIAGNOSIS — M54.42 ACUTE MIDLINE LOW BACK PAIN WITH LEFT-SIDED SCIATICA: ICD-10-CM

## 2018-08-15 DIAGNOSIS — F41.1 GENERALIZED ANXIETY DISORDER: ICD-10-CM

## 2018-08-15 RX ORDER — PREDNISONE 20 MG/1
TABLET ORAL
Qty: 20 TABLET | Refills: 0
Start: 2018-08-15

## 2018-08-15 RX ORDER — ALPRAZOLAM 0.5 MG/1
0.5 TABLET ORAL 3 TIMES DAILY PRN
Qty: 60 TABLET | Refills: 0
Start: 2018-08-15

## 2018-08-15 NOTE — TELEPHONE ENCOUNTER
From: Heri East  Sent: 8/12/2018 2:00 PM CDT  Subject: Medication Renewal Request    Kathy Chris.  Raul Faith would like a refill of the following medications:     predniSONE 20 MG Oral Tab [LUZ ZAMORA DO]     ALPRAZolam 0.5 MG Oral Tab [LUZ ZAMORA, ]    Preferred pharmacy: [address] [place] [address], [phone], [phone]        Medication renewals requested in this message routed separately:     HYDROcodone-acetaminophen  MG Oral Tab EVERARDO De Leon]

## 2018-08-16 DIAGNOSIS — F41.1 GENERALIZED ANXIETY DISORDER: ICD-10-CM

## 2018-08-16 DIAGNOSIS — E03.9 ACQUIRED HYPOTHYROIDISM: ICD-10-CM

## 2018-08-16 DIAGNOSIS — I10 ESSENTIAL HYPERTENSION WITH GOAL BLOOD PRESSURE LESS THAN 130/80: ICD-10-CM

## 2018-08-16 DIAGNOSIS — M54.42 ACUTE MIDLINE LOW BACK PAIN WITH LEFT-SIDED SCIATICA: ICD-10-CM

## 2018-08-16 NOTE — TELEPHONE ENCOUNTER
From: Leatha Girard  Sent: 8/16/2018 7:06 AM CDT  Subject: Medication Renewal Request    Tomasz Gomez.  Jonathan Honeycutt would like a refill of the following medications:     predniSONE 20 MG Oral Tab [LUZ ZAMORA DO]     ALPRAZolam 0.5 MG Oral Tab [LUZ NICOLE L

## 2018-08-17 RX ORDER — ALPRAZOLAM 0.5 MG/1
0.5 TABLET ORAL 3 TIMES DAILY PRN
Qty: 60 TABLET | Refills: 2 | Status: SHIPPED | OUTPATIENT
Start: 2018-08-17 | End: 2018-10-03

## 2018-08-17 RX ORDER — ALPRAZOLAM 0.5 MG/1
0.5 TABLET ORAL 3 TIMES DAILY PRN
Qty: 60 TABLET | Refills: 0
Start: 2018-08-17

## 2018-08-17 RX ORDER — PREDNISONE 20 MG/1
TABLET ORAL
Qty: 20 TABLET | Refills: 0
Start: 2018-08-17

## 2018-08-17 RX ORDER — LEVOTHYROXINE SODIUM 0.07 MG/1
75 TABLET ORAL
Qty: 90 TABLET | Refills: 0 | Status: SHIPPED
Start: 2018-08-17 | End: 2018-10-17

## 2018-08-17 RX ORDER — LOSARTAN POTASSIUM 100 MG/1
100 TABLET ORAL DAILY
Qty: 90 TABLET | Refills: 0 | Status: SHIPPED
Start: 2018-08-17 | End: 2018-10-17

## 2018-08-17 NOTE — TELEPHONE ENCOUNTER
From: Peggy Colunga  Sent: 8/12/2018 2:26 PM CDT  Subject: Medication Renewal Request    Basia Johnny.  Ellen Reddy would like a refill of the following medications:     triamcinolone acetonide 0.1 % External Cream Jasmeet Mcallister MD]    Preferred pharmacy: [facility]

## 2018-08-17 NOTE — TELEPHONE ENCOUNTER
triamcinolone acetonide 0.1 % External Cream    Non protocol medication.     Rx is pending for your approval.    Please sign,    Thank you

## 2018-08-20 ENCOUNTER — OFFICE VISIT (OUTPATIENT)
Dept: FAMILY MEDICINE CLINIC | Facility: CLINIC | Age: 48
End: 2018-08-20
Payer: COMMERCIAL

## 2018-08-20 VITALS
SYSTOLIC BLOOD PRESSURE: 138 MMHG | TEMPERATURE: 98 F | WEIGHT: 253 LBS | DIASTOLIC BLOOD PRESSURE: 84 MMHG | RESPIRATION RATE: 14 BRPM | HEIGHT: 63 IN | BODY MASS INDEX: 44.83 KG/M2 | HEART RATE: 90 BPM

## 2018-08-20 DIAGNOSIS — E11.65 UNCONTROLLED TYPE 2 DIABETES MELLITUS WITH HYPERGLYCEMIA, WITHOUT LONG-TERM CURRENT USE OF INSULIN (HCC): Primary | ICD-10-CM

## 2018-08-20 DIAGNOSIS — F41.1 GENERALIZED ANXIETY DISORDER: ICD-10-CM

## 2018-08-20 DIAGNOSIS — G47.19 EXCESSIVE DAYTIME SLEEPINESS: ICD-10-CM

## 2018-08-20 DIAGNOSIS — I10 ESSENTIAL HYPERTENSION WITH GOAL BLOOD PRESSURE LESS THAN 130/80: ICD-10-CM

## 2018-08-20 DIAGNOSIS — J30.2 SEASONAL ALLERGIC RHINITIS, UNSPECIFIED TRIGGER: ICD-10-CM

## 2018-08-20 DIAGNOSIS — E03.9 ACQUIRED HYPOTHYROIDISM: ICD-10-CM

## 2018-08-20 PROCEDURE — 99215 OFFICE O/P EST HI 40 MIN: CPT | Performed by: FAMILY MEDICINE

## 2018-08-20 RX ORDER — FLUTICASONE PROPIONATE 50 MCG
2 SPRAY, SUSPENSION (ML) NASAL DAILY
Qty: 1 BOTTLE | Refills: 2 | Status: SHIPPED | OUTPATIENT
Start: 2018-08-20 | End: 2018-11-07

## 2018-08-24 ENCOUNTER — OFFICE VISIT (OUTPATIENT)
Dept: SURGERY | Facility: CLINIC | Age: 48
End: 2018-08-24
Payer: COMMERCIAL

## 2018-08-24 VITALS — SYSTOLIC BLOOD PRESSURE: 140 MMHG | HEART RATE: 82 BPM | DIASTOLIC BLOOD PRESSURE: 80 MMHG

## 2018-08-24 DIAGNOSIS — D35.2 PITUITARY ADENOMA (HCC): Primary | ICD-10-CM

## 2018-08-24 DIAGNOSIS — G95.9 CERVICAL MYELOPATHY WITH CERVICAL RADICULOPATHY (HCC): ICD-10-CM

## 2018-08-24 DIAGNOSIS — M47.816 LUMBAR SPONDYLOSIS: ICD-10-CM

## 2018-08-24 DIAGNOSIS — M54.12 CERVICAL MYELOPATHY WITH CERVICAL RADICULOPATHY (HCC): ICD-10-CM

## 2018-08-24 PROCEDURE — 99214 OFFICE O/P EST MOD 30 MIN: CPT | Performed by: PHYSICIAN ASSISTANT

## 2018-08-24 RX ORDER — HYDROCODONE BITARTRATE AND ACETAMINOPHEN 10; 325 MG/1; MG/1
1 TABLET ORAL EVERY 4 HOURS PRN
Qty: 90 TABLET | Refills: 0 | Status: SHIPPED | OUTPATIENT
Start: 2018-08-24 | End: 2018-10-03

## 2018-08-24 RX ORDER — ASPIRIN 81 MG/1
1 TABLET, COATED ORAL DAILY
Refills: 0 | COMMUNITY
Start: 2018-08-20 | End: 2018-08-24

## 2018-08-24 NOTE — PROGRESS NOTES
Pt is here for follow up for Pituitary tumor and back pain. Imaging to review of the pituitary. Pt states that she is still the same since the last time we seen her. Pt states that she hasn't started PT yet.  Pt states that she is currently doing exeresis a

## 2018-08-24 NOTE — PROGRESS NOTES
[facility] Neurosurgery follow up        HISTORY OF PRESENT ILLNESS:Bertha Arisa is a 46year old female here in follow-up. He continues with cervical pain and left arm radiculopathy. She continues with quite severe back pain and radiculopathy.     She did get radiculopathy down the legs.  Her back pain and spasms are worse with sitting and laying down.  They slightly improved with standing.  She does get back pain with transition from sitting to standing.  She does get with standing sharp shooting pain into the   Positive left Spurling's.  Bilateral Mercado's.   See her back pain with transitioning from sitting to standing.   Upper extremity strength:        Deltoid    Triceps     Biceps        Wrist Extension  Finger extension Thumb Abduction  Thumb adduction agreement  3.     Endocrine panel  4. Physical therapy for cervical and lumbar spine  5. Pain clinic for cervical and lumbar epidurals  6. ENT consult with Dr. Jenni Ryan  7. Endocrine workup       T.  ANNABEL Amor.S., PA-C  [address]

## 2018-08-24 NOTE — PATIENT INSTRUCTIONS
Refill policies:    • Allow 2-3 business days for refills; controlled substances may take longer.   • Contact your pharmacy at least 5 days prior to running out of medication and have them send an electronic request or submit request through the “request re entire amount billed. Precertification and Prior Authorizations: If your physician has recommended that you have a procedure or additional testing performed.   [facility]) will contact your insurance carrier to obtain pre-certi therapy for cervical and lumbar spine  5. Pain clinic for cervical and lumbar epidurals  6. ENT consult with Dr. Rod Camejo  7.   Endocrine workup

## 2018-10-03 ENCOUNTER — TELEPHONE (OUTPATIENT)
Dept: FAMILY MEDICINE CLINIC | Facility: CLINIC | Age: 48
End: 2018-10-03

## 2018-10-03 ENCOUNTER — OFFICE VISIT (OUTPATIENT)
Dept: FAMILY MEDICINE CLINIC | Facility: CLINIC | Age: 48
End: 2018-10-03
Payer: COMMERCIAL

## 2018-10-03 VITALS
HEART RATE: 98 BPM | SYSTOLIC BLOOD PRESSURE: 158 MMHG | TEMPERATURE: 98 F | WEIGHT: 254 LBS | BODY MASS INDEX: 45 KG/M2 | HEIGHT: 63 IN | RESPIRATION RATE: 18 BRPM | DIASTOLIC BLOOD PRESSURE: 90 MMHG

## 2018-10-03 DIAGNOSIS — M54.42 ACUTE MIDLINE LOW BACK PAIN WITH LEFT-SIDED SCIATICA: ICD-10-CM

## 2018-10-03 DIAGNOSIS — R19.7 DIARRHEA, UNSPECIFIED TYPE: Primary | ICD-10-CM

## 2018-10-03 DIAGNOSIS — R10.9 ABDOMINAL CRAMPS: ICD-10-CM

## 2018-10-03 DIAGNOSIS — E66.01 OBESITY, MORBID, BMI 40.0-49.9 (HCC): ICD-10-CM

## 2018-10-03 DIAGNOSIS — F41.1 GENERALIZED ANXIETY DISORDER: ICD-10-CM

## 2018-10-03 PROCEDURE — 99214 OFFICE O/P EST MOD 30 MIN: CPT | Performed by: FAMILY MEDICINE

## 2018-10-03 RX ORDER — FLUTICASONE PROPIONATE 50 MCG
2 SPRAY, SUSPENSION (ML) NASAL DAILY
Qty: 1 BOTTLE | Refills: 2 | Status: CANCELLED | OUTPATIENT
Start: 2018-10-03

## 2018-10-03 RX ORDER — IBUPROFEN 800 MG/1
800 TABLET ORAL 2 TIMES DAILY
Qty: 60 TABLET | Refills: 1 | Status: CANCELLED | OUTPATIENT
Start: 2018-10-03

## 2018-10-03 RX ORDER — ALPRAZOLAM 0.5 MG/1
0.5 TABLET ORAL 3 TIMES DAILY PRN
Qty: 60 TABLET | Refills: 0 | Status: CANCELLED | OUTPATIENT
Start: 2018-10-03

## 2018-10-03 NOTE — PROGRESS NOTES
Zondra Hammans is a 46year old female. cc diarrhea, no cramps/bloating, obesity  HPI:   Patient is coming to the office complaining of having abdominal cramps and diarrhea her symptoms started on Friday. Had a chicken asparagus and potatoes.   She had skin 3d, 1 tab PO daily x 3d, 1/2 tab PO daily x 3d Disp: 20 tablet Rfl: 0   ibuprofen 800 MG Oral Tab Take 800 mg by mouth 2 (two) times daily. Disp:  Rfl: 1   Metoprolol Succinate ER 25 MG Oral Tablet 24 Hr Take 25 mg by mouth daily.  Disp:  Rfl:    hydrochlor unspecified type  (primary encounter diagnosis)  Abdominal cramps  Obesity, morbid, bmi 40.0-49.9 (hcc)    No orders of the defined types were placed in this encounter.       Meds & Refills for this Visit:  Requested Prescriptions      No prescriptions requ

## 2018-10-03 NOTE — TELEPHONE ENCOUNTER
Medication: Norco  Mg     Date of last refill: 8/24/18  90 Tabs     [place]  9/7/18     Last office visit: 8/24/18     Due back to clinic per last office note:  NA     Date next office visit scheduled:  1

## 2018-10-03 NOTE — PATIENT INSTRUCTIONS
BRAT diet- banana, rice, apple sauce, toast.   Try probiotic daily like ALIGN over-the-counter. Peptobismol twice a day if continued loose stool and probiotic is not sufficient  Getorade, sprite - drink plenty of fluids. Avoid fatty foods.   Decrease car

## 2018-10-04 RX ORDER — HYDROCODONE BITARTRATE AND ACETAMINOPHEN 10; 325 MG/1; MG/1
1 TABLET ORAL EVERY 8 HOURS PRN
Qty: 90 TABLET | Refills: 0 | Status: SHIPPED | OUTPATIENT
Start: 2018-10-04 | End: 2018-11-25

## 2018-10-04 NOTE — TELEPHONE ENCOUNTER
This is patient of Dr. Summer Silva. I seen her for some diarrhea , abdominal discomfort,  bloating in the office. She is 46years old she did get a colonoscopy done. Please give her information for Dr. Araceli Goss office.

## 2018-10-04 NOTE — TELEPHONE ENCOUNTER
She did not was given a prescription in August she feels that around September 7. She appears to be taking about 3 a day. We will monitor her use.   Prescription was refilled will consider lowering her down to twice a day at the next prescription

## 2018-10-05 RX ORDER — ALPRAZOLAM 0.5 MG/1
0.5 TABLET ORAL 3 TIMES DAILY PRN
Qty: 60 TABLET | Refills: 0 | Status: SHIPPED | OUTPATIENT
Start: 2018-10-05 | End: 2018-10-17

## 2018-10-05 RX ORDER — PREDNISONE 20 MG/1
TABLET ORAL
Qty: 20 TABLET | Refills: 0 | OUTPATIENT
Start: 2018-10-05

## 2018-10-12 ENCOUNTER — OFFICE VISIT (OUTPATIENT)
Dept: SURGERY | Facility: CLINIC | Age: 48
End: 2018-10-12
Payer: COMMERCIAL

## 2018-10-12 VITALS — SYSTOLIC BLOOD PRESSURE: 148 MMHG | HEART RATE: 68 BPM | DIASTOLIC BLOOD PRESSURE: 86 MMHG | RESPIRATION RATE: 16 BRPM

## 2018-10-12 DIAGNOSIS — G95.9 CERVICAL MYELOPATHY WITH CERVICAL RADICULOPATHY (HCC): ICD-10-CM

## 2018-10-12 DIAGNOSIS — R29.898 WEAKNESS OF BOTH LOWER EXTREMITIES: ICD-10-CM

## 2018-10-12 DIAGNOSIS — M54.12 CERVICAL MYELOPATHY WITH CERVICAL RADICULOPATHY (HCC): ICD-10-CM

## 2018-10-12 DIAGNOSIS — M50.00 CERVICAL DISC DISEASE WITH MYELOPATHY: ICD-10-CM

## 2018-10-12 DIAGNOSIS — D35.2 PITUITARY ADENOMA (HCC): Primary | ICD-10-CM

## 2018-10-12 DIAGNOSIS — M47.816 LUMBAR SPONDYLOSIS: ICD-10-CM

## 2018-10-12 DIAGNOSIS — R29.898 WEAKNESS OF BOTH UPPER EXTREMITIES: ICD-10-CM

## 2018-10-12 PROCEDURE — 99213 OFFICE O/P EST LOW 20 MIN: CPT | Performed by: PHYSICIAN ASSISTANT

## 2018-10-12 RX ORDER — METOPROLOL SUCCINATE 50 MG/1
1 TABLET, EXTENDED RELEASE ORAL DAILY
Refills: 5 | COMMUNITY
Start: 2018-05-10 | End: 2019-04-19

## 2018-10-12 NOTE — PROGRESS NOTES
[facility] Neurosurgery follow up        HISTORY OF PRESENT ILLNESS:Bertha Arias is a 46year old female here in follow-up.   Her last visit she has not had her endocrine workup, she has not started physical therapy, she is not seen the pain service for injection pain.     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 Tom Noguera has a history of having neck problems on and off for a number of months.  Most recently she has had severe back spasms that started several days ago. Tom Noguera complains of cervical pain which is a 6/10.  Complains 10-point system was reviewed.  Pertinent positives and negatives are noted in HPI.       PHYSICAL EXAMINATION:  GENERAL:  Patient is in no acute distress.   HEENT:  Normocephalic, atraumatic  SKIN: Warm, dry, no rashes.     NEUROLOGICAL:  This patient is al L4-5 L5-S1        ASSESSMENT:  1.  C3-4, 5-6 cervical stenosis with myelopathy and left C6 radiculopathy  2.  L2-3 severe spondylosis with mechanical back pain and L2 radiculopathy  3.   Pituitary adenoma     PLAN:  1.    Norco refilled and due for pickup t

## 2018-10-12 NOTE — PATIENT INSTRUCTIONS
Refill policies:    • Allow 2-3 business days for refills; controlled substances may take longer.   • Contact your pharmacy at least 5 days prior to running out of medication and have them send an electronic request or submit request through the “request re entire amount billed. Precertification and Prior Authorizations: If your physician has recommended that you have a procedure or additional testing performed.   [facility]) will contact your insurance carrier to obtain pre-certi     Endocrine panel today  4. Physical therapy for cervical and lumbar spine  5. Pain clinic for cervical and lumbar epidurals  6. ENT consult with Dr. Adrianna Lui  7. Continue with soft cervical collar, she can try home cervical traction  8.   Follow-up in

## 2018-10-13 ENCOUNTER — APPOINTMENT (OUTPATIENT)
Dept: GENERAL RADIOLOGY | Facility: HOSPITAL | Age: 48
End: 2018-10-13
Attending: EMERGENCY MEDICINE
Payer: COMMERCIAL

## 2018-10-13 ENCOUNTER — HOSPITAL ENCOUNTER (EMERGENCY)
Facility: HOSPITAL | Age: 48
Discharge: HOME | End: 2018-10-13
Attending: EMERGENCY MEDICINE
Payer: COMMERCIAL

## 2018-10-13 VITALS
SYSTOLIC BLOOD PRESSURE: 116 MMHG | BODY MASS INDEX: 38.57 KG/M2 | DIASTOLIC BLOOD PRESSURE: 78 MMHG | WEIGHT: 240 LBS | TEMPERATURE: 98 F | OXYGEN SATURATION: 100 % | HEART RATE: 88 BPM | HEIGHT: 66 IN | RESPIRATION RATE: 18 BRPM

## 2018-10-13 DIAGNOSIS — I47.1 SVT (SUPRAVENTRICULAR TACHYCARDIA) (HCC): Primary | ICD-10-CM

## 2018-10-13 DIAGNOSIS — R77.8 ELEVATED TROPONIN I LEVEL: ICD-10-CM

## 2018-10-13 PROCEDURE — 93005 ELECTROCARDIOGRAM TRACING: CPT

## 2018-10-13 PROCEDURE — 85025 COMPLETE CBC W/AUTO DIFF WBC: CPT | Performed by: EMERGENCY MEDICINE

## 2018-10-13 PROCEDURE — 80061 LIPID PANEL: CPT | Performed by: EMERGENCY MEDICINE

## 2018-10-13 PROCEDURE — 99285 EMERGENCY DEPT VISIT HI MDM: CPT

## 2018-10-13 PROCEDURE — 84439 ASSAY OF FREE THYROXINE: CPT | Performed by: EMERGENCY MEDICINE

## 2018-10-13 PROCEDURE — 84443 ASSAY THYROID STIM HORMONE: CPT | Performed by: EMERGENCY MEDICINE

## 2018-10-13 PROCEDURE — 85730 THROMBOPLASTIN TIME PARTIAL: CPT | Performed by: EMERGENCY MEDICINE

## 2018-10-13 PROCEDURE — 96361 HYDRATE IV INFUSION ADD-ON: CPT

## 2018-10-13 PROCEDURE — 93010 ELECTROCARDIOGRAM REPORT: CPT

## 2018-10-13 PROCEDURE — 80053 COMPREHEN METABOLIC PANEL: CPT | Performed by: EMERGENCY MEDICINE

## 2018-10-13 PROCEDURE — 85610 PROTHROMBIN TIME: CPT | Performed by: EMERGENCY MEDICINE

## 2018-10-13 PROCEDURE — 96374 THER/PROPH/DIAG INJ IV PUSH: CPT

## 2018-10-13 PROCEDURE — 84484 ASSAY OF TROPONIN QUANT: CPT | Performed by: EMERGENCY MEDICINE

## 2018-10-13 PROCEDURE — 71045 X-RAY EXAM CHEST 1 VIEW: CPT | Performed by: EMERGENCY MEDICINE

## 2018-10-13 RX ORDER — ADENOSINE 3 MG/ML
12 INJECTION, SOLUTION INTRAVENOUS ONCE
Status: COMPLETED | OUTPATIENT
Start: 2018-10-13 | End: 2018-10-13

## 2018-10-13 RX ORDER — ADENOSINE 3 MG/ML
6 INJECTION, SOLUTION INTRAVENOUS ONCE
Status: COMPLETED | OUTPATIENT
Start: 2018-10-13 | End: 2018-10-13

## 2018-10-13 RX ORDER — SODIUM CHLORIDE 9 MG/ML
INJECTION, SOLUTION INTRAVENOUS CONTINUOUS
Status: DISCONTINUED | OUTPATIENT
Start: 2018-10-13 | End: 2018-10-13

## 2018-10-13 NOTE — ED NOTES
Patient educated on discharge instructions. IV removed intact. Close follow up with her cardiologist and primary encouraged. Encouraged to take all medications as prescribed. Patient ambulatory with a steady gait for discharge.

## 2018-10-13 NOTE — ED PROVIDER NOTES
Patient Seen in: [facility] Emergency Department    History   Patient presents with:  Rapid Heart Beat    Stated Complaint: rapid hb    HPI    Patient is a pleasant 23-year-old female, with history of SVT, presenting for evaluation of dyspnea, chest Ht 167.6 cm (5' 6\")   Wt 108.9 kg   SpO2 100%   BMI 38.74 kg/m²       Physical Exam    Vital signs noted. GENERAL: Patient is awake and alert, supine on the cart, appearing generally unwell. HEENT: Head is without evidence of trauma.  Extraocular muscl WBC 13.9 (*)     RBC 5.60 (*)     Neutrophil Absolute Prelim 9.69 (*)     Neutrophil Absolute 9.69 (*)     Monocyte Absolute 1.16 (*)     All other components within normal limits   PROTHROMBIN TIME (PT) - Normal   PTT, ACTIVATED - Normal   CBC WITH DIFFER placed on a cart, an IV was established, and blood was drawn and sent to the laboratory for further evaluation. The patient was attached to a cardiac monitor. An EKG was obtained and reviewed as noted above.      Adenosine 6 mg was administered without conv in 2 days          Medications Prescribed:  Current Discharge Medication List

## 2018-10-13 NOTE — ED NOTES
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

## 2018-10-15 ENCOUNTER — TELEPHONE (OUTPATIENT)
Dept: FAMILY MEDICINE CLINIC | Facility: CLINIC | Age: 48
End: 2018-10-15

## 2018-10-15 NOTE — TELEPHONE ENCOUNTER
Pt states she does not have a  today to take her to the appt, not sleeping well but feels ok. Will be seen on Wed.

## 2018-10-15 NOTE — TELEPHONE ENCOUNTER
Pt was in the hospital on the 13th and today was supposed to have an appt but felt to weak to come. Per Pelon Karen, wanted this to be sent to triage. Transferred live to triage.

## 2018-10-15 NOTE — TELEPHONE ENCOUNTER
Noted below--Pt seen in hospital for chest pain/SVT-- no further chest pain? Shortness of breath? Dizziness? lightheadedness? PT GOT TATTOO ON RIGHT UPPER ARM ON Friday. SINCE THEN, PT DEVELOPED SWELLING, PUSTULES, AND YELLOW DRAINAGE. PT SEEN AT [facility], [place], operated by [facility]. PT WAS GIVEN 1 GRAM IM ROCEPHIN AND PRESCRIBED KEFLEX AND SULFA-TRIMETHOPRIM. PT WAS INSTRUCTED TO GO TO ER IF SYMPTOMS WORSENED. PT REPORTS INCREASE SWELLING AND DRAINAGE SINCE YESTERDAY.

## 2018-10-17 ENCOUNTER — OFFICE VISIT (OUTPATIENT)
Dept: FAMILY MEDICINE CLINIC | Facility: CLINIC | Age: 48
End: 2018-10-17
Payer: COMMERCIAL

## 2018-10-17 VITALS
DIASTOLIC BLOOD PRESSURE: 80 MMHG | TEMPERATURE: 97 F | HEART RATE: 92 BPM | BODY MASS INDEX: 44.47 KG/M2 | HEIGHT: 63 IN | RESPIRATION RATE: 14 BRPM | SYSTOLIC BLOOD PRESSURE: 130 MMHG | WEIGHT: 251 LBS

## 2018-10-17 DIAGNOSIS — I47.1 SVT (SUPRAVENTRICULAR TACHYCARDIA) (HCC): Primary | ICD-10-CM

## 2018-10-17 DIAGNOSIS — E03.9 ACQUIRED HYPOTHYROIDISM: ICD-10-CM

## 2018-10-17 DIAGNOSIS — F41.1 GENERALIZED ANXIETY DISORDER: ICD-10-CM

## 2018-10-17 DIAGNOSIS — I10 ESSENTIAL HYPERTENSION WITH GOAL BLOOD PRESSURE LESS THAN 130/80: ICD-10-CM

## 2018-10-17 PROCEDURE — 99214 OFFICE O/P EST MOD 30 MIN: CPT | Performed by: FAMILY MEDICINE

## 2018-10-17 PROCEDURE — 1111F DSCHRG MED/CURRENT MED MERGE: CPT | Performed by: FAMILY MEDICINE

## 2018-10-17 RX ORDER — LOSARTAN POTASSIUM 100 MG/1
100 TABLET ORAL DAILY
Qty: 90 TABLET | Refills: 1 | Status: SHIPPED | OUTPATIENT
Start: 2018-10-17 | End: 2019-01-08

## 2018-10-17 RX ORDER — ATORVASTATIN CALCIUM 20 MG/1
20 TABLET, FILM COATED ORAL NIGHTLY
Qty: 90 TABLET | Refills: 1 | Status: SHIPPED | OUTPATIENT
Start: 2018-10-17 | End: 2019-01-08

## 2018-10-17 RX ORDER — LEVOTHYROXINE SODIUM 0.07 MG/1
75 TABLET ORAL
Qty: 90 TABLET | Refills: 0 | Status: SHIPPED | OUTPATIENT
Start: 2018-10-17 | End: 2019-01-08

## 2018-10-17 RX ORDER — ALPRAZOLAM 0.5 MG/1
0.5 TABLET ORAL 3 TIMES DAILY PRN
Qty: 60 TABLET | Refills: 2 | Status: SHIPPED | OUTPATIENT
Start: 2018-10-17 | End: 2019-01-08

## 2018-10-17 NOTE — PROGRESS NOTES
[facility] Family Medicine Office Note  Chief Complaint:   Patient presents with:  Hospital F/U: seen 10/13/18 [facility] ER elevated heartrate      HPI:   This is a 46year old female with a past medical history of SVT, hypertension, hyperlipidemia and No    Family History:  Family History   Problem Relation Age of Onset   • Hypertension Father    • Diabetes Father    • Hypertension Mother    • Diabetes Mother    • Cancer Mother      Allergies:    Eggs Or Egg-Derived*    Dyspnea  Peanuts [Peanut Oil] OF SYSTEMS:   ROS:  CONSTITUTIONAL:  Denies any unusual weight gain/loss, fever, chills, weakness or fatigue. HEENT:  Eyes:  Denies visual loss, blurred vision, double vision or yellow sclerae.  Ears, Nose, Throat:  Denies hearing loss, sneezing, congestio mg total) by mouth daily. Dispense: 90 tablet; Refill: 1  - atorvastatin 20 MG Oral Tab; Take 1 tablet (20 mg total) by mouth nightly. Dispense: 90 tablet; Refill: 1    3.  Generalized anxiety disorder  -  Stable  -  Continue xanax PRN  - ALPRAZolam 0.5 M effects or complications from the treatments as a result of today.      Problem List:  Patient Active Problem List:     Biliary colic     Intractable abdominal pain     Essential hypertension with goal blood pressure less than 130/80     Diabetes mellitus t

## 2018-10-25 ENCOUNTER — OFFICE VISIT (OUTPATIENT)
Dept: PAIN CLINIC | Facility: CLINIC | Age: 48
End: 2018-10-25
Payer: COMMERCIAL

## 2018-10-25 ENCOUNTER — TELEPHONE (OUTPATIENT)
Dept: PAIN CLINIC | Facility: CLINIC | Age: 48
End: 2018-10-25

## 2018-10-25 VITALS
OXYGEN SATURATION: 98 % | SYSTOLIC BLOOD PRESSURE: 142 MMHG | WEIGHT: 251 LBS | BODY MASS INDEX: 44.47 KG/M2 | HEIGHT: 63 IN | DIASTOLIC BLOOD PRESSURE: 86 MMHG | HEART RATE: 89 BPM

## 2018-10-25 DIAGNOSIS — M54.12 CERVICAL RADICULOPATHY: Primary | ICD-10-CM

## 2018-10-25 DIAGNOSIS — M47.26 OSTEOARTHRITIS OF SPINE WITH RADICULOPATHY, LUMBAR REGION: ICD-10-CM

## 2018-10-25 PROCEDURE — 99203 OFFICE O/P NEW LOW 30 MIN: CPT | Performed by: ANESTHESIOLOGY

## 2018-10-25 NOTE — PATIENT INSTRUCTIONS
Refill policies:    • Allow 2-3 business days for refills; controlled substances may take longer.   • Contact your pharmacy at least 5 days prior to running out of medication and have them send an electronic request or submit request through the “request re entire amount billed. Precertification and Prior Authorizations: If your physician has recommended that you have a procedure or additional testing performed.   [facility]) will contact your insurance carrier to obtain pre-certi such as cough, fever, chills, urinary symptoms, or have recently been prescribed antibiotics, have open wounds, have recently had surgery or dental procedures.     As you will be unable to shower for 24 hours after your procedure, we ask that you bathe with Fish oil, krill oil, vitamin E              Insurance Authorization:   Most insurances are now requiring a preauthorization for all procedures.   In the event that your insurance does not authorize your procedure within 48 hours of the scheduled da

## 2018-10-25 NOTE — PROGRESS NOTES
PHYSICAL EXAM:   Physical Exam   Constitutional:              PZ#[id number]    Ref by Yolie Given c/o bilateral neck pain (more on L) 6/10, bilateral low back 8/10. Last dose of Norco 0800 today.  Reports that she is taking Norco q8 hrs but could take it q4-5

## 2018-10-25 NOTE — TELEPHONE ENCOUNTER
Medical clearance needed- no    Pt seen in OV today by Dr. Saturnino Flores and recommended for facet and ALECIA (X 2 total). Please begin PA process for procedure(s).      Laterality: bilateral facet  Level(s): L3-5    Laterality: ALECIA  Level(s): n/a    Pt informed lottie

## 2018-10-25 NOTE — H&P
Name: Jamey Colon   : 1/3/1970   DOS: 10/25/2018     Chief complaint: Cervical radiculopathy and low back pain    History of present illness:  Jamey Colon is a 46year old female referred by neurosurgery for evaluation of chronic cervical radiculopa with meals. Disp: 180 tablet Rfl: 0   HYDROcodone-acetaminophen  MG Oral Tab Take 1 tablet by mouth every 8 (eight) hours as needed for Pain. Disp: 90 tablet Rfl: 0   aspirin 81 MG Oral Tab Take 1 tablet (81 mg total) by mouth daily.  Disp: 90 tablet 5  Triceps:      5    5  Wrist Extension:     5    5  Wrist Flexsion:                 5    5  Finger Extension:     5    5  Finger Flexsion:      5    5    Neurologic:  Cranial nerves II through XII are grossly intact.        Examination of the lower back:

## 2018-10-29 NOTE — TELEPHONE ENCOUNTER
Spoke to Ashutosh pérez at . [address], codes 9[address]nie [address] (ENONY) 10614 (ALECIA) are valid and billable, no prior authorization or predetermination required.    Call reference: [id number]  Call time 46:47    Patient can be scheduled out

## 2018-10-31 NOTE — TELEPHONE ENCOUNTER
Spoke to patient, notified of approval, agreeable to scheduling. procedures scheduled, pre-procedure instructions reviewed, patient verbalized understanding, no further needs.       [address]  PRE-PROCEDURE INSTRUCTIONS WITHOUT SEDATION ? Xarelto (Rivaroxaban) 3 days  ? Lovenox (Enoxaparin) 24 hours  ? Aspirin  ? 81mg 24 hours  ? Greater than 81 mg (325mg) 7 days  ? Coumadin       5 days   · Procedure may be cancelled if INR is elevated. ? Excedrin (with aspirin) 7 days  ?  Plavix (Clopid ** TO AVOID CANCELLATION AND/OR RESCHEDULING: PLEASE CALL [facility] PRE-PROCEDURE LINE [phone] FOR DETAILED INSTRUCTIONS FIVE TO SEVEN DAYS PRIOR TO PROCEDURE**

## 2018-11-02 ENCOUNTER — TELEPHONE (OUTPATIENT)
Dept: SURGERY | Facility: CLINIC | Age: 48
End: 2018-11-02

## 2018-11-02 NOTE — TELEPHONE ENCOUNTER
Left message for patient to call back to confirm procedure date of 11/7/18 with Dr Kisha Horvath and to be checked in at outpatient registration at 7:00 am. Patient to be instructed to call pre-procedure line before procedure at [phone].  Patient to be instruct

## 2018-11-07 ENCOUNTER — APPOINTMENT (OUTPATIENT)
Dept: GENERAL RADIOLOGY | Facility: HOSPITAL | Age: 48
End: 2018-11-07
Attending: ANESTHESIOLOGY
Payer: COMMERCIAL

## 2018-11-07 ENCOUNTER — HOSPITAL ENCOUNTER (OUTPATIENT)
Facility: HOSPITAL | Age: 48
Setting detail: HOSPITAL OUTPATIENT SURGERY
Discharge: HOME | End: 2018-11-07
Attending: ANESTHESIOLOGY | Admitting: ANESTHESIOLOGY
Payer: COMMERCIAL

## 2018-11-07 VITALS
SYSTOLIC BLOOD PRESSURE: 145 MMHG | TEMPERATURE: 98 F | RESPIRATION RATE: 20 BRPM | DIASTOLIC BLOOD PRESSURE: 74 MMHG | HEART RATE: 62 BPM | OXYGEN SATURATION: 97 %

## 2018-11-07 DIAGNOSIS — E11.65 UNCONTROLLED TYPE 2 DIABETES MELLITUS WITH HYPERGLYCEMIA, WITHOUT LONG-TERM CURRENT USE OF INSULIN (HCC): ICD-10-CM

## 2018-11-07 DIAGNOSIS — M47.26 OSTEOARTHRITIS OF SPINE WITH RADICULOPATHY, LUMBAR REGION: ICD-10-CM

## 2018-11-07 DIAGNOSIS — J30.2 SEASONAL ALLERGIC RHINITIS, UNSPECIFIED TRIGGER: ICD-10-CM

## 2018-11-07 PROCEDURE — 82962 GLUCOSE BLOOD TEST: CPT

## 2018-11-07 PROCEDURE — BR161ZZ FLUOROSCOPY OF LUMBAR FACET JOINT(S) USING LOW OSMOLAR CONTRAST: ICD-10-PCS | Performed by: ANESTHESIOLOGY

## 2018-11-07 PROCEDURE — 3E0U33Z INTRODUCTION OF ANTI-INFLAMMATORY INTO JOINTS, PERCUTANEOUS APPROACH: ICD-10-PCS | Performed by: ANESTHESIOLOGY

## 2018-11-07 PROCEDURE — 81025 URINE PREGNANCY TEST: CPT | Performed by: ANESTHESIOLOGY

## 2018-11-07 PROCEDURE — 3E0U3BZ INTRODUCTION OF ANESTHETIC AGENT INTO JOINTS, PERCUTANEOUS APPROACH: ICD-10-PCS | Performed by: ANESTHESIOLOGY

## 2018-11-07 RX ORDER — DEXTROSE MONOHYDRATE 25 G/50ML
50 INJECTION, SOLUTION INTRAVENOUS
Status: DISCONTINUED | OUTPATIENT
Start: 2018-11-07 | End: 2018-11-07 | Stop reason: HOSPADM

## 2018-11-07 RX ORDER — METHYLPREDNISOLONE ACETATE 40 MG/ML
INJECTION, SUSPENSION INTRA-ARTICULAR; INTRALESIONAL; INTRAMUSCULAR; SOFT TISSUE AS NEEDED
Status: DISCONTINUED | OUTPATIENT
Start: 2018-11-07 | End: 2018-11-07 | Stop reason: HOSPADM

## 2018-11-07 RX ORDER — ONDANSETRON 2 MG/ML
4 INJECTION INTRAMUSCULAR; INTRAVENOUS ONCE AS NEEDED
Status: DISCONTINUED | OUTPATIENT
Start: 2018-11-07 | End: 2018-11-07 | Stop reason: HOSPADM

## 2018-11-07 RX ORDER — DIPHENHYDRAMINE HYDROCHLORIDE 50 MG/ML
50 INJECTION INTRAMUSCULAR; INTRAVENOUS ONCE AS NEEDED
Status: DISCONTINUED | OUTPATIENT
Start: 2018-11-07 | End: 2018-11-07

## 2018-11-07 RX ORDER — INSULIN ASPART 100 [IU]/ML
3 INJECTION, SOLUTION INTRAVENOUS; SUBCUTANEOUS ONCE
Status: DISCONTINUED | OUTPATIENT
Start: 2018-11-07 | End: 2018-11-07 | Stop reason: HOSPADM

## 2018-11-07 RX ORDER — SODIUM CHLORIDE, SODIUM LACTATE, POTASSIUM CHLORIDE, CALCIUM CHLORIDE 600; 310; 30; 20 MG/100ML; MG/100ML; MG/100ML; MG/100ML
100 INJECTION, SOLUTION INTRAVENOUS CONTINUOUS
Status: DISCONTINUED | OUTPATIENT
Start: 2018-11-07 | End: 2018-11-07

## 2018-11-07 RX ORDER — BUPIVACAINE HYDROCHLORIDE 5 MG/ML
INJECTION, SOLUTION EPIDURAL; INTRACAUDAL AS NEEDED
Status: DISCONTINUED | OUTPATIENT
Start: 2018-11-07 | End: 2018-11-07 | Stop reason: HOSPADM

## 2018-11-07 RX ORDER — LIDOCAINE HYDROCHLORIDE 10 MG/ML
INJECTION, SOLUTION EPIDURAL; INFILTRATION; INTRACAUDAL; PERINEURAL AS NEEDED
Status: DISCONTINUED | OUTPATIENT
Start: 2018-11-07 | End: 2018-11-07 | Stop reason: HOSPADM

## 2018-11-07 RX ORDER — ONDANSETRON 2 MG/ML
4 INJECTION INTRAMUSCULAR; INTRAVENOUS ONCE AS NEEDED
Status: DISCONTINUED | OUTPATIENT
Start: 2018-11-07 | End: 2018-11-07

## 2018-11-07 RX ORDER — HYDROCODONE BITARTRATE AND ACETAMINOPHEN 10; 325 MG/1; MG/1
1 TABLET ORAL EVERY 8 HOURS PRN
Refills: 0 | OUTPATIENT
Start: 2018-11-07

## 2018-11-07 NOTE — H&P
History & Physical Examination    Patient Name: Kuldeep Wilks  MRN: [id number]  CSN: [id number]  YOB: 1970    Pre-Operative Diagnosis:  Osteoarthritis of spine with radiculopathy, lumbar region [M47.26]    Present Illness: Patient with lumbar s Min PRN   Or      Glucose-Vitamin C (DEX-4) 4-6 GM-MG chewable tab 4 tablet 4 tablet Oral Q15 Min PRN   Or      dextrose 50 % injection 50 mL 50 mL Intravenous Q15 Min PRN   Or      glucose (DEX4) oral liquid 30 g 30 g Oral Q15 Min PRN   Or      Glucose-Vi above.     Drake Pichardo MD

## 2018-11-07 NOTE — OPERATIVE REPORT
[facility]  Operative Report  2018     Emely Glaser Patient Status:  Hospital Outpatient Surgery    1/3/1970 MRN [id number]   [facility] SURGERY Attending Rosaura Kong MD   Hosp Day # 0 PCP LUZ ZAMORA DO     Indication: criteria met. Discharge instructions were given and patient was released to a responsible adult. Complications: None. Follow up: The patient was followed in the pain clinic as needed basis.       David Varghese MD

## 2018-11-08 RX ORDER — FLUTICASONE PROPIONATE 50 MCG
2 SPRAY, SUSPENSION (ML) NASAL DAILY
Qty: 1 BOTTLE | Refills: 2 | Status: SHIPPED | OUTPATIENT
Start: 2018-11-08 | End: 2019-01-08

## 2018-11-09 ENCOUNTER — TELEPHONE (OUTPATIENT)
Dept: SURGERY | Facility: CLINIC | Age: 48
End: 2018-11-09

## 2018-11-09 NOTE — TELEPHONE ENCOUNTER
Spoke to patient, confirmed procedure date of 11/14 and to be checked in at outpatient registration at 1:30 pm. Patient instructed to call pre-procedure line before procedure at [phone].  Patient instructed to call office if there are additional questi

## 2018-11-13 ENCOUNTER — TELEPHONE (OUTPATIENT)
Dept: SURGERY | Facility: CLINIC | Age: 48
End: 2018-11-13

## 2018-11-13 NOTE — TELEPHONE ENCOUNTER
Patient states she has a cold and need to reschedule procedure. Patient then states that she is not currently having any neck pain.  Advised patient will remove her from schedule for now, if her pain returns or she wishes to proceed with the injection, robbie

## 2018-11-26 RX ORDER — HYDROCODONE BITARTRATE AND ACETAMINOPHEN 10; 325 MG/1; MG/1
1 TABLET ORAL EVERY 8 HOURS PRN
Qty: 90 TABLET | Refills: 0 | Status: SHIPPED | OUTPATIENT
Start: 2018-11-26 | End: 2019-01-08

## 2018-11-26 RX ORDER — CYCLOBENZAPRINE HCL 10 MG
10 TABLET ORAL 3 TIMES DAILY PRN
Qty: 60 TABLET | Refills: 2 | Status: SHIPPED | OUTPATIENT
Start: 2018-11-26 | End: 2019-04-25

## 2018-11-26 NOTE — TELEPHONE ENCOUNTER
Medication: Norco     Cyclobenzaprine 10 mg    Date of last refill:  Norco  #90 10/04/2018   Cyclobenzaprine 10 mg #60(x3) 4/16/2018  [place] 11/52/8366    Last office visit: 10/12/208  Due back to clinic per

## 2018-11-26 NOTE — TELEPHONE ENCOUNTER
Rx sig states: Take 1 tablet (10 mg total) by mouth 3 (three) times daily as needed for Muscle spasms.  At Bedtime    Need to clarify with provider and call pharmacy back

## 2019-01-08 DIAGNOSIS — F41.1 GENERALIZED ANXIETY DISORDER: ICD-10-CM

## 2019-01-08 DIAGNOSIS — E11.65 UNCONTROLLED TYPE 2 DIABETES MELLITUS WITH HYPERGLYCEMIA, WITHOUT LONG-TERM CURRENT USE OF INSULIN (HCC): ICD-10-CM

## 2019-01-08 DIAGNOSIS — J30.2 SEASONAL ALLERGIC RHINITIS, UNSPECIFIED TRIGGER: ICD-10-CM

## 2019-01-08 DIAGNOSIS — E03.9 ACQUIRED HYPOTHYROIDISM: ICD-10-CM

## 2019-01-08 DIAGNOSIS — I10 ESSENTIAL HYPERTENSION WITH GOAL BLOOD PRESSURE LESS THAN 130/80: ICD-10-CM

## 2019-01-09 RX ORDER — ATORVASTATIN CALCIUM 20 MG/1
20 TABLET, FILM COATED ORAL NIGHTLY
Qty: 90 TABLET | Refills: 1 | Status: SHIPPED | OUTPATIENT
Start: 2019-01-09 | End: 2019-04-19

## 2019-01-09 RX ORDER — ALPRAZOLAM 0.5 MG/1
0.5 TABLET ORAL 3 TIMES DAILY PRN
Qty: 60 TABLET | Refills: 2 | Status: SHIPPED | OUTPATIENT
Start: 2019-01-09 | End: 2019-04-25

## 2019-01-09 RX ORDER — LEVOTHYROXINE SODIUM 0.07 MG/1
75 TABLET ORAL
Qty: 30 TABLET | Refills: 0 | Status: SHIPPED | OUTPATIENT
Start: 2019-01-09 | End: 2019-03-05 | Stop reason: DRUGHIGH

## 2019-01-09 RX ORDER — LOSARTAN POTASSIUM 100 MG/1
100 TABLET ORAL DAILY
Qty: 90 TABLET | Refills: 1 | Status: SHIPPED | OUTPATIENT
Start: 2019-01-09 | End: 2019-03-06 | Stop reason: DRUGHIGH

## 2019-01-09 RX ORDER — FLUTICASONE PROPIONATE 50 MCG
2 SPRAY, SUSPENSION (ML) NASAL DAILY
Qty: 1 BOTTLE | Refills: 2 | Status: SHIPPED | OUTPATIENT
Start: 2019-01-09 | End: 2019-04-25

## 2019-01-09 NOTE — TELEPHONE ENCOUNTER
Medication: Norco  Mg     Date of last refill: 11/26/18  90 Tabs     [place] Dispensed 61/90/53  90 Tabs     Last office visit: 10/12/18     Due back to clinic per last office note: 2 Months     Date next office vi

## 2019-01-10 RX ORDER — HYDROCODONE BITARTRATE AND ACETAMINOPHEN 10; 325 MG/1; MG/1
1 TABLET ORAL EVERY 8 HOURS PRN
Qty: 60 TABLET | Refills: 0 | Status: SHIPPED | OUTPATIENT
Start: 2019-01-10 | End: 2019-03-04

## 2019-01-22 ENCOUNTER — HOSPITAL ENCOUNTER (EMERGENCY)
Facility: HOSPITAL | Age: 49
Discharge: HOME | End: 2019-01-22
Attending: EMERGENCY MEDICINE
Payer: COMMERCIAL

## 2019-01-22 VITALS
RESPIRATION RATE: 16 BRPM | DIASTOLIC BLOOD PRESSURE: 72 MMHG | TEMPERATURE: 98 F | WEIGHT: 250 LBS | SYSTOLIC BLOOD PRESSURE: 122 MMHG | HEART RATE: 100 BPM | HEIGHT: 65 IN | OXYGEN SATURATION: 99 % | BODY MASS INDEX: 41.65 KG/M2

## 2019-01-22 DIAGNOSIS — I47.1 SVT (SUPRAVENTRICULAR TACHYCARDIA) (HCC): Primary | ICD-10-CM

## 2019-01-22 LAB
ALBUMIN SERPL-MCNC: 3.9 G/DL (ref 3.1–4.5)
ALBUMIN/GLOB SERPL: 0.8 {RATIO} (ref 1–2)
ALP LIVER SERPL-CCNC: 90 U/L (ref 41–108)
ALT SERPL-CCNC: 22 U/L (ref 14–54)
ANION GAP SERPL CALC-SCNC: 10 MMOL/L (ref 0–18)
AST SERPL-CCNC: 16 U/L (ref 15–41)
ATRIAL RATE: 115 BPM
ATRIAL RATE: 258 BPM
BASOPHILS # BLD AUTO: 0.03 X10(3) UL (ref 0–0.1)
BASOPHILS NFR BLD AUTO: 0.3 %
BILIRUB SERPL-MCNC: 0.2 MG/DL (ref 0.1–2)
BUN BLD-MCNC: 14 MG/DL (ref 8–20)
BUN/CREAT SERPL: 13.6 (ref 10–20)
CALCIUM BLD-MCNC: 9.3 MG/DL (ref 8.3–10.3)
CHLORIDE SERPL-SCNC: 106 MMOL/L (ref 101–111)
CO2 SERPL-SCNC: 23 MMOL/L (ref 22–32)
CREAT BLD-MCNC: 1.03 MG/DL (ref 0.55–1.02)
EOSINOPHIL # BLD AUTO: 0.18 X10(3) UL (ref 0–0.3)
EOSINOPHIL NFR BLD AUTO: 1.7 %
ERYTHROCYTE [DISTWIDTH] IN BLOOD BY AUTOMATED COUNT: 13.9 % (ref 11.5–16)
GLOBULIN PLAS-MCNC: 4.6 G/DL (ref 2.8–4.4)
GLUCOSE BLD-MCNC: 168 MG/DL (ref 70–99)
HCT VFR BLD AUTO: 44.2 % (ref 34–50)
HGB BLD-MCNC: 14.7 G/DL (ref 12–16)
IMMATURE GRANULOCYTE COUNT: 0.02 X10(3) UL (ref 0–1)
IMMATURE GRANULOCYTE RATIO %: 0.2 %
LYMPHOCYTES # BLD AUTO: 2.42 X10(3) UL (ref 0.9–4)
LYMPHOCYTES NFR BLD AUTO: 23 %
M PROTEIN MFR SERPL ELPH: 8.5 G/DL (ref 6.4–8.2)
MCH RBC QN AUTO: 27.8 PG (ref 27–33.2)
MCHC RBC AUTO-ENTMCNC: 33.3 G/DL (ref 31–37)
MCV RBC AUTO: 83.6 FL (ref 81–100)
MONOCYTES # BLD AUTO: 1.11 X10(3) UL (ref 0.1–1)
MONOCYTES NFR BLD AUTO: 10.6 %
NEUTROPHIL ABS PRELIM: 6.75 X10 (3) UL (ref 1.3–6.7)
NEUTROPHILS # BLD AUTO: 6.75 X10(3) UL (ref 1.3–6.7)
NEUTROPHILS NFR BLD AUTO: 64.2 %
OSMOLALITY SERPL CALC.SUM OF ELEC: 292 MOSM/KG (ref 275–295)
P AXIS: 52 DEGREES
P-R INTERVAL: 146 MS
PLATELET # BLD AUTO: 304 10(3)UL (ref 150–450)
POTASSIUM SERPL-SCNC: 3.5 MMOL/L (ref 3.6–5.1)
Q-T INTERVAL: 232 MS
Q-T INTERVAL: 320 MS
QRS DURATION: 74 MS
QRS DURATION: 88 MS
QTC CALCULATION (BEZET): 425 MS
QTC CALCULATION (BEZET): 442 MS
R AXIS: 75 DEGREES
R AXIS: 84 DEGREES
RBC # BLD AUTO: 5.29 X10(6)UL (ref 3.8–5.1)
RED CELL DISTRIBUTION WIDTH-SD: 42.2 FL (ref 35.1–46.3)
SODIUM SERPL-SCNC: 139 MMOL/L (ref 136–144)
T AXIS: -77 DEGREES
T AXIS: 40 DEGREES
T4 FREE SERPL-MCNC: 0.8 NG/DL (ref 0.9–1.8)
TSI SER-ACNC: 7.26 MIU/ML (ref 0.35–5.5)
VENTRICULAR RATE: 115 BPM
VENTRICULAR RATE: 202 BPM
WBC # BLD AUTO: 10.5 X10(3) UL (ref 4–13)

## 2019-01-22 PROCEDURE — 93010 ELECTROCARDIOGRAM REPORT: CPT

## 2019-01-22 PROCEDURE — 80053 COMPREHEN METABOLIC PANEL: CPT | Performed by: EMERGENCY MEDICINE

## 2019-01-22 PROCEDURE — 99284 EMERGENCY DEPT VISIT MOD MDM: CPT

## 2019-01-22 PROCEDURE — 85025 COMPLETE CBC W/AUTO DIFF WBC: CPT | Performed by: EMERGENCY MEDICINE

## 2019-01-22 PROCEDURE — 84439 ASSAY OF FREE THYROXINE: CPT | Performed by: EMERGENCY MEDICINE

## 2019-01-22 PROCEDURE — 84443 ASSAY THYROID STIM HORMONE: CPT | Performed by: EMERGENCY MEDICINE

## 2019-01-22 PROCEDURE — 96374 THER/PROPH/DIAG INJ IV PUSH: CPT

## 2019-01-22 PROCEDURE — 93005 ELECTROCARDIOGRAM TRACING: CPT

## 2019-01-22 RX ORDER — ADENOSINE 3 MG/ML
INJECTION, SOLUTION INTRAVENOUS
Status: DISCONTINUED
Start: 2019-01-22 | End: 2019-01-22

## 2019-01-22 RX ORDER — ADENOSINE 3 MG/ML
6 INJECTION, SOLUTION INTRAVENOUS ONCE
Status: COMPLETED | OUTPATIENT
Start: 2019-01-22 | End: 2019-01-22

## 2019-01-22 RX ORDER — ADENOSINE 3 MG/ML
12 INJECTION, SOLUTION INTRAVENOUS ONCE
Status: COMPLETED | OUTPATIENT
Start: 2019-01-22 | End: 2019-01-22

## 2019-01-22 NOTE — ED PROVIDER NOTES
Patient Seen in: [facility] Emergency Department    History   Patient presents with:  Rapid Heart Beat    Stated Complaint: rapid hb    HPI    This is a pleasant 47-year-old female presenting to the emerge part for SVT she does have a history of SVT Exam    Alert and oriented patient appears no distress HEENT exam is normal lungs are clear cardiovascular exam shows tachycardia abdomen soft nontender symmetrical cyanosis or edema.     ED Course     Labs Reviewed   COMP METABOLIC PANEL (14) - Abnormal; N thyroid medication and she was also instructed to stay away from any sort of caffeinated beverages.   She is return emerge for worsening symptoms or other complaints            Disposition and Plan     Clinical Impression:  SVT (supraventricular tachycardia

## 2019-01-24 ENCOUNTER — PATIENT MESSAGE (OUTPATIENT)
Dept: FAMILY MEDICINE CLINIC | Facility: CLINIC | Age: 49
End: 2019-01-24

## 2019-01-24 NOTE — TELEPHONE ENCOUNTER
From: Zondra Hammans  To: Lyndel Cranker, DO  Sent: 1/24/2019 7:18 AM CST  Subject: Non-Urgent Medical Question    Hello. I was there with Felipe Hawkins (3/20/02) for a visit with Dr Summer Silva . He still is feeling sick and has stayed home.  I need a

## 2019-01-25 ENCOUNTER — TELEPHONE (OUTPATIENT)
Dept: SURGERY | Facility: CLINIC | Age: 49
End: 2019-01-25

## 2019-01-25 ENCOUNTER — PATIENT OUTREACH (OUTPATIENT)
Dept: FAMILY MEDICINE CLINIC | Facility: CLINIC | Age: 49
End: 2019-01-25

## 2019-01-25 DIAGNOSIS — Z12.39 SCREENING FOR BREAST CANCER: Primary | ICD-10-CM

## 2019-01-25 NOTE — TELEPHONE ENCOUNTER
Patient was here in person today to  a script but was also asking about the recommendation she received previously to an ENT. She would like the name of an ENT please. She would like it sent via my chart pls.

## 2019-01-26 NOTE — PROGRESS NOTES
Please call pt to advise them they are due for their yearly mammogram.  An order has been placed in [facility]. Patient can call central scheduling at [phone] to schedule this appt.      If patient has had this performed at another location please ask them

## 2019-01-29 NOTE — PROGRESS NOTES
Spoke with pt. After I relayed the information about her yearly mammogram she said that she will call today to schedule an appt.

## 2019-02-16 ENCOUNTER — OFFICE VISIT (OUTPATIENT)
Dept: OBGYN CLINIC | Facility: CLINIC | Age: 49
End: 2019-02-16

## 2019-02-16 VITALS
HEART RATE: 80 BPM | DIASTOLIC BLOOD PRESSURE: 90 MMHG | RESPIRATION RATE: 12 BRPM | HEIGHT: 65 IN | WEIGHT: 254 LBS | BODY MASS INDEX: 42.32 KG/M2 | TEMPERATURE: 98 F | SYSTOLIC BLOOD PRESSURE: 140 MMHG

## 2019-02-16 DIAGNOSIS — D21.9 FIBROID: ICD-10-CM

## 2019-02-16 DIAGNOSIS — N89.8 VAGINAL DISCHARGE: ICD-10-CM

## 2019-02-16 DIAGNOSIS — Z12.4 SCREENING FOR MALIGNANT NEOPLASM OF THE CERVIX: ICD-10-CM

## 2019-02-16 DIAGNOSIS — Z11.51 SCREENING FOR HUMAN PAPILLOMAVIRUS: ICD-10-CM

## 2019-02-16 DIAGNOSIS — N90.89 VULVAR LESION: ICD-10-CM

## 2019-02-16 DIAGNOSIS — Z01.419 ENCOUNTER FOR ANNUAL ROUTINE GYNECOLOGICAL EXAMINATION: Primary | ICD-10-CM

## 2019-02-16 DIAGNOSIS — Z12.39 BREAST CANCER SCREENING: ICD-10-CM

## 2019-02-16 PROCEDURE — 87510 GARDNER VAG DNA DIR PROBE: CPT | Performed by: OBSTETRICS & GYNECOLOGY

## 2019-02-16 PROCEDURE — 88175 CYTOPATH C/V AUTO FLUID REDO: CPT | Performed by: OBSTETRICS & GYNECOLOGY

## 2019-02-16 PROCEDURE — 87660 TRICHOMONAS VAGIN DIR PROBE: CPT | Performed by: OBSTETRICS & GYNECOLOGY

## 2019-02-16 PROCEDURE — 87624 HPV HI-RISK TYP POOLED RSLT: CPT | Performed by: OBSTETRICS & GYNECOLOGY

## 2019-02-16 PROCEDURE — 87529 HSV DNA AMP PROBE: CPT | Performed by: OBSTETRICS & GYNECOLOGY

## 2019-02-16 PROCEDURE — 87480 CANDIDA DNA DIR PROBE: CPT | Performed by: OBSTETRICS & GYNECOLOGY

## 2019-02-16 PROCEDURE — 99386 PREV VISIT NEW AGE 40-64: CPT | Performed by: OBSTETRICS & GYNECOLOGY

## 2019-02-16 NOTE — PROGRESS NOTES
Discoloration HPI:   Elma Yadav is a 44year old  who presents for an annual gynecological exam.   Menses: No LMP recorded. Patient has had an ablation.  Menopause: perimenopausal  She has history of very heavy menses and had a polypectomy and end daily. Disp:  Rfl: 1   hydrochlorothiazide (HYDRODIURIL) 25 MG Oral Tab Take 25 mg by mouth daily.  Disp:  Rfl:       Past Medical History:   Diagnosis Date   • Acid reflux    • Anxiety    • Depression    • [facility]    • Fibroids    • Heavy m symptoms    EXAM:   OBESE  VITALS: /90 (BP Location: Right arm, Patient Position: Sitting, Cuff Size: adult)   Pulse 80   Temp 98.4 °F (36.9 °C) (Oral)   Resp 12   Ht 65\"   Wt 254 lb   Breastfeeding?  No   BMI 42.27 kg/m²   GENERAL:  Well-appearing, these 7-10 days after her menses. · I highly encouraged pt to be compliant with her yearly screening mammograms to maintain breast health. · I encouraged pt being compliant with screening colonoscopy at the age of 46.   · I encouraged baseline Dexa if men

## 2019-02-17 LAB — HPV I/H RISK 1 DNA SPEC QL NAA+PROBE: NEGATIVE

## 2019-02-18 ENCOUNTER — ULTRASOUND ENCOUNTER (OUTPATIENT)
Dept: OBGYN CLINIC | Facility: CLINIC | Age: 49
End: 2019-02-18

## 2019-02-18 RX ORDER — FLUCONAZOLE 150 MG/1
TABLET ORAL
Qty: 2 TABLET | Refills: 0 | Status: SHIPPED | OUTPATIENT
Start: 2019-02-18 | End: 2019-02-21 | Stop reason: ALTCHOICE

## 2019-02-20 LAB — HERPES SIMPLEX VIRUS BY PCR: NOT DETECTED

## 2019-02-21 ENCOUNTER — OFFICE VISIT (OUTPATIENT)
Dept: FAMILY MEDICINE CLINIC | Facility: CLINIC | Age: 49
End: 2019-02-21

## 2019-02-21 VITALS
HEART RATE: 84 BPM | TEMPERATURE: 98 F | WEIGHT: 255 LBS | DIASTOLIC BLOOD PRESSURE: 88 MMHG | SYSTOLIC BLOOD PRESSURE: 138 MMHG | OXYGEN SATURATION: 98 % | HEIGHT: 63 IN | RESPIRATION RATE: 14 BRPM | BODY MASS INDEX: 45.18 KG/M2

## 2019-02-21 DIAGNOSIS — M25.552 ACUTE HIP PAIN, LEFT: ICD-10-CM

## 2019-02-21 DIAGNOSIS — L20.9 ATOPIC DERMATITIS, UNSPECIFIED TYPE: Primary | ICD-10-CM

## 2019-02-21 DIAGNOSIS — M25.562 ACUTE PAIN OF LEFT KNEE: ICD-10-CM

## 2019-02-21 DIAGNOSIS — M54.42 ACUTE MIDLINE LOW BACK PAIN WITH LEFT-SIDED SCIATICA: ICD-10-CM

## 2019-02-21 PROCEDURE — 99214 OFFICE O/P EST MOD 30 MIN: CPT | Performed by: FAMILY MEDICINE

## 2019-02-21 RX ORDER — IBUPROFEN 800 MG/1
800 TABLET ORAL 2 TIMES DAILY
Qty: 60 TABLET | Refills: 1 | Status: SHIPPED | OUTPATIENT
Start: 2019-02-21 | End: 2019-04-25

## 2019-02-21 RX ORDER — PREDNISONE 20 MG/1
TABLET ORAL
Qty: 20 TABLET | Refills: 0 | Status: SHIPPED | OUTPATIENT
Start: 2019-02-21 | End: 2019-03-19 | Stop reason: ALTCHOICE

## 2019-02-21 NOTE — PROGRESS NOTES
[facility] Family Medicine Office Note  Chief Complaint:   Patient presents with:  Fall: fell on ice 2/18/18 hit left leg      HPI:   This is a 44year old female coming in for left hip and knee pain along with chronic back pain and eczema.     1. Bilateral 11/7/2018    Performed by Sydni Jon MD at [facility] MAIN OR     Social History:  Social History    Tobacco Use      Smoking status: Former Smoker      Smokeless tobacco: Never Used      Tobacco comment: QUIT ABOUT 15 YEARS AGO PER PT REPORT    Alcohol Oral Tab Take 1 tablet (10 mg total) by mouth 3 (three) times daily as needed for Muscle spasms. At Bedtime Disp: 60 tablet Rfl: 2   Metoprolol Succinate ER 50 MG Oral Tablet 24 Hr Take 1 tablet by mouth daily.  Disp:  Rfl: 5   hydrochlorothiazide (HYDRODIU bilaterally upper and lower extremities, no edema noted; left hip: ROM severely diminished due to pain, cannot assess ISAK due to pain; left knee: negative valgus/varus, cannot assess ROM due to pain in left hip  NEURO:  CN 2 - 12 grossly intact     ASSES Health Maintenance:  Diabetes Care Dilated Eye Exam due on 01/03/1968  Colonoscopy due on 01/03/1968  Pneumococcal PPSV23 Medium Risk Adult(1 of 1 - PPSV23) due on 01/03/1987  Mammogram due on 06/04/2015  FIT Colorectal Screening due on 01/03/2018  I

## 2019-02-22 ENCOUNTER — HOSPITAL ENCOUNTER (OUTPATIENT)
Dept: GENERAL RADIOLOGY | Facility: HOSPITAL | Age: 49
Discharge: HOME | End: 2019-02-22
Attending: FAMILY MEDICINE
Payer: COMMERCIAL

## 2019-02-22 ENCOUNTER — HOSPITAL ENCOUNTER (OUTPATIENT)
Dept: MAMMOGRAPHY | Age: 49
End: 2019-02-22
Attending: OBSTETRICS & GYNECOLOGY
Payer: COMMERCIAL

## 2019-02-22 ENCOUNTER — TELEPHONE (OUTPATIENT)
Dept: FAMILY MEDICINE CLINIC | Facility: CLINIC | Age: 49
End: 2019-02-22

## 2019-02-22 DIAGNOSIS — M25.552 ACUTE PAIN OF LEFT HIP: ICD-10-CM

## 2019-02-22 DIAGNOSIS — M25.552 ACUTE HIP PAIN, LEFT: ICD-10-CM

## 2019-02-22 DIAGNOSIS — M25.562 ACUTE PAIN OF LEFT KNEE: Primary | ICD-10-CM

## 2019-02-22 DIAGNOSIS — M25.562 ACUTE PAIN OF LEFT KNEE: ICD-10-CM

## 2019-02-22 DIAGNOSIS — Z91.81 HISTORY OF FALL: ICD-10-CM

## 2019-02-22 DIAGNOSIS — M25.60 LIMITED JOINT RANGE OF MOTION (ROM): ICD-10-CM

## 2019-02-22 PROCEDURE — 73560 X-RAY EXAM OF KNEE 1 OR 2: CPT | Performed by: FAMILY MEDICINE

## 2019-02-22 PROCEDURE — 73502 X-RAY EXAM HIP UNI 2-3 VIEWS: CPT | Performed by: FAMILY MEDICINE

## 2019-02-22 NOTE — TELEPHONE ENCOUNTER
See below. I have pended note for work if you agree. As for PT, do you want to see what xrays show first? Looks like she is just having them now. Please advise thanks.

## 2019-02-22 NOTE — TELEPHONE ENCOUNTER
Pt said if dr johns can order phy therapy for her leg because its very hard to walk and also she needs note for yesterday for work  She will print out note from my chart

## 2019-02-25 ENCOUNTER — HOSPITAL ENCOUNTER (OUTPATIENT)
Dept: MAMMOGRAPHY | Age: 49
Discharge: HOME | End: 2019-02-25
Attending: OBSTETRICS & GYNECOLOGY
Payer: COMMERCIAL

## 2019-02-25 ENCOUNTER — TELEPHONE (OUTPATIENT)
Dept: FAMILY MEDICINE CLINIC | Facility: CLINIC | Age: 49
End: 2019-02-25

## 2019-02-25 DIAGNOSIS — Z12.39 BREAST CANCER SCREENING: ICD-10-CM

## 2019-02-25 PROCEDURE — 77063 BREAST TOMOSYNTHESIS BI: CPT | Performed by: OBSTETRICS & GYNECOLOGY

## 2019-02-25 PROCEDURE — 77067 SCR MAMMO BI INCL CAD: CPT | Performed by: OBSTETRICS & GYNECOLOGY

## 2019-02-25 NOTE — TELEPHONE ENCOUNTER
Pt walked in today requesting results of her X-Rays that she had done on 2/22/19. Pt stated she did not receive a voicemail and would like to be called on [phone] today to go over the results. Please advise.

## 2019-02-25 NOTE — TELEPHONE ENCOUNTER
**also see knee and hip xray results**  **also see hip xray results and phone encounter in call bin**  Call to pt's cell reaches identified voice mail. Left vmm req call back to triage nurse today for test results/dr instructions. Provided ofc phone hours.

## 2019-02-27 DIAGNOSIS — E03.9 ACQUIRED HYPOTHYROIDISM: ICD-10-CM

## 2019-02-27 NOTE — TELEPHONE ENCOUNTER
Pt advised of xray results with recommendations. I offered to send [employer] msg with PT info. She agrees.  Info sent via Mashalot for PT.

## 2019-03-01 ENCOUNTER — OFFICE VISIT (OUTPATIENT)
Dept: OBGYN CLINIC | Facility: CLINIC | Age: 49
End: 2019-03-01

## 2019-03-01 ENCOUNTER — PATIENT OUTREACH (OUTPATIENT)
Dept: FAMILY MEDICINE CLINIC | Facility: CLINIC | Age: 49
End: 2019-03-01

## 2019-03-01 VITALS
HEIGHT: 65 IN | TEMPERATURE: 98 F | BODY MASS INDEX: 42.32 KG/M2 | DIASTOLIC BLOOD PRESSURE: 90 MMHG | WEIGHT: 254 LBS | HEART RATE: 96 BPM | RESPIRATION RATE: 12 BRPM | SYSTOLIC BLOOD PRESSURE: 140 MMHG

## 2019-03-01 DIAGNOSIS — L98.9 DISORDER OF SKIN OR SUBCUTANEOUS TISSUE: Primary | ICD-10-CM

## 2019-03-01 PROCEDURE — 88305 TISSUE EXAM BY PATHOLOGIST: CPT | Performed by: OBSTETRICS & GYNECOLOGY

## 2019-03-01 PROCEDURE — 56605 BIOPSY OF VULVA/PERINEUM: CPT | Performed by: OBSTETRICS & GYNECOLOGY

## 2019-03-01 NOTE — PROGRESS NOTES
PUNCH BIOPSY OF left labial lesion    DIAGNOSIS:   Labial lesion with severe pruritus    HPI:   44year old  No LMP recorded. Patient has had an ablation. Angelique Herrera Here for punch biopsy of left labia. Patient is a diabetic with glucose poorly controlled.

## 2019-03-01 NOTE — PROGRESS NOTES
Please call pt to inquire if pt has had a colonoscopy in the last 10 years and if so who performed it (name and phone #). Please let Mukul Funes know so I can obtain the report.     If pt did not have a colonoscopy please advise them we will leave the FIT stoo

## 2019-03-04 ENCOUNTER — TELEPHONE (OUTPATIENT)
Dept: FAMILY MEDICINE CLINIC | Facility: CLINIC | Age: 49
End: 2019-03-04

## 2019-03-04 ENCOUNTER — OFFICE VISIT (OUTPATIENT)
Dept: SURGERY | Facility: CLINIC | Age: 49
End: 2019-03-04

## 2019-03-04 ENCOUNTER — LAB ENCOUNTER (OUTPATIENT)
Dept: LAB | Age: 49
End: 2019-03-04
Attending: FAMILY MEDICINE
Payer: COMMERCIAL

## 2019-03-04 DIAGNOSIS — M47.816 LUMBAR SPONDYLOSIS: ICD-10-CM

## 2019-03-04 DIAGNOSIS — R29.898 WEAKNESS OF BOTH LOWER EXTREMITIES: ICD-10-CM

## 2019-03-04 DIAGNOSIS — E11.9 CONTROLLED TYPE 2 DIABETES MELLITUS WITHOUT COMPLICATION, WITHOUT LONG-TERM CURRENT USE OF INSULIN (HCC): ICD-10-CM

## 2019-03-04 DIAGNOSIS — E11.65 UNCONTROLLED TYPE 2 DIABETES MELLITUS WITH HYPERGLYCEMIA, WITHOUT LONG-TERM CURRENT USE OF INSULIN (HCC): ICD-10-CM

## 2019-03-04 DIAGNOSIS — G95.9 CERVICAL MYELOPATHY WITH CERVICAL RADICULOPATHY (HCC): ICD-10-CM

## 2019-03-04 DIAGNOSIS — M54.12 CERVICAL MYELOPATHY WITH CERVICAL RADICULOPATHY (HCC): ICD-10-CM

## 2019-03-04 DIAGNOSIS — E03.9 ACQUIRED HYPOTHYROIDISM: ICD-10-CM

## 2019-03-04 DIAGNOSIS — R29.898 WEAKNESS OF BOTH UPPER EXTREMITIES: ICD-10-CM

## 2019-03-04 DIAGNOSIS — D35.2 PITUITARY ADENOMA (HCC): Primary | ICD-10-CM

## 2019-03-04 DIAGNOSIS — D35.2 PITUITARY ADENOMA (HCC): ICD-10-CM

## 2019-03-04 DIAGNOSIS — M54.5 CHRONIC BILATERAL LOW BACK PAIN, WITH SCIATICA PRESENCE UNSPECIFIED: Primary | ICD-10-CM

## 2019-03-04 DIAGNOSIS — G89.29 CHRONIC BILATERAL LOW BACK PAIN, WITH SCIATICA PRESENCE UNSPECIFIED: Primary | ICD-10-CM

## 2019-03-04 LAB
ALBUMIN SERPL-MCNC: 3.9 G/DL (ref 3.4–5)
ALBUMIN/GLOB SERPL: 0.9 {RATIO} (ref 1–2)
ALP LIVER SERPL-CCNC: 89 U/L (ref 41–108)
ALT SERPL-CCNC: 20 U/L (ref 13–56)
ANION GAP SERPL CALC-SCNC: 9 MMOL/L (ref 0–18)
AST SERPL-CCNC: 9 U/L (ref 15–37)
BILIRUB SERPL-MCNC: 0.4 MG/DL (ref 0.1–2)
BUN BLD-MCNC: 12 MG/DL (ref 7–18)
BUN/CREAT SERPL: 12.4 (ref 10–20)
CALCIUM BLD-MCNC: 9.1 MG/DL (ref 8.5–10.1)
CHLORIDE SERPL-SCNC: 105 MMOL/L (ref 98–107)
CHOLEST SMN-MCNC: 189 MG/DL (ref ?–200)
CO2 SERPL-SCNC: 23 MMOL/L (ref 21–32)
CORTIS SERPL-MCNC: 15 UG/DL
CREAT BLD-MCNC: 0.97 MG/DL (ref 0.55–1.02)
CREAT UR-SCNC: 96.5 MG/DL
EST. AVERAGE GLUCOSE BLD GHB EST-MCNC: 166 MG/DL (ref 68–126)
FSH SERPL-ACNC: 31.2 MIU/ML
GLOBULIN PLAS-MCNC: 4.2 G/DL (ref 2.8–4.4)
GLUCOSE BLD-MCNC: 115 MG/DL (ref 70–99)
HBA1C MFR BLD HPLC: 7.4 % (ref ?–5.7)
HDLC SERPL-MCNC: 39 MG/DL (ref 40–59)
LDLC SERPL CALC-MCNC: 125 MG/DL (ref ?–100)
LH SERPL-ACNC: 16.9 MIU/ML
M PROTEIN MFR SERPL ELPH: 8.1 G/DL (ref 6.4–8.2)
MICROALBUMIN UR-MCNC: 20.6 MG/DL
MICROALBUMIN/CREAT 24H UR-RTO: 213.5 UG/MG (ref ?–30)
NONHDLC SERPL-MCNC: 150 MG/DL (ref ?–130)
OSMOLALITY SERPL CALC.SUM OF ELEC: 285 MOSM/KG (ref 275–295)
POTASSIUM SERPL-SCNC: 4 MMOL/L (ref 3.5–5.1)
PROLACTIN SERPL-MCNC: 22.1 NG/ML
SODIUM SERPL-SCNC: 137 MMOL/L (ref 136–145)
T4 FREE SERPL-MCNC: 0.9 NG/DL (ref 0.8–1.7)
TRIGL SERPL-MCNC: 124 MG/DL (ref 30–149)
TSI SER-ACNC: 3.85 MIU/ML (ref 0.36–3.74)
VLDLC SERPL CALC-MCNC: 25 MG/DL (ref 0–30)

## 2019-03-04 PROCEDURE — 36415 COLL VENOUS BLD VENIPUNCTURE: CPT

## 2019-03-04 PROCEDURE — 84305 ASSAY OF SOMATOMEDIN: CPT

## 2019-03-04 PROCEDURE — 80061 LIPID PANEL: CPT

## 2019-03-04 PROCEDURE — 82024 ASSAY OF ACTH: CPT

## 2019-03-04 PROCEDURE — 99214 OFFICE O/P EST MOD 30 MIN: CPT | Performed by: PHYSICIAN ASSISTANT

## 2019-03-04 PROCEDURE — 84443 ASSAY THYROID STIM HORMONE: CPT

## 2019-03-04 PROCEDURE — 82043 UR ALBUMIN QUANTITATIVE: CPT

## 2019-03-04 PROCEDURE — 80053 COMPREHEN METABOLIC PANEL: CPT

## 2019-03-04 PROCEDURE — 82533 TOTAL CORTISOL: CPT

## 2019-03-04 PROCEDURE — 84146 ASSAY OF PROLACTIN: CPT

## 2019-03-04 PROCEDURE — 83002 ASSAY OF GONADOTROPIN (LH): CPT

## 2019-03-04 PROCEDURE — 83036 HEMOGLOBIN GLYCOSYLATED A1C: CPT

## 2019-03-04 PROCEDURE — 83001 ASSAY OF GONADOTROPIN (FSH): CPT

## 2019-03-04 PROCEDURE — 84439 ASSAY OF FREE THYROXINE: CPT

## 2019-03-04 PROCEDURE — 82570 ASSAY OF URINE CREATININE: CPT

## 2019-03-04 PROCEDURE — 83003 ASSAY GROWTH HORMONE (HGH): CPT

## 2019-03-04 RX ORDER — HYDROCODONE BITARTRATE AND ACETAMINOPHEN 10; 325 MG/1; MG/1
1 TABLET ORAL EVERY 8 HOURS PRN
Qty: 60 TABLET | Refills: 0 | Status: SHIPPED | OUTPATIENT
Start: 2019-03-04 | End: 2019-03-19

## 2019-03-04 RX ORDER — CLOBETASOL PROPIONATE 0.5 MG/G
1 OINTMENT TOPICAL 2 TIMES DAILY
Qty: 1 TUBE | Refills: 0 | Status: SHIPPED | OUTPATIENT
Start: 2019-03-04 | End: 2019-04-25

## 2019-03-04 NOTE — PATIENT INSTRUCTIONS
Refill policies:    • Allow 2-3 business days for refills; controlled substances may take longer.   • Contact your pharmacy at least 5 days prior to running out of medication and have them send an electronic request or submit request through the “request re been approved by your insurer. Depending on your insurance carrier, approval may take 3-10 days. It is highly recommended patients contact their insurance carrier directly to determine coverage.   If test is done without insurance authorization, patient ma neurosurgical intervention refer her to the pain clinic her primary care for [place]  2.     Patient is a candidate for a transsphenoidal pituitary adenoma resection. She needs to do her pituitary hormone panel.   He may need a new MRI of the pituitary as he

## 2019-03-04 NOTE — TELEPHONE ENCOUNTER
URGENT PT HAS APPT TODAY     REFERRAL FOR NEURO WITH [address] ([address])  1/3/1970    Patient Name: Teo Vargas   : [birth year]   Reason for the order/referral: Back & Neck issues   PCP: Criss King   Refer to Provider (first and last name

## 2019-03-04 NOTE — PROGRESS NOTES
[facility] Neurosurgery follow up        HISTORY OF PRESENT ILLNESS:Bertha Arias is a 44year old female here in follow-up. 1.  Pituitary adenoma: Patient states her vision is stable and intact with no deficits. She has not seen ENT.   She has not had her hor collar seems to be giving her some relief. She is not tripping as much. She does have Norco for pain she is taking about every 6 hours. 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   She w     • Anxiety     • Depression     • Hypothyroid     • Type II or unspecified type diabetes mellitus without mention of complication, not stated as uncontrolled     • Unspecified essential hypertension           PAST SURGICAL HISTORY:  Past Surgical Histor    Ankle  Hamstring   Right      1+           1+       6+        2+       0+     Left      1+           1+       1+        2+       6+           IMAGING:  MRI of the pituitary from 6/28/18  lobulated heterogeneously enhancing mass extending rightward from

## 2019-03-05 ENCOUNTER — TELEPHONE (OUTPATIENT)
Dept: SURGERY | Facility: CLINIC | Age: 49
End: 2019-03-05

## 2019-03-05 RX ORDER — LEVOTHYROXINE SODIUM 88 UG/1
88 TABLET ORAL
Qty: 90 TABLET | Refills: 0 | Status: SHIPPED | OUTPATIENT
Start: 2019-03-05 | End: 2019-04-25

## 2019-03-05 NOTE — TELEPHONE ENCOUNTER
Pt calling for lab results once reviewed by provider, pt asking for call back instead of relaying via [employer]

## 2019-03-06 ENCOUNTER — OFFICE VISIT (OUTPATIENT)
Dept: FAMILY MEDICINE CLINIC | Facility: CLINIC | Age: 49
End: 2019-03-06

## 2019-03-06 VITALS
RESPIRATION RATE: 16 BRPM | WEIGHT: 255 LBS | HEART RATE: 64 BPM | SYSTOLIC BLOOD PRESSURE: 138 MMHG | HEIGHT: 65 IN | DIASTOLIC BLOOD PRESSURE: 88 MMHG | BODY MASS INDEX: 42.49 KG/M2 | TEMPERATURE: 97 F

## 2019-03-06 DIAGNOSIS — E11.65 UNCONTROLLED TYPE 2 DIABETES MELLITUS WITH MICROALBUMINURIA, WITHOUT LONG-TERM CURRENT USE OF INSULIN (HCC): Primary | ICD-10-CM

## 2019-03-06 DIAGNOSIS — E78.2 MIXED HYPERLIPIDEMIA: ICD-10-CM

## 2019-03-06 DIAGNOSIS — Z12.11 ENCOUNTER FOR SCREENING COLONOSCOPY: ICD-10-CM

## 2019-03-06 DIAGNOSIS — E11.29 UNCONTROLLED TYPE 2 DIABETES MELLITUS WITH MICROALBUMINURIA, WITHOUT LONG-TERM CURRENT USE OF INSULIN (HCC): Primary | ICD-10-CM

## 2019-03-06 DIAGNOSIS — E03.9 ACQUIRED HYPOTHYROIDISM: ICD-10-CM

## 2019-03-06 DIAGNOSIS — R80.9 UNCONTROLLED TYPE 2 DIABETES MELLITUS WITH MICROALBUMINURIA, WITHOUT LONG-TERM CURRENT USE OF INSULIN (HCC): Primary | ICD-10-CM

## 2019-03-06 DIAGNOSIS — I10 ESSENTIAL HYPERTENSION WITH GOAL BLOOD PRESSURE LESS THAN 130/80: ICD-10-CM

## 2019-03-06 PROBLEM — IMO0002 UNCONTROLLED TYPE 2 DIABETES MELLITUS WITH MICROALBUMINURIA, WITHOUT LONG-TERM CURRENT USE OF INSULIN: Status: ACTIVE | Noted: 2018-02-05

## 2019-03-06 LAB
ADRENOCORTICOTROPIC HORMONE: 12 PG/ML
GROWTH HORMONE BASELINE: <0.05 NG/ML
IGF 1 Z SCORE CALCULATION: 0.7
IGF-1 (INSULINE-LIKE GROWTH FACTOR 1): 162 NG/ML

## 2019-03-06 PROCEDURE — 99214 OFFICE O/P EST MOD 30 MIN: CPT | Performed by: FAMILY MEDICINE

## 2019-03-06 RX ORDER — LOSARTAN POTASSIUM AND HYDROCHLOROTHIAZIDE 25; 100 MG/1; MG/1
1 TABLET ORAL DAILY
Qty: 90 TABLET | Refills: 0 | Status: SHIPPED | OUTPATIENT
Start: 2019-03-06 | End: 2019-04-19

## 2019-03-06 RX ORDER — GLIPIZIDE 5 MG/1
5 TABLET ORAL
Qty: 90 TABLET | Refills: 0 | Status: SHIPPED | OUTPATIENT
Start: 2019-03-06 | End: 2019-04-25

## 2019-03-06 NOTE — PROGRESS NOTES
Patient had appointment w/Dr. Christina Lira and discussed Colonscopy and she will be calling to set that up.

## 2019-03-06 NOTE — PROGRESS NOTES
[address] [facility] Family Medicine Office Note  Chief Complaint:   Patient presents with:  Test Results  Diabetes  Hypothyroidism      HPI:   This is a 44year old female coming in for DM2, HTN, hypothyroidism and hyperlipidemia.     1.  DM2 - The patient Performed by Jason Mcknight MD at [facility] MAIN OR     Social History:  Social History    Tobacco Use      Smoking status: Former Smoker      Smokeless tobacco: Never Used      Tobacco comment: QUIT ABOUT 15 YEARS AGO PER PT REPORT    Alcohol use: No    Drug use: No Take 1 tablet (20 mg total) by mouth nightly. Disp: 90 tablet Rfl: 1   aspirin 81 MG Oral Tab Take 1 tablet (81 mg total) by mouth daily. Disp: 90 tablet Rfl: 1   Fluticasone Propionate 50 MCG/ACT Nasal Suspension 2 sprays by Each Nare route daily.  Disp: 1 rate and rhythm, no murmurs, rubs or gallops  ABDOMEN:  Soft, nondistended, nontender, bowel sounds normal in all 4 quadrants, no hepatosplenomegaly  EXTREMITIES:  Strength intact with 5/5 bilaterally upper and lower extremities, no edema noted  NEURO:  CN 01/03/1968  Annual Physical due on 01/03/1970  Pneumococcal PPSV23 Medium Risk Adult(1 of 1 - PPSV23) due on 01/03/1987  Pap Smear,3 Years due on 01/03/1999  Mammogram,1 Yr due on 06/04/2015  Influenza Vaccine(1) due on 09/01/2017  FIT Colorectal Screening

## 2019-03-07 RX ORDER — LEVOTHYROXINE SODIUM 0.07 MG/1
TABLET ORAL
Qty: 90 TABLET | Refills: 0 | OUTPATIENT
Start: 2019-03-07

## 2019-03-13 NOTE — TELEPHONE ENCOUNTER
Patient calling back for lab results. Informed patient we will need to call her back once results are reviewed by provider. Patient verbalized understanding.

## 2019-03-14 ENCOUNTER — TELEPHONE (OUTPATIENT)
Dept: SURGERY | Facility: CLINIC | Age: 49
End: 2019-03-14

## 2019-03-17 ENCOUNTER — PATIENT MESSAGE (OUTPATIENT)
Dept: FAMILY MEDICINE CLINIC | Facility: CLINIC | Age: 49
End: 2019-03-17

## 2019-03-17 DIAGNOSIS — M25.562 ACUTE PAIN OF LEFT KNEE: ICD-10-CM

## 2019-03-17 DIAGNOSIS — M25.552 ACUTE HIP PAIN, LEFT: ICD-10-CM

## 2019-03-18 ENCOUNTER — TELEPHONE (OUTPATIENT)
Dept: FAMILY MEDICINE CLINIC | Facility: CLINIC | Age: 49
End: 2019-03-18

## 2019-03-18 ENCOUNTER — OFFICE VISIT (OUTPATIENT)
Dept: PHYSICAL THERAPY | Age: 49
End: 2019-03-18
Attending: FAMILY MEDICINE
Payer: COMMERCIAL

## 2019-03-18 DIAGNOSIS — M25.562 ACUTE PAIN OF LEFT KNEE: ICD-10-CM

## 2019-03-18 DIAGNOSIS — M25.552 ACUTE PAIN OF LEFT HIP: ICD-10-CM

## 2019-03-18 DIAGNOSIS — Z91.81 HISTORY OF FALL: ICD-10-CM

## 2019-03-18 DIAGNOSIS — M25.60 LIMITED JOINT RANGE OF MOTION (ROM): ICD-10-CM

## 2019-03-18 PROCEDURE — 97162 PT EVAL MOD COMPLEX 30 MIN: CPT

## 2019-03-18 PROCEDURE — 97530 THERAPEUTIC ACTIVITIES: CPT

## 2019-03-18 RX ORDER — METHYLPREDNISOLONE 4 MG/1
TABLET ORAL
Qty: 1 KIT | Refills: 0 | Status: SHIPPED | OUTPATIENT
Start: 2019-03-18 | End: 2019-04-26

## 2019-03-18 NOTE — TELEPHONE ENCOUNTER
Okay to order x-ray of the right hip however my concern is that she was complaining of pain in the left hip and now it is in the right hip?? Has the pain moved? She was also given a prescription for Norco 10 earlier this month. She was given 60 tablets.

## 2019-03-18 NOTE — TELEPHONE ENCOUNTER
Pt called. She is calling today to set up her PT for her left knee pain and left hip pain. She is wanting something for pain other than the 800 mg of ibuprofen she is taking bid.  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

## 2019-03-18 NOTE — TELEPHONE ENCOUNTER
Pain rx side x 4 days near groin. No fever. Agrees to medrol. Per Dr Trang Au, [facility] to see tomorrow if she needs note. We can order imaging at her vs and give norco at that time. Pt agrees to all of this. Medrol sent. appt given.  230p is soonest she can be

## 2019-03-18 NOTE — TELEPHONE ENCOUNTER
I will call her but she may ask about this mychart she sent:     Hoping I can get prednisone for the pain from fall in February. I’m still experiencing pain. Now it’s both sides.  I hope that it didn’t get worse because I also have degenerated disk      Do

## 2019-03-18 NOTE — PROGRESS NOTES
LOWER EXTREMITY EVALUATION:   Referring Physician: Dr. Anuja Aguilar  Diagnosis: L>R hip pain 2/2 to recent fall      Date of Service: 3/18/2019     PATIENT Talia Noble is a 44year old y/o female who presents to therapy today with complaints of fall since June at least- she was having HAs- she had MRI done of neck. She has pituitary tumor and waiting to get it removed. She had injections in her neck which helped with her back again. She has DM2- at baseline she has N/T fingertips.  Denies any other N demonstrates symptoms consistent with hip joint pain bilaterally. She arrives with shoes half on because she was unable to don them completely herself 2/2 to pain. She has significant weakness in musculature around hips limited testing 2/2 to pain.  She has slides 10 reps R/L   Evaluation followed by patient education provided on possible pathology, plan of care, exercises and benefits, HEP, recommendations for activities and activity modifications as needed, posture, prognosis, modalities as needed (ice vs h and treatment options and has agreed to actively participate in planning and for this course of care. Thank you for your referral. Please co-sign or sign and return this letter via fax as soon as possible to [phone].  If you have any questions, lorraine

## 2019-03-19 ENCOUNTER — OFFICE VISIT (OUTPATIENT)
Dept: FAMILY MEDICINE CLINIC | Facility: CLINIC | Age: 49
End: 2019-03-19

## 2019-03-19 VITALS
HEART RATE: 79 BPM | SYSTOLIC BLOOD PRESSURE: 116 MMHG | HEIGHT: 65 IN | TEMPERATURE: 97 F | RESPIRATION RATE: 18 BRPM | WEIGHT: 255 LBS | DIASTOLIC BLOOD PRESSURE: 58 MMHG | BODY MASS INDEX: 42.49 KG/M2

## 2019-03-19 DIAGNOSIS — M70.61 GREATER TROCHANTERIC BURSITIS OF RIGHT HIP: Primary | ICD-10-CM

## 2019-03-19 PROCEDURE — 20610 DRAIN/INJ JOINT/BURSA W/O US: CPT | Performed by: FAMILY MEDICINE

## 2019-03-19 RX ORDER — HYDROCODONE BITARTRATE AND ACETAMINOPHEN 10; 325 MG/1; MG/1
1 TABLET ORAL EVERY 8 HOURS PRN
Qty: 60 TABLET | Refills: 0 | Status: ON HOLD | OUTPATIENT
Start: 2019-03-19 | End: 2019-06-07

## 2019-03-19 RX ORDER — LIDOCAINE HYDROCHLORIDE 20 MG/ML
4 INJECTION, SOLUTION INFILTRATION; PERINEURAL ONCE
Status: COMPLETED | OUTPATIENT
Start: 2019-03-19 | End: 2019-03-19

## 2019-03-19 RX ORDER — LIDOCAINE HYDROCHLORIDE AND EPINEPHRINE BITARTRATE 20; .01 MG/ML; MG/ML
4 INJECTION, SOLUTION SUBCUTANEOUS ONCE
Status: COMPLETED | OUTPATIENT
Start: 2019-03-19 | End: 2019-03-19

## 2019-03-19 RX ORDER — TRIAMCINOLONE ACETONIDE 40 MG/ML
40 INJECTION, SUSPENSION INTRA-ARTICULAR; INTRAMUSCULAR ONCE
Status: COMPLETED | OUTPATIENT
Start: 2019-03-19 | End: 2019-03-19

## 2019-03-19 RX ADMIN — TRIAMCINOLONE ACETONIDE 40 MG: 40 INJECTION, SUSPENSION INTRA-ARTICULAR; INTRAMUSCULAR at 16:34:00

## 2019-03-19 RX ADMIN — LIDOCAINE HYDROCHLORIDE AND EPINEPHRINE BITARTRATE 4 ML: 20; .01 INJECTION, SOLUTION SUBCUTANEOUS at 16:36:00

## 2019-03-19 RX ADMIN — LIDOCAINE HYDROCHLORIDE 4 ML: 20 INJECTION, SOLUTION INFILTRATION; PERINEURAL at 16:35:00

## 2019-03-19 NOTE — PROGRESS NOTES
[address] [facility] Family Medicine Office Note  Chief Complaint:   Patient presents with:  Hip Pain  Groin Pain  Note For Work      HPI:   This is a 44year old female coming in for right hip pain. Pain started a few weeks ago.  Inciting event was unknow tablet by mouth every 8 (eight) hours as needed for Pain. Disp: 60 tablet Rfl: 0   methylPREDNISolone (MEDROL) 4 MG Oral Tablet Therapy Pack As directed. Disp: 1 kit Rfl: 0   Losartan Potassium-HCTZ 100-25 MG Oral Tab Take 1 tablet by mouth daily.  Disp: 90 /58 (BP Location: Right arm, Patient Position: Sitting, Cuff Size: large)   Pulse 79   Temp 97.1 °F (36.2 °C) (Oral)   Resp 18   Ht 65\"   Wt 255 lb   Breastfeeding?  No   BMI 42.43 kg/m²  Estimated body mass index is 42.43 kg/m² as calculated from Adult(1 of 1 - PPSV23) due on 01/03/1987  FIT Colorectal Screening due on 01/03/2018  Influenza Vaccine(1) due on 09/01/2018    Patient/Caregiver Education: Patient/Caregiver Education: There are no barriers to learning. Medical education done.    Outcome:

## 2019-03-19 NOTE — PROCEDURES
After obtaining verbal consent and discussing risks/benefits of procedure including risk of infection or bleeding, patient was agreeable for procedure. Patient was placed on the lying on her side position on the left.   3 cc of 2% lidocaine with epinephrin

## 2019-03-20 ENCOUNTER — HOSPITAL ENCOUNTER (OUTPATIENT)
Dept: GENERAL RADIOLOGY | Age: 49
Discharge: HOME | End: 2019-03-20
Attending: FAMILY MEDICINE
Payer: COMMERCIAL

## 2019-03-20 ENCOUNTER — TELEPHONE (OUTPATIENT)
Dept: FAMILY MEDICINE CLINIC | Facility: CLINIC | Age: 49
End: 2019-03-20

## 2019-03-20 ENCOUNTER — APPOINTMENT (OUTPATIENT)
Dept: PHYSICAL THERAPY | Age: 49
End: 2019-03-20
Attending: FAMILY MEDICINE
Payer: COMMERCIAL

## 2019-03-20 DIAGNOSIS — M25.551 RIGHT HIP PAIN: Primary | ICD-10-CM

## 2019-03-20 DIAGNOSIS — M25.551 RIGHT HIP PAIN: ICD-10-CM

## 2019-03-20 PROCEDURE — 73502 X-RAY EXAM HIP UNI 2-3 VIEWS: CPT | Performed by: FAMILY MEDICINE

## 2019-03-20 NOTE — TELEPHONE ENCOUNTER
I spoke with patient information and recommendations given to patient voiced understanding, requests to have x ray. Order entered.

## 2019-03-20 NOTE — TELEPHONE ENCOUNTER
Ok to check xray but I explained to her several times, it is common to still have pain for the next 2-3 days and it will steadily get better. Common to be in more pain the next day from lidocaine wearing off and needle being used for injection.

## 2019-03-20 NOTE — TELEPHONE ENCOUNTER
Patient saw dr. Deja Alanis yesterday and was given a steroid injection in hip/groin area for pain. She is still in pain and would like to have xray as they discussed yesterday. Please advise. Thank you.

## 2019-03-21 ENCOUNTER — OFFICE VISIT (OUTPATIENT)
Dept: PHYSICAL THERAPY | Age: 49
End: 2019-03-21
Attending: FAMILY MEDICINE
Payer: COMMERCIAL

## 2019-03-21 PROCEDURE — 97140 MANUAL THERAPY 1/> REGIONS: CPT

## 2019-03-21 PROCEDURE — 97110 THERAPEUTIC EXERCISES: CPT

## 2019-03-21 PROCEDURE — 97116 GAIT TRAINING THERAPY: CPT

## 2019-03-21 RX ORDER — PREDNISONE 20 MG/1
TABLET ORAL
Qty: 20 TABLET | Refills: 0 | OUTPATIENT
Start: 2019-03-21

## 2019-03-21 NOTE — PROGRESS NOTES
Dx: L>R hip pain 2/2 to recent fall           Authorized # of Visits:  8 visits Ebenezer Perera         Next MD visit: TBA  Fall Risk: standard       Precautions: none             Subjective: Pt reports she ordered cane online- she wants to go back to work tomorr Hip abduction green band 25 reps         Bridge 15 reps          Standing weight shifts- laterally 10 reps x 3 sets t/o session         Gait training in // bars 20 min bilateral UE assist with VCs for mechanics, heel to toe, symmetrical stance time and s

## 2019-03-27 ENCOUNTER — APPOINTMENT (OUTPATIENT)
Dept: PHYSICAL THERAPY | Age: 49
End: 2019-03-27
Attending: FAMILY MEDICINE
Payer: COMMERCIAL

## 2019-03-28 ENCOUNTER — OFFICE VISIT (OUTPATIENT)
Dept: PHYSICAL THERAPY | Age: 49
End: 2019-03-28
Attending: FAMILY MEDICINE
Payer: COMMERCIAL

## 2019-03-28 PROCEDURE — 97116 GAIT TRAINING THERAPY: CPT

## 2019-03-28 PROCEDURE — 97110 THERAPEUTIC EXERCISES: CPT

## 2019-03-28 NOTE — PROGRESS NOTES
Dx: L>R hip pain 2/2 to recent fall           Authorized # of Visits:  8 visits Jenn Berg         Next MD visit: TBA  Fall Risk: standard       Precautions: none             Subjective: Pt reports sharp pain is gone. It stopped Tuesday.  She returned to work reciprocally with use of handrail (8 visits)  · Pt will be indep with comprehensive HEP to maintain progress achieved in PT (ongoing)    Plan: Change in frequency: no; Plan for next session: consider very light leg press    Date: 3/21/2019  Tx#: 2/8 Date:

## 2019-04-01 ENCOUNTER — APPOINTMENT (OUTPATIENT)
Dept: PHYSICAL THERAPY | Age: 49
End: 2019-04-01
Attending: FAMILY MEDICINE
Payer: COMMERCIAL

## 2019-04-01 PROBLEM — D35.2 PITUITARY ADENOMA (HCC): Status: ACTIVE | Noted: 2019-04-01

## 2019-04-03 ENCOUNTER — TELEPHONE (OUTPATIENT)
Dept: SURGERY | Facility: CLINIC | Age: 49
End: 2019-04-03

## 2019-04-03 ENCOUNTER — APPOINTMENT (OUTPATIENT)
Dept: PHYSICAL THERAPY | Age: 49
End: 2019-04-03
Attending: FAMILY MEDICINE
Payer: COMMERCIAL

## 2019-04-03 ENCOUNTER — TELEPHONE (OUTPATIENT)
Dept: FAMILY MEDICINE CLINIC | Facility: CLINIC | Age: 49
End: 2019-04-03

## 2019-04-03 NOTE — TELEPHONE ENCOUNTER
Message forwarded to /EVERARDO Camacho to verify if surgery is to be scheduled with . If so will need surgery order.

## 2019-04-09 NOTE — PROGRESS NOTES
[facility] Family Medicine Office Note  Chief Complaint:   Patient presents with:  Diabetes: DM CHECK      HPI:   This is a 46year old female coming in for DM2, HTN, hypothyroidism, anxiety, excessive daytime sleepiness and allergic rhinitis. Final Anesthesia Post-op Assessment    Patient: Kaitlin Gilman  Procedure(s) Performed: CHEVRON BUNIONECTOMY - RIGHTREPAIR HAMMER TOE SECOND TOE RIGHT FOOT - RIGHT  Anesthesia type: Monitor Anesthesia Care    Vitals Value Taken Time   Temp 36.7 °C (98 °F) 4/9/2019 12:30 PM   Pulse 52 4/9/2019 12:47 PM   Resp 16 4/9/2019 12:30 PM   /63 4/9/2019 12:30 PM   SpO2 98 % 4/9/2019 12:47 PM   Vitals shown include unvalidated device data.    Last 24 I/O:     Intake/Output Summary (Last 24 hours) at 4/9/2019 1301  Last data filed at 4/9/2019 1300  Gross per 24 hour   Intake 1150 ml   Output --   Net 1150 ml       PATIENT LOCATION: Phase II  POST-OP VITAL SIGNS: stable  LEVEL OF CONSCIOUSNESS: participates in exam, awake, alert and answers questions appropriately  RESPIRATORY STATUS: unassisted and spontaneous ventilation  CARDIOVASCULAR: blood pressure returned to baseline and stable  HYDRATION: euvolemic    PAIN MANAGEMENT: adequately controlled  NAUSEA: None  AIRWAY PATENCY: patent  POST-OP ASSESSMENT: no complications, patient tolerated procedure well with no complications, no evidence of recall and sufficiently recovered from acute administration of anesthesia effects and able to participate in evaluation  COMPLICATIONS: none  Comments: Patient reassessed and appropriate for discharge       medications to help reduce her symptoms.         Past Medical History:   Diagnosis Date   • Acid reflux    • Anxiety    • Depression    • Hypothyroid    • Type II or unspecified type diabetes mellitus without mention of complication, not stated as uncontrol tablet Rfl: 0   Cyclobenzaprine HCl 10 MG Oral Tab Take 1 tablet (10 mg total) by mouth 3 (three) times daily as needed for Muscle spasms.  At Bedtime Disp: 60 tablet Rfl: 3   predniSONE 20 MG Oral Tab 3 tabs PO daily x 3d, 2 tabs PO daily x 3d, 1 tab PO da Regular rate and rhythm, no murmurs, rubs or gallops  ABDOMEN:  Soft, nondistended, nontender, bowel sounds normal in all 4 quadrants, no hepatosplenomegaly  EXTREMITIES:  Strength intact with 5/5 bilaterally upper and lower extremities, no edema noted  BA Sig: Take 1 tablet (81 mg total) by mouth daily. Fluticasone Propionate 50 MCG/ACT Nasal Suspension 1 Bottle 2      Si sprays by Each Nare route daily.            Health Maintenance:  Diabetes Care Foot Exam due on 1968  LDL Control due

## 2019-04-10 ENCOUNTER — HOSPITAL ENCOUNTER (OUTPATIENT)
Dept: CT IMAGING | Facility: HOSPITAL | Age: 49
Discharge: HOME | End: 2019-04-10
Attending: OTOLARYNGOLOGY
Payer: COMMERCIAL

## 2019-04-10 DIAGNOSIS — D35.2 PITUITARY ADENOMA (HCC): ICD-10-CM

## 2019-04-10 PROCEDURE — 70486 CT MAXILLOFACIAL W/O DYE: CPT | Performed by: OTOLARYNGOLOGY

## 2019-04-10 NOTE — TELEPHONE ENCOUNTER
Spoke with pt. She has no had a diabetic eye exam last year. She is aware of scheduling an appt with Dr Baldo Morales. She will have the report faxed to us once completed.

## 2019-04-11 ENCOUNTER — OFFICE VISIT (OUTPATIENT)
Dept: SURGERY | Facility: CLINIC | Age: 49
End: 2019-04-11

## 2019-04-11 VITALS — DIASTOLIC BLOOD PRESSURE: 78 MMHG | SYSTOLIC BLOOD PRESSURE: 120 MMHG | HEART RATE: 64 BPM

## 2019-04-11 DIAGNOSIS — R00.0 TACHYCARDIA: ICD-10-CM

## 2019-04-11 DIAGNOSIS — D35.2 PITUITARY MACROADENOMA WITH EXTRASELLAR EXTENSION (HCC): Primary | ICD-10-CM

## 2019-04-11 PROCEDURE — 99214 OFFICE O/P EST MOD 30 MIN: CPT | Performed by: NEUROLOGICAL SURGERY

## 2019-04-11 RX ORDER — HYDROCODONE BITARTRATE AND ACETAMINOPHEN 10; 325 MG/1; MG/1
1 TABLET ORAL EVERY 8 HOURS PRN
Qty: 60 TABLET | Refills: 0 | Status: SHIPPED | OUTPATIENT
Start: 2019-04-11 | End: 2019-04-26

## 2019-04-11 NOTE — PROGRESS NOTES
Pt is here for follow up for  Pituitary adenoma     Pt states that she has been doing PT. Pt states that she has seen some results with PT. Sine PT she hasn't had to use her cane as much.    Pt states that she needs Vicodin refill/

## 2019-04-12 ENCOUNTER — TELEPHONE (OUTPATIENT)
Dept: FAMILY MEDICINE CLINIC | Facility: CLINIC | Age: 49
End: 2019-04-12

## 2019-04-12 NOTE — TELEPHONE ENCOUNTER
Pt's job is calling because they have questions on the note that was given to pt on 4/5/19. They need more information about restrictions ect. Please advise. Thank you.

## 2019-04-15 NOTE — TELEPHONE ENCOUNTER
Lm for pt. To CB to see if her employer had any other questions for us regarding note for work restrictions.

## 2019-04-15 NOTE — TELEPHONE ENCOUNTER
Ginna pt's Director of Nursing (her boss) at [address] [place] [address] called from pt's employer. 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 She is questioning the letter written 4/5.   She said the letter said to please excuse her \"for the past few days of absence for work due to going

## 2019-04-15 NOTE — TELEPHONE ENCOUNTER
This is why I do not give letters for past absence from work. I cannot verify every day of physical therapy for the patient. I had no idea that she has had over 60 absences from work.   She had told me that she had missed the last few days by going to [facility]

## 2019-04-15 NOTE — TELEPHONE ENCOUNTER
Ginna, Director of Nursing at [employer] center returning call and 's progress note read to her. Ginna will instruct pt if she needs more time off for work pt will need to complete FMLA papers to secure her job.

## 2019-04-16 ENCOUNTER — TELEPHONE (OUTPATIENT)
Dept: SURGERY | Facility: CLINIC | Age: 49
End: 2019-04-16

## 2019-04-16 NOTE — TELEPHONE ENCOUNTER
Called and spoke to Pt. Pt states that she received medication Norco  Mg  at [address] [place] [address] 4/11/19 with Dr Jenny Garcia. Pt states that she didn't dispense medication on Thursday.      Pt states that she placed medication script on desk with either recycling or

## 2019-04-16 NOTE — TELEPHONE ENCOUNTER
Pt states that she also misplaced referrals that Dr Jose L Mcpherson had referred Pt to. Pt would like  Those sent via light. Sent referrals via [address] [place] [address] [address].

## 2019-04-19 ENCOUNTER — HOSPITAL ENCOUNTER (OUTPATIENT)
Dept: MRI IMAGING | Facility: HOSPITAL | Age: 49
Discharge: HOME | End: 2019-04-19
Attending: NEUROLOGICAL SURGERY
Payer: COMMERCIAL

## 2019-04-19 DIAGNOSIS — E23.6 PITUITARY MASS (HCC): Primary | ICD-10-CM

## 2019-04-19 DIAGNOSIS — D35.2 PITUITARY MACROADENOMA WITH EXTRASELLAR EXTENSION (HCC): ICD-10-CM

## 2019-04-19 PROCEDURE — 70553 MRI BRAIN STEM W/O & W/DYE: CPT | Performed by: NEUROLOGICAL SURGERY

## 2019-04-19 PROCEDURE — A9575 INJ GADOTERATE MEGLUMI 0.1ML: HCPCS | Performed by: NEUROLOGICAL SURGERY

## 2019-04-19 PROCEDURE — 82565 ASSAY OF CREATININE: CPT

## 2019-04-24 NOTE — TELEPHONE ENCOUNTER
Patient never heard back from Dr. Michael Fontaine. Has not been to work due to pain and she has no medication. Please call. [phone]. States it's been a week.

## 2019-04-25 DIAGNOSIS — F41.1 GENERALIZED ANXIETY DISORDER: ICD-10-CM

## 2019-04-25 DIAGNOSIS — E11.65 UNCONTROLLED TYPE 2 DIABETES MELLITUS WITH MICROALBUMINURIA, WITHOUT LONG-TERM CURRENT USE OF INSULIN (HCC): ICD-10-CM

## 2019-04-25 DIAGNOSIS — E11.65 UNCONTROLLED TYPE 2 DIABETES MELLITUS WITH HYPERGLYCEMIA, WITHOUT LONG-TERM CURRENT USE OF INSULIN (HCC): ICD-10-CM

## 2019-04-25 DIAGNOSIS — R80.9 UNCONTROLLED TYPE 2 DIABETES MELLITUS WITH MICROALBUMINURIA, WITHOUT LONG-TERM CURRENT USE OF INSULIN (HCC): ICD-10-CM

## 2019-04-25 DIAGNOSIS — L20.9 ATOPIC DERMATITIS, UNSPECIFIED TYPE: ICD-10-CM

## 2019-04-25 DIAGNOSIS — E11.29 UNCONTROLLED TYPE 2 DIABETES MELLITUS WITH MICROALBUMINURIA, WITHOUT LONG-TERM CURRENT USE OF INSULIN (HCC): ICD-10-CM

## 2019-04-25 DIAGNOSIS — J30.2 SEASONAL ALLERGIC RHINITIS, UNSPECIFIED TRIGGER: ICD-10-CM

## 2019-04-25 DIAGNOSIS — M54.42 ACUTE MIDLINE LOW BACK PAIN WITH LEFT-SIDED SCIATICA: ICD-10-CM

## 2019-04-26 ENCOUNTER — TELEPHONE (OUTPATIENT)
Dept: SURGERY | Facility: CLINIC | Age: 49
End: 2019-04-26

## 2019-04-26 DIAGNOSIS — D35.2 PITUITARY ADENOMA (HCC): Primary | ICD-10-CM

## 2019-04-26 RX ORDER — ALPRAZOLAM 0.5 MG/1
0.5 TABLET ORAL 3 TIMES DAILY PRN
Qty: 60 TABLET | Refills: 2 | Status: SHIPPED | OUTPATIENT
Start: 2019-04-26 | End: 2019-06-11

## 2019-04-26 RX ORDER — IBUPROFEN 800 MG/1
800 TABLET ORAL 2 TIMES DAILY
Qty: 60 TABLET | Refills: 1 | Status: ON HOLD | OUTPATIENT
Start: 2019-04-26 | End: 2019-06-07

## 2019-04-26 RX ORDER — CYCLOBENZAPRINE HCL 10 MG
10 TABLET ORAL 3 TIMES DAILY PRN
Qty: 90 TABLET | Refills: 2 | Status: ON HOLD | OUTPATIENT
Start: 2019-04-26 | End: 2019-06-07

## 2019-04-26 RX ORDER — CLOBETASOL PROPIONATE 0.5 MG/G
1 OINTMENT TOPICAL 2 TIMES DAILY
Qty: 1 TUBE | Refills: 0 | Status: SHIPPED | OUTPATIENT
Start: 2019-04-26 | End: 2019-12-02

## 2019-04-26 RX ORDER — LEVOTHYROXINE SODIUM 88 UG/1
88 TABLET ORAL
Qty: 90 TABLET | Refills: 0 | Status: SHIPPED | OUTPATIENT
Start: 2019-04-26 | End: 2019-08-11

## 2019-04-26 RX ORDER — FLUTICASONE PROPIONATE 50 MCG
2 SPRAY, SUSPENSION (ML) NASAL DAILY
Qty: 1 BOTTLE | Refills: 2 | Status: SHIPPED | OUTPATIENT
Start: 2019-04-26 | End: 2019-10-24

## 2019-04-26 RX ORDER — GLIPIZIDE 5 MG/1
5 TABLET ORAL
Qty: 90 TABLET | Refills: 0 | Status: SHIPPED | OUTPATIENT
Start: 2019-04-26 | End: 2019-05-28 | Stop reason: DRUGHIGH

## 2019-04-26 RX ORDER — HYDROCODONE BITARTRATE AND ACETAMINOPHEN 10; 325 MG/1; MG/1
1 TABLET ORAL EVERY 6 HOURS PRN
Qty: 90 TABLET | Refills: 0 | Status: SHIPPED | OUTPATIENT
Start: 2019-04-26 | End: 2019-05-21

## 2019-04-26 NOTE — TELEPHONE ENCOUNTER
PC with patient. Aware refill was done by doctor for Clobetasol. She is to make sure it is applied sparingly to area. Understands.

## 2019-04-26 NOTE — TELEPHONE ENCOUNTER
Patient states she shredded some documentation and thinks she shredded her prescription.   She has not refilled the Aleksandra Gonzales was sent to her pharmacy over the weekend she can take over-the-counter Tylenol        Norco prescription on Monday will k

## 2019-04-26 NOTE — TELEPHONE ENCOUNTER
Medication: Cyclobenzaprine 10 Mg     Date of last refill: 11/26/18 60 Tabs 2 Refills     [place]     Last office visit: 4/11/19     Due back to clinic per last office note: NA     Date next office visit scheduled: No

## 2019-04-26 NOTE — TELEPHONE ENCOUNTER
Glipizide  Last OV relevant to medication: 3/6/2019  Last refill date: 3/6/2019  90   #/refills: 0  When pt was asked to return for OV: f/u 2 weeks  Upcoming appt/reason: n/a    Levothyroxine  Last OV relevant to medication: 3/6/2019  Last refill date: 3/5

## 2019-04-29 ENCOUNTER — TELEPHONE (OUTPATIENT)
Dept: FAMILY MEDICINE CLINIC | Facility: CLINIC | Age: 49
End: 2019-04-29

## 2019-04-29 DIAGNOSIS — E11.29 UNCONTROLLED TYPE 2 DIABETES MELLITUS WITH MICROALBUMINURIA, WITHOUT LONG-TERM CURRENT USE OF INSULIN (HCC): Primary | ICD-10-CM

## 2019-04-29 DIAGNOSIS — R80.9 UNCONTROLLED TYPE 2 DIABETES MELLITUS WITH MICROALBUMINURIA, WITHOUT LONG-TERM CURRENT USE OF INSULIN (HCC): Primary | ICD-10-CM

## 2019-04-29 DIAGNOSIS — E11.65 UNCONTROLLED TYPE 2 DIABETES MELLITUS WITH MICROALBUMINURIA, WITHOUT LONG-TERM CURRENT USE OF INSULIN (HCC): Primary | ICD-10-CM

## 2019-04-29 NOTE — TELEPHONE ENCOUNTER
Guillaume Michael from Dr. Too Serna office called asking for referral.  Pt is seeing Dr. Andrew Verde today at 10:15 for eye exam.    PROVIDER:  Alex Andrew    DX: Z01.00 -- Encounter for examination of eyes and vision without abnormal findings    CPT:  28021

## 2019-04-29 NOTE — TELEPHONE ENCOUNTER
Patient will need to be scheduled for pituitary surgery. Will need to coordinate with 's office. Per EVERARDO Medina: \"Patient is seen ENT is seeing ophthalmology on Monday.  She has had a CT scan. Shakira Coyle is requesting May 14. \"

## 2019-04-30 NOTE — TELEPHONE ENCOUNTER
LM with Rachel Hawk- 's  to [facility] to discuss surgery dates.

## 2019-04-30 NOTE — TELEPHONE ENCOUNTER
Spoke with [facility]  and reviewed possible surgery dates. Next available surgery date is 6/4/19 at 1:00 pm. Will need to verify date with patient and call [facility]  back.

## 2019-04-30 NOTE — TELEPHONE ENCOUNTER
Patient to be scheduled for pituitary tumor removal on date TBD with  and . Pre-op instructions to be discussed with patient:    · You will need to contact the Pre-admission department at [phone].  You will speak with a nurse who ENT 2 weeks post-op. · A 4 week post operative appointment will be made with the neurosurgeon.       Post operative Instructions:  · After discharge, you should avoid any strenuous activities, avoid heavy lifting,  · Avoid bending over, this will increase

## 2019-05-01 NOTE — TELEPHONE ENCOUNTER
Spoke with patient and scheduled surgery for 6/4/19 at 1 PM with Dr Alejandro Barnes and Dr Kaelyn Zhou. Eneidayusef Rom @ Dr [facility] Greene office notified. She will call patient to schedule an appointment with Dr Kaelyn Zhou.      PCP clearance faxed to Dr Palmer [facility] office and Cardiolog

## 2019-05-02 ENCOUNTER — TELEPHONE (OUTPATIENT)
Dept: FAMILY MEDICINE CLINIC | Facility: CLINIC | Age: 49
End: 2019-05-02

## 2019-05-02 DIAGNOSIS — E11.65 UNCONTROLLED TYPE 2 DIABETES MELLITUS WITH MICROALBUMINURIA, WITHOUT LONG-TERM CURRENT USE OF INSULIN (HCC): ICD-10-CM

## 2019-05-02 DIAGNOSIS — R80.9 UNCONTROLLED TYPE 2 DIABETES MELLITUS WITH MICROALBUMINURIA, WITHOUT LONG-TERM CURRENT USE OF INSULIN (HCC): ICD-10-CM

## 2019-05-02 DIAGNOSIS — E78.2 MIXED HYPERLIPIDEMIA: ICD-10-CM

## 2019-05-02 DIAGNOSIS — E11.29 UNCONTROLLED TYPE 2 DIABETES MELLITUS WITH MICROALBUMINURIA, WITHOUT LONG-TERM CURRENT USE OF INSULIN (HCC): ICD-10-CM

## 2019-05-02 DIAGNOSIS — I47.1 SVT (SUPRAVENTRICULAR TACHYCARDIA) (HCC): ICD-10-CM

## 2019-05-02 DIAGNOSIS — Z01.818 PRE-OP TESTING: Primary | ICD-10-CM

## 2019-05-02 NOTE — TELEPHONE ENCOUNTER
Cardiac clearance received form Dr Leidy Lim, [facility] Cardiology    Per OV notes 4/19/19-  no absolute cardiac contraindication to pituitary procedure

## 2019-05-02 NOTE — TELEPHONE ENCOUNTER
Received request for an H&P, CBC, CMP, PT/PTT/INR, EKG, CXR if indicated, MRSA/MRSSA nasal swab to be done for pt's Transpenoidal Hypophysectomy on 6/4/19 with Dr Jameson Cali. Orders entered. Called to pt, scheduled pt for H&P.   Advised of testing needed and

## 2019-05-03 ENCOUNTER — TELEPHONE (OUTPATIENT)
Dept: SURGERY | Facility: CLINIC | Age: 49
End: 2019-05-03

## 2019-05-03 NOTE — PROGRESS NOTES
[facility]    Luis Maries  4/11/2019    Diagnosis: Pituitary macroadenoma. Cervical spondylosis with myelopathy. Lumbar spondylosis with back pain. Chief complaint: Her back and neck pain remain stable.     Interval Histor

## 2019-05-05 ENCOUNTER — PATIENT OUTREACH (OUTPATIENT)
Dept: FAMILY MEDICINE CLINIC | Facility: CLINIC | Age: 49
End: 2019-05-05

## 2019-05-05 NOTE — PROGRESS NOTES
Please call pt to inquire if pt has had a colonoscopy in the last 10 years and if so who performed it (name and phone #). Please let Cmny Marina know so I can obtain the report.     If pt did not have a colonoscopy please advise them we will leave the FIT stoo

## 2019-05-06 NOTE — TELEPHONE ENCOUNTER
Spoke with patient. She stated she took time off of work 4/15/19 to 5/1/19 since she was in a lot of pain and experiencing body aches . She went back to work 5/1/19 and was informed that she needs needs a Dr 's note to return to work.    Patient stated sh

## 2019-05-08 ENCOUNTER — TELEPHONE (OUTPATIENT)
Dept: FAMILY MEDICINE CLINIC | Facility: CLINIC | Age: 49
End: 2019-05-08

## 2019-05-08 NOTE — TELEPHONE ENCOUNTER
Pt called, I was with a pt had to call pt back. Call to pt reaches matt melendez for pt that I would call her back tomorrow morning.

## 2019-05-16 ENCOUNTER — PATIENT MESSAGE (OUTPATIENT)
Dept: SURGERY | Facility: CLINIC | Age: 49
End: 2019-05-16

## 2019-05-16 NOTE — TELEPHONE ENCOUNTER
From: Charmayne More  To: Champ Shea  Sent: 5/16/2019 3:26 PM CDT  Subject: Non-Urgent Medical Question    Hello.  The letter that is for time off should be dated from 6/5/2019 until 6/19/2019 please and thank you

## 2019-05-17 NOTE — TELEPHONE ENCOUNTER
Referral #[id number] initiated for coverage of inpatient Transphenoidal Hypophysectomy ([id number]), clinical notes submitted, pending review.

## 2019-05-22 ENCOUNTER — LAB ENCOUNTER (OUTPATIENT)
Dept: LAB | Age: 49
End: 2019-05-22
Attending: FAMILY MEDICINE
Payer: COMMERCIAL

## 2019-05-22 ENCOUNTER — APPOINTMENT (OUTPATIENT)
Dept: LAB | Facility: HOSPITAL | Age: 49
End: 2019-05-22
Attending: FAMILY MEDICINE
Payer: COMMERCIAL

## 2019-05-22 DIAGNOSIS — R80.9 UNCONTROLLED TYPE 2 DIABETES MELLITUS WITH MICROALBUMINURIA, WITHOUT LONG-TERM CURRENT USE OF INSULIN (HCC): ICD-10-CM

## 2019-05-22 DIAGNOSIS — D35.2 PITUITARY MACROADENOMA (HCC): ICD-10-CM

## 2019-05-22 DIAGNOSIS — E11.65 UNCONTROLLED TYPE 2 DIABETES MELLITUS WITH MICROALBUMINURIA, WITHOUT LONG-TERM CURRENT USE OF INSULIN (HCC): ICD-10-CM

## 2019-05-22 DIAGNOSIS — R19.7 DIARRHEA, UNSPECIFIED TYPE: ICD-10-CM

## 2019-05-22 DIAGNOSIS — Z01.818 PRE-OP TESTING: ICD-10-CM

## 2019-05-22 DIAGNOSIS — E03.9 ACQUIRED HYPOTHYROIDISM: ICD-10-CM

## 2019-05-22 DIAGNOSIS — I47.1 SVT (SUPRAVENTRICULAR TACHYCARDIA) (HCC): ICD-10-CM

## 2019-05-22 DIAGNOSIS — E11.29 UNCONTROLLED TYPE 2 DIABETES MELLITUS WITH MICROALBUMINURIA, WITHOUT LONG-TERM CURRENT USE OF INSULIN (HCC): ICD-10-CM

## 2019-05-22 DIAGNOSIS — E78.2 MIXED HYPERLIPIDEMIA: ICD-10-CM

## 2019-05-22 DIAGNOSIS — E03.8 SECONDARY HYPOTHYROIDISM: ICD-10-CM

## 2019-05-22 PROCEDURE — 85610 PROTHROMBIN TIME: CPT

## 2019-05-22 PROCEDURE — 83036 HEMOGLOBIN GLYCOSYLATED A1C: CPT

## 2019-05-22 PROCEDURE — 86003 ALLG SPEC IGE CRUDE XTRC EA: CPT

## 2019-05-22 PROCEDURE — 84481 FREE ASSAY (FT-3): CPT

## 2019-05-22 PROCEDURE — 85025 COMPLETE CBC W/AUTO DIFF WBC: CPT

## 2019-05-22 PROCEDURE — 82533 TOTAL CORTISOL: CPT

## 2019-05-22 PROCEDURE — 84443 ASSAY THYROID STIM HORMONE: CPT

## 2019-05-22 PROCEDURE — 93005 ELECTROCARDIOGRAM TRACING: CPT

## 2019-05-22 PROCEDURE — 87081 CULTURE SCREEN ONLY: CPT

## 2019-05-22 PROCEDURE — 85730 THROMBOPLASTIN TIME PARTIAL: CPT

## 2019-05-22 PROCEDURE — 82024 ASSAY OF ACTH: CPT

## 2019-05-22 PROCEDURE — 80053 COMPREHEN METABOLIC PANEL: CPT

## 2019-05-22 PROCEDURE — 93010 ELECTROCARDIOGRAM REPORT: CPT | Performed by: INTERNAL MEDICINE

## 2019-05-22 PROCEDURE — 84305 ASSAY OF SOMATOMEDIN: CPT

## 2019-05-22 PROCEDURE — 84146 ASSAY OF PROLACTIN: CPT

## 2019-05-22 PROCEDURE — 84439 ASSAY OF FREE THYROXINE: CPT

## 2019-05-22 PROCEDURE — 36415 COLL VENOUS BLD VENIPUNCTURE: CPT

## 2019-05-23 ENCOUNTER — TELEPHONE (OUTPATIENT)
Dept: FAMILY MEDICINE CLINIC | Facility: CLINIC | Age: 49
End: 2019-05-23

## 2019-05-23 ENCOUNTER — OFFICE VISIT (OUTPATIENT)
Dept: FAMILY MEDICINE CLINIC | Facility: CLINIC | Age: 49
End: 2019-05-23

## 2019-05-23 VITALS
RESPIRATION RATE: 14 BRPM | WEIGHT: 253 LBS | SYSTOLIC BLOOD PRESSURE: 132 MMHG | DIASTOLIC BLOOD PRESSURE: 80 MMHG | HEART RATE: 95 BPM | BODY MASS INDEX: 42.15 KG/M2 | TEMPERATURE: 98 F | HEIGHT: 65 IN

## 2019-05-23 DIAGNOSIS — D35.2 PITUITARY MACROADENOMA WITH EXTRASELLAR EXTENSION (HCC): ICD-10-CM

## 2019-05-23 DIAGNOSIS — R80.9 UNCONTROLLED TYPE 2 DIABETES MELLITUS WITH MICROALBUMINURIA, WITHOUT LONG-TERM CURRENT USE OF INSULIN (HCC): ICD-10-CM

## 2019-05-23 DIAGNOSIS — Z01.818 PREOPERATIVE CLEARANCE: Primary | ICD-10-CM

## 2019-05-23 DIAGNOSIS — E11.29 UNCONTROLLED TYPE 2 DIABETES MELLITUS WITH MICROALBUMINURIA, WITHOUT LONG-TERM CURRENT USE OF INSULIN (HCC): ICD-10-CM

## 2019-05-23 DIAGNOSIS — I10 ESSENTIAL HYPERTENSION WITH GOAL BLOOD PRESSURE LESS THAN 130/80: ICD-10-CM

## 2019-05-23 DIAGNOSIS — E11.65 UNCONTROLLED TYPE 2 DIABETES MELLITUS WITH MICROALBUMINURIA, WITHOUT LONG-TERM CURRENT USE OF INSULIN (HCC): ICD-10-CM

## 2019-05-23 PROCEDURE — 99214 OFFICE O/P EST MOD 30 MIN: CPT | Performed by: FAMILY MEDICINE

## 2019-05-23 NOTE — TELEPHONE ENCOUNTER
Pt calling wondering if she could get a prescription for a machine to check her blood sugars. Please advise.

## 2019-05-23 NOTE — TELEPHONE ENCOUNTER
Pt called back. She is not sure which meter. She said she borrowed one from a friend but gave it back to her. She said she will find out more details and send via [employer]. She will also need strips and lancets.  Pt will include this in her request. She stat

## 2019-05-23 NOTE — TELEPHONE ENCOUNTER
Need more info on which meter she is requesting. Tc to pt to inquire. Her vmail box is full. Will try her again tomorrow.

## 2019-05-24 ENCOUNTER — ANESTHESIA EVENT (OUTPATIENT)
Dept: SURGERY | Facility: HOSPITAL | Age: 49
End: 2019-05-24

## 2019-05-24 NOTE — PROGRESS NOTES
[facility] Family Medicine Office Note  Chief Complaint:   Patient presents with:  Pre-Op Exam: surgery 6/4/19 Dr Neyda Sanford,      HPI:   This is a 44year old female coming in for preop medical clearance for transsphenoidal hypophysectomy to be done ABOUT 15 YEARS AGO PER PT REPORT    Alcohol use: Never      Frequency: Never    Drug use: Never    Family History:  Family History   Problem Relation Age of Onset   • Hypertension Father    • Diabetes Father    • Hypertension Mother    • Diabetes Mother spasms. At Bedtime Disp: 90 tablet Rfl: 2   aspirin 81 MG Oral Tab Take 1 tablet (81 mg total) by mouth daily. Disp: 90 tablet Rfl: 1   ibuprofen 800 MG Oral Tab Take 1 tablet (800 mg total) by mouth 2 (two) times daily.  Disp: 60 tablet Rfl: 1       and rhythm, no murmurs, rubs or gallops  ABDOMEN:  Soft, nondistended, nontender, bowel sounds normal in all 4 quadrants, no hepatosplenomegaly  EXTREMITIES:  Strength intact with 5/5 bilaterally upper and lower extremities, no edema noted  NEURO:  CN 2 - pain     Essential hypertension with goal blood pressure less than 130/80     Diabetes mellitus type 2 in obese [facility])     Acquired hypothyroidism     Cholecystitis     Uncontrolled type 2 diabetes mellitus with microalbuminuria, without long-term current u

## 2019-05-28 DIAGNOSIS — E11.29 UNCONTROLLED TYPE 2 DIABETES MELLITUS WITH MICROALBUMINURIA, WITHOUT LONG-TERM CURRENT USE OF INSULIN (HCC): Primary | ICD-10-CM

## 2019-05-28 DIAGNOSIS — E11.65 UNCONTROLLED TYPE 2 DIABETES MELLITUS WITH MICROALBUMINURIA, WITHOUT LONG-TERM CURRENT USE OF INSULIN (HCC): Primary | ICD-10-CM

## 2019-05-28 DIAGNOSIS — R80.9 UNCONTROLLED TYPE 2 DIABETES MELLITUS WITH MICROALBUMINURIA, WITHOUT LONG-TERM CURRENT USE OF INSULIN (HCC): Primary | ICD-10-CM

## 2019-05-28 RX ORDER — GLIPIZIDE 10 MG/1
10 TABLET ORAL DAILY
Qty: 90 TABLET | Refills: 0 | Status: SHIPPED | OUTPATIENT
Start: 2019-05-28 | End: 2019-06-11

## 2019-05-29 NOTE — TELEPHONE ENCOUNTER
Per PCP- on 5/23/19: \"Preoperative clearance  -  Based on stable labs and physical exam, patient is medically clear for transphenoidal hypophysectomy with Drs Lance Blizzard and Preet at [facility] on 6/4/19  -  BP controlled  -  A1c stable at 7.3\"

## 2019-05-30 NOTE — TELEPHONE ENCOUNTER
Spoke with Ricki who stated she will take a look at the request.    PA still pending will check back later on today.

## 2019-05-30 NOTE — TELEPHONE ENCOUNTER
Per [facility] surgery authorized. Referral #: [id number]    Nothing further needed for upcoming surgery.

## 2019-06-04 ENCOUNTER — HOSPITAL ENCOUNTER (INPATIENT)
Facility: HOSPITAL | Age: 49
LOS: 4 days | Discharge: HOME | DRG: 614 | End: 2019-06-08
Attending: NEUROLOGICAL SURGERY | Admitting: NEUROLOGICAL SURGERY
Payer: COMMERCIAL

## 2019-06-04 ENCOUNTER — ANESTHESIA (OUTPATIENT)
Dept: SURGERY | Facility: HOSPITAL | Age: 49
End: 2019-06-04

## 2019-06-04 DIAGNOSIS — D35.2 PITUITARY ADENOMA (HCC): ICD-10-CM

## 2019-06-04 PROCEDURE — 99291 CRITICAL CARE FIRST HOUR: CPT | Performed by: NURSE PRACTITIONER

## 2019-06-04 PROCEDURE — 0GB04ZZ EXCISION OF PITUITARY GLAND, PERCUTANEOUS ENDOSCOPIC APPROACH: ICD-10-PCS | Performed by: NEUROLOGICAL SURGERY

## 2019-06-04 DEVICE — DURAL SEALANT EXT TIP: Type: IMPLANTABLE DEVICE | Site: NOSE | Status: FUNCTIONAL

## 2019-06-04 RX ORDER — LIDOCAINE HYDROCHLORIDE AND EPINEPHRINE 10; 10 MG/ML; UG/ML
INJECTION, SOLUTION INFILTRATION; PERINEURAL AS NEEDED
Status: DISCONTINUED | OUTPATIENT
Start: 2019-06-04 | End: 2019-06-04 | Stop reason: HOSPADM

## 2019-06-04 RX ORDER — SODIUM CHLORIDE, SODIUM LACTATE, POTASSIUM CHLORIDE, CALCIUM CHLORIDE 600; 310; 30; 20 MG/100ML; MG/100ML; MG/100ML; MG/100ML
INJECTION, SOLUTION INTRAVENOUS CONTINUOUS
Status: DISCONTINUED | OUTPATIENT
Start: 2019-06-04 | End: 2019-06-04

## 2019-06-04 RX ORDER — HYDROMORPHONE HYDROCHLORIDE 1 MG/ML
0.4 INJECTION, SOLUTION INTRAMUSCULAR; INTRAVENOUS; SUBCUTANEOUS EVERY 5 MIN PRN
Status: DISCONTINUED | OUTPATIENT
Start: 2019-06-04 | End: 2019-06-04

## 2019-06-04 RX ORDER — BACITRACIN 50000 [USP'U]/1
INJECTION, POWDER, LYOPHILIZED, FOR SOLUTION INTRAMUSCULAR AS NEEDED
Status: DISCONTINUED | OUTPATIENT
Start: 2019-06-04 | End: 2019-06-04 | Stop reason: HOSPADM

## 2019-06-04 RX ORDER — HYDROMORPHONE HYDROCHLORIDE 1 MG/ML
0.4 INJECTION, SOLUTION INTRAMUSCULAR; INTRAVENOUS; SUBCUTANEOUS EVERY 5 MIN PRN
Status: DISCONTINUED | OUTPATIENT
Start: 2019-06-04 | End: 2019-06-04 | Stop reason: HOSPADM

## 2019-06-04 RX ORDER — HYDROMORPHONE HYDROCHLORIDE 1 MG/ML
INJECTION, SOLUTION INTRAMUSCULAR; INTRAVENOUS; SUBCUTANEOUS
Status: DISPENSED
Start: 2019-06-04 | End: 2019-06-05

## 2019-06-04 RX ORDER — DIPHENHYDRAMINE HYDROCHLORIDE 50 MG/ML
12.5 INJECTION INTRAMUSCULAR; INTRAVENOUS AS NEEDED
Status: DISCONTINUED | OUTPATIENT
Start: 2019-06-04 | End: 2019-06-04 | Stop reason: HOSPADM

## 2019-06-04 RX ORDER — HYDROCODONE BITARTRATE AND ACETAMINOPHEN 5; 325 MG/1; MG/1
2 TABLET ORAL AS NEEDED
Status: DISCONTINUED | OUTPATIENT
Start: 2019-06-04 | End: 2019-06-04 | Stop reason: HOSPADM

## 2019-06-04 RX ORDER — BACITRACIN ZINC AND POLYMYXIN B SULFATE 500; 10000 [USP'U]/G; [USP'U]/G
OINTMENT TOPICAL AS NEEDED
Status: DISCONTINUED | OUTPATIENT
Start: 2019-06-04 | End: 2019-06-04 | Stop reason: HOSPADM

## 2019-06-04 RX ORDER — NALOXONE HYDROCHLORIDE 0.4 MG/ML
80 INJECTION, SOLUTION INTRAMUSCULAR; INTRAVENOUS; SUBCUTANEOUS AS NEEDED
Status: DISCONTINUED | OUTPATIENT
Start: 2019-06-04 | End: 2019-06-04 | Stop reason: HOSPADM

## 2019-06-04 RX ORDER — DOCUSATE SODIUM 100 MG/1
100 CAPSULE, LIQUID FILLED ORAL 2 TIMES DAILY
Status: DISCONTINUED | OUTPATIENT
Start: 2019-06-04 | End: 2019-06-08

## 2019-06-04 RX ORDER — ATORVASTATIN CALCIUM 20 MG/1
20 TABLET, FILM COATED ORAL NIGHTLY
Status: DISCONTINUED | OUTPATIENT
Start: 2019-06-05 | End: 2019-06-08

## 2019-06-04 RX ORDER — ACETAMINOPHEN 325 MG/1
650 TABLET ORAL EVERY 6 HOURS PRN
Status: DISCONTINUED | OUTPATIENT
Start: 2019-06-04 | End: 2019-06-08

## 2019-06-04 RX ORDER — HYDROCODONE BITARTRATE AND ACETAMINOPHEN 5; 325 MG/1; MG/1
1 TABLET ORAL EVERY 4 HOURS PRN
Status: DISCONTINUED | OUTPATIENT
Start: 2019-06-04 | End: 2019-06-06

## 2019-06-04 RX ORDER — BISACODYL 10 MG
10 SUPPOSITORY, RECTAL RECTAL
Status: DISCONTINUED | OUTPATIENT
Start: 2019-06-04 | End: 2019-06-08

## 2019-06-04 RX ORDER — ACETAMINOPHEN 500 MG
1000 TABLET ORAL ONCE
Status: COMPLETED | OUTPATIENT
Start: 2019-06-04 | End: 2019-06-04

## 2019-06-04 RX ORDER — ONDANSETRON 2 MG/ML
4 INJECTION INTRAMUSCULAR; INTRAVENOUS AS NEEDED
Status: DISCONTINUED | OUTPATIENT
Start: 2019-06-04 | End: 2019-06-04 | Stop reason: HOSPADM

## 2019-06-04 RX ORDER — METOPROLOL SUCCINATE 50 MG/1
50 TABLET, EXTENDED RELEASE ORAL
Status: DISCONTINUED | OUTPATIENT
Start: 2019-06-05 | End: 2019-06-08

## 2019-06-04 RX ORDER — DEXTROSE MONOHYDRATE 25 G/50ML
50 INJECTION, SOLUTION INTRAVENOUS
Status: DISCONTINUED | OUTPATIENT
Start: 2019-06-04 | End: 2019-06-04 | Stop reason: HOSPADM

## 2019-06-04 RX ORDER — SODIUM CHLORIDE 9 MG/ML
INJECTION, SOLUTION INTRAVENOUS CONTINUOUS
Status: DISCONTINUED | OUTPATIENT
Start: 2019-06-04 | End: 2019-06-04

## 2019-06-04 RX ORDER — HYDROCODONE BITARTRATE AND ACETAMINOPHEN 5; 325 MG/1; MG/1
1 TABLET ORAL AS NEEDED
Status: DISCONTINUED | OUTPATIENT
Start: 2019-06-04 | End: 2019-06-04 | Stop reason: HOSPADM

## 2019-06-04 RX ORDER — POLYETHYLENE GLYCOL 3350 17 G/17G
17 POWDER, FOR SOLUTION ORAL DAILY PRN
Status: DISCONTINUED | OUTPATIENT
Start: 2019-06-04 | End: 2019-06-08

## 2019-06-04 RX ORDER — METOCLOPRAMIDE HYDROCHLORIDE 5 MG/ML
10 INJECTION INTRAMUSCULAR; INTRAVENOUS EVERY 8 HOURS PRN
Status: DISCONTINUED | OUTPATIENT
Start: 2019-06-04 | End: 2019-06-08

## 2019-06-04 RX ORDER — SODIUM PHOSPHATE, DIBASIC AND SODIUM PHOSPHATE, MONOBASIC 7; 19 G/133ML; G/133ML
1 ENEMA RECTAL ONCE AS NEEDED
Status: DISCONTINUED | OUTPATIENT
Start: 2019-06-04 | End: 2019-06-08

## 2019-06-04 RX ORDER — LABETALOL HYDROCHLORIDE 5 MG/ML
5 INJECTION, SOLUTION INTRAVENOUS EVERY 5 MIN PRN
Status: DISCONTINUED | OUTPATIENT
Start: 2019-06-04 | End: 2019-06-04 | Stop reason: HOSPADM

## 2019-06-04 RX ORDER — CEFAZOLIN SODIUM/WATER 2 G/20 ML
2 SYRINGE (ML) INTRAVENOUS ONCE
Status: DISCONTINUED | OUTPATIENT
Start: 2019-06-04 | End: 2019-06-04 | Stop reason: HOSPADM

## 2019-06-04 RX ORDER — SODIUM CHLORIDE 9 MG/ML
INJECTION, SOLUTION INTRAVENOUS CONTINUOUS
Status: DISCONTINUED | OUTPATIENT
Start: 2019-06-04 | End: 2019-06-08

## 2019-06-04 RX ORDER — VANCOMYCIN HYDROCHLORIDE 1 G/20ML
INJECTION, POWDER, LYOPHILIZED, FOR SOLUTION INTRAVENOUS
Status: DISCONTINUED | OUTPATIENT
Start: 2019-06-04 | End: 2019-06-04

## 2019-06-04 RX ORDER — LEVOTHYROXINE SODIUM 88 UG/1
88 TABLET ORAL
Status: DISCONTINUED | OUTPATIENT
Start: 2019-06-05 | End: 2019-06-08

## 2019-06-04 RX ORDER — SODIUM CHLORIDE, SODIUM LACTATE, POTASSIUM CHLORIDE, CALCIUM CHLORIDE 600; 310; 30; 20 MG/100ML; MG/100ML; MG/100ML; MG/100ML
INJECTION, SOLUTION INTRAVENOUS CONTINUOUS
Status: DISCONTINUED | OUTPATIENT
Start: 2019-06-04 | End: 2019-06-04 | Stop reason: HOSPADM

## 2019-06-04 RX ORDER — METOCLOPRAMIDE HYDROCHLORIDE 5 MG/ML
10 INJECTION INTRAMUSCULAR; INTRAVENOUS AS NEEDED
Status: DISCONTINUED | OUTPATIENT
Start: 2019-06-04 | End: 2019-06-04 | Stop reason: HOSPADM

## 2019-06-04 RX ORDER — HYDROCODONE BITARTRATE AND ACETAMINOPHEN 10; 325 MG/1; MG/1
1 TABLET ORAL EVERY 4 HOURS PRN
Status: DISCONTINUED | OUTPATIENT
Start: 2019-06-04 | End: 2019-06-04

## 2019-06-04 RX ORDER — DEXTROSE MONOHYDRATE 25 G/50ML
50 INJECTION, SOLUTION INTRAVENOUS
Status: DISCONTINUED | OUTPATIENT
Start: 2019-06-04 | End: 2019-06-08

## 2019-06-04 RX ORDER — ONDANSETRON 2 MG/ML
4 INJECTION INTRAMUSCULAR; INTRAVENOUS EVERY 6 HOURS PRN
Status: DISCONTINUED | OUTPATIENT
Start: 2019-06-04 | End: 2019-06-08

## 2019-06-04 NOTE — OPERATIVE REPORT
[facility]  Operative Report    Nigel Salcedo Location: OR   [facility] [id number] MRN [id number]   Admission Date 6/4/2019 Operation Date 6/4/2019   Attending Physician Michoacano Benavidez MD Operating Physician Marquis Del Rio MD     Pre-Operative Diagnosis: Pi procedure. The hemitransfixion incision was closed with a 4-0 chromic in an interrupted fashion. The inferior turbinates were outfractured laterally. The middle turbinates were lateralized as well.  The inferior portion of the superior turbinates were kevin

## 2019-06-04 NOTE — H&P
History & Physical Examination    Patient Name: Peggy Colunga  MRN: [id number]  CSN: [id number]  YOB: 1970    Diagnosis: Nonsecreting pituitary macroadenoma with hypopituitarism.     Present Illness: Admitted today for transnasal endoscopic rese (three) times daily as needed for Muscle spasms. At Bedtime Disp: 90 tablet Rfl: 2    aspirin 81 MG Oral Tab Take 1 tablet (81 mg total) by mouth daily.  Disp: 90 tablet Rfl: 1 5/21/2019   Fluticasone Propionate 50 MCG/ACT Nasal Suspension 2 sprays by Each essential hypertension      Past Surgical History:   Procedure Laterality Date   •      • CHOLECYSTECTOMY  2017   • D & C     • ENDOMETRIAL ABLATION     • ENDOMETRIAL ABLATION  2012   • LAPAROSCOPIC CHOLECYSTECTOMY N/A 2017    Perfor

## 2019-06-04 NOTE — ANESTHESIA PREPROCEDURE EVALUATION
PRE-OP EVALUATION    Patient Name: Haydee Adames    Pre-op Diagnosis: Pituitary adenoma ([facility] [address] [D35.2]    Procedure(s):  TRANSSPHENOIDAL HYPOPHYSECTOMY; SEPTOPLASTY; STEALTH NEURONAVIGATION        Surgeon(s) and Role:  Panel 1:     * Barby Odonnell MD - Acetaminophen (ACETAMINOPHEN EXTRA STRENGTH) 500 MG Oral Cap Take 1,000 mg by mouth one time. Disp:  Rfl:    glipiZIDE 10 MG Oral Tab Take 1 tablet (10 mg total) by mouth daily.  Every am before breakfast Disp: 90 tablet Rfl: 0   ALPRAZolam 0.5 MG Oral Tab • ENDOMETRIAL ABLATION  11/29/2012   • LAPAROSCOPIC CHOLECYSTECTOMY N/A 5/23/2017    Performed by Pierre Johnson MD at [facility] MAIN OR   • [address] Bilateral 11/7/2018    Performed by Ammy Morris MD at [facility] MAIN OR     Social History

## 2019-06-04 NOTE — ANESTHESIA POSTPROCEDURE EVALUATION
[address] [place] [address] Patient Status:  Surgery Admit - Inpt   Age/Gender 44year old female MRN [id number]   Location [facility] Attending Matias Parrish MD   Hosp Day # 0 PCP DO Ofe HARTMAN

## 2019-06-05 ENCOUNTER — APPOINTMENT (OUTPATIENT)
Dept: CT IMAGING | Facility: HOSPITAL | Age: 49
DRG: 614 | End: 2019-06-05
Attending: NEUROLOGICAL SURGERY
Payer: COMMERCIAL

## 2019-06-05 PROCEDURE — 99253 IP/OBS CNSLTJ NEW/EST LOW 45: CPT | Performed by: HOSPITALIST

## 2019-06-05 PROCEDURE — 70450 CT HEAD/BRAIN W/O DYE: CPT | Performed by: NEUROLOGICAL SURGERY

## 2019-06-05 PROCEDURE — 99291 CRITICAL CARE FIRST HOUR: CPT | Performed by: INTERNAL MEDICINE

## 2019-06-05 RX ORDER — HYDROCORTISONE 10 MG/1
10 TABLET ORAL DAILY
Status: DISCONTINUED | OUTPATIENT
Start: 2019-06-06 | End: 2019-06-07

## 2019-06-05 RX ORDER — DESMOPRESSIN ACETATE 4 UG/ML
1 INJECTION, SOLUTION INTRAVENOUS; SUBCUTANEOUS ONCE
Status: COMPLETED | OUTPATIENT
Start: 2019-06-05 | End: 2019-06-05

## 2019-06-05 RX ORDER — ALPRAZOLAM 0.5 MG/1
0.5 TABLET ORAL 3 TIMES DAILY PRN
Status: DISCONTINUED | OUTPATIENT
Start: 2019-06-05 | End: 2019-06-08

## 2019-06-05 RX ORDER — HYDROCORTISONE 10 MG/1
5 TABLET ORAL
Status: DISCONTINUED | OUTPATIENT
Start: 2019-06-06 | End: 2019-06-07

## 2019-06-05 NOTE — PROGRESS NOTES
[facility]  Neurocritical Care APRN Progress Note    NAME: Gosia Gandara - ROOM: [facility]/6535-H - MRN: [id number] - Age: 44year old - :1/3/1970    History Of Present Illness:  Gosia Gandara is a 44year old female with PMHx significant headache \"pressure\" to frontal/surgical region--improves with Tylenol. She denies any other pain at this time.   Also no visual disturbance, no current numbness or tingling to extremities/face, no metal/salty taste in mouth--though does feel drainage in HPI.    OBJECTIVE  Vitals:  /82   Pulse 85   Temp 98.7 °F (37.1 °C) (Temporal)   Resp 16   Ht 165.1 cm (5' 5\")   Wt 250 lb (113.4 kg)   SpO2 93%   BMI 41.60 kg/m²    Physical Exam:    General Appearance: Alert, cooperative, no acute distress, appear and Dr Olivia Fisher (ENT). 2.  Cervical stenosis C 5-6 with myelopathy and C6 radiculopathy--followed by NS and pain service as outpatient  3.   L2-L3 severe spondylosis with chronic back pain and L2 radiculopathy--followed by NS and pain service as outpatient intervention, complex decision making, and/or extensive discussions with the patient, family, and clinical staff.

## 2019-06-05 NOTE — CONSULTS
[facility]  [address]oe [address] Patient Status:  Inpatient    1/3/1970 MRN [id number]   [facility] 6NE-A Attending Rudy Moise MD   Hosp Day # 1 [address],      Reason for consult: medical management drink alcohol or use drugs.     Family History:   Family History   Problem Relation Age of Onset   • Hypertension Father    • Diabetes Father    • Hypertension Mother    • Diabetes Mother    • Cancer Mother    • Breast Cancer Maternal Cousin Female        A Disp: 60 tablet Rfl: 1   triamcinolone acetonide 0.1 % External Cream Apply topically 2 (two) times daily as needed. Disp: 60 g Rfl: 1   HYDROcodone-acetaminophen  MG Oral Tab Take 1 tablet by mouth every 8 (eight) hours as needed for Pain.  Disp: 60 disease  3. Hypertension: cont. Home regimen  4. Hypothyroidism  5. Type 2 diabetes: endo to see. Insulin protocol  6. Anxiety/depression: cont home meds  7. Gastric reflux: PPI  8.  Reactive leukocytosis: monitor      Quality:  · DVT Prophylaxis: SCD  · CO

## 2019-06-05 NOTE — CONSULTS
[facility]  Report of Consultation    Kuldeep Wilks Patient Status:  Inpatient    1/3/1970 MRN [id number]   [facility] 6NE-A Attending Salty Gordon MD   Hosp Day # 1 PCP LUZ ZAMORA, DO     Reason for Consultation:  Post TS metformin 1000mg po bid. Complicated by microalbuminuria. No known retinopathy or neuropathy but limited follow up. Overall patient is feeling well. Review of systems:   A comprehensive 12 point review of systems was completed.   Pertinent positive 0-50 Units, 0-50 Units, Subcutaneous, TID CC and HS  •  hydrocortisone Na succinate PF (SOLU-cortef) injection 25 mg, 25 mg, Intravenous, Q12H  •  Levothyroxine Sodium (SYNTHROID, LEVOTHROID) tab 88 mcg, 88 mcg, Oral, Before breakfast  •  glucose (DEX4) or 41.68 kg/m²   Body mass index is 41.68 kg/m². General:  No acute distress, well groomed   Eyes: anicteric sclera, perrla, eomi  ENT: bandage and packaging in place.    Neck: supple, no thyromegaly or nodules palpable  Lymph: no neck or supraclavicular lymp nonfunctioning.   -follows with Dr. Arcos Points as outpatient  -monitor NA for SIADH/DI.   -please page if uop >300ml/hr for 2 hours or greater than 500ml/hr x1 hour  -pathology pending.   -advised patient to drink to thirst.   -repeat stat labs now, please pag

## 2019-06-05 NOTE — PLAN OF CARE
Assumed care of patient at [address]. Patient drowsy, but easy to arouse, follows commands, and LINDSAY. Patient denies SOB, dizziness, or nausea. Patient c/o \"sinus pressure\". Pain meds given as needed. Dr. Turner Getting endocrine notified of new consult.  Mehul Funk Neuro A

## 2019-06-05 NOTE — CONSULTS
[facility]  Neurocritical Care Consult Note    Peggy Colunga Patient Status:  Inpatient    1/3/1970 MRN [id number]   [facility] 6NE-A Attending Ruslan Garcia, MD   Hosp Day # 1 PCP LUZ ZAMORA, DO       Reason Levothyroxine Sodium (SYNTHROID) 88 MCG Oral Tab Take 1 tablet (88 mcg total) by mouth before breakfast. Disp: 90 tablet Rfl: 0 6/4/2019 at 0900   Losartan Potassium-HCTZ 100-25 MG Oral Tab Take 1 tablet by mouth daily.  Disp: 90 tablet Rfl: 3 6/4/2019 at Never Used      Tobacco comment: QUIT ABOUT 15 YEARS AGO PER PT REPORT    Substance and Sexual Activity      Alcohol use: Never        Frequency: Never      Drug use: Never      Sexual activity: Never    Other Topics      Concerns:        Caffeine Concern: injection 4 mg 4 mg Intravenous Q6H PRN   Metoclopramide HCl (REGLAN) injection 10 mg 10 mg Intravenous Q8H PRN   atorvastatin (LIPITOR) tab 20 mg 20 mg Oral Nightly   losartan (COZAAR) 100 mg, hydrochlorothiazide (HYDRODIURIL) 25 mg for Hyzaar 100/25 (EEH * 224*  --    BUN 12 12  --    CREATSERUM 0.97 0.87  --      Recent Labs   Lab 06/05/19  0415   WBC 18.3*   HGB 12.5   .0       Assesment/Plan:     Neuro:  s/p elective transphenoidal resection 6/4- cont neurochecks and monitor UOP for conc

## 2019-06-05 NOTE — OPERATIVE REPORT
[address] [facility] [facility] REPORT    Patient:  Kuldeep Wilks;  YOB: 1970     CSN:  [id number]; Medical Record Number:  [id number]    Admission Date:  6/4/2019 Operation Date:  6/4/2019    . ..........................     Operating Co-Surgeons: using the electromagnetic antenna and surface racking technique.   Navigation was utilized in confirming visualization of midline Vomer, appropriate bony septations within the sphenoid sinus, sella floor, carotid and optic nerves, and once intradural, the b seen in the back of the exposure and confirmed with navigation. The cavernous sinus was then controlled for bleeding with FloSeal.  Although there may be some microscopic tumor remaining it appeared the near gross total resection was accomplished.   The ar

## 2019-06-05 NOTE — PROGRESS NOTES
[facility]  Neurosurgery Progress Note    Rashid Porter Patient Status:  Inpatient    1/3/1970 MRN [id number]   [facility] 6NE-A Attending Lizeth Scruggs MD   Hosp Day # 1 PCP Nickolas Lazcano DO     Chief Complaint:  Nonsecretin [address] [facility]  6/5/2019, 8:05 AM

## 2019-06-06 PROCEDURE — 99232 SBSQ HOSP IP/OBS MODERATE 35: CPT | Performed by: HOSPITALIST

## 2019-06-06 RX ORDER — BLOOD-GLUCOSE METER
1 EACH MISCELLANEOUS ONCE
Qty: 1 KIT | Refills: 0 | Status: SHIPPED | OUTPATIENT
Start: 2019-06-06 | End: 2019-06-06

## 2019-06-06 RX ORDER — HYDROCODONE BITARTRATE AND ACETAMINOPHEN 5; 325 MG/1; MG/1
1 TABLET ORAL EVERY 4 HOURS PRN
Status: DISCONTINUED | OUTPATIENT
Start: 2019-06-06 | End: 2019-06-07

## 2019-06-06 RX ORDER — HYDROCODONE BITARTRATE AND ACETAMINOPHEN 5; 325 MG/1; MG/1
2 TABLET ORAL EVERY 4 HOURS PRN
Status: DISCONTINUED | OUTPATIENT
Start: 2019-06-06 | End: 2019-06-07

## 2019-06-06 RX ORDER — LANCETS 33 GAUGE
1 EACH MISCELLANEOUS
Qty: 100 EACH | Refills: 0 | Status: SHIPPED | OUTPATIENT
Start: 2019-06-06

## 2019-06-06 RX ORDER — BLOOD SUGAR DIAGNOSTIC
STRIP MISCELLANEOUS
Qty: 100 STRIP | Refills: 0 | Status: SHIPPED | OUTPATIENT
Start: 2019-06-06

## 2019-06-06 RX ORDER — HEPARIN SODIUM 5000 [USP'U]/ML
5000 INJECTION, SOLUTION INTRAVENOUS; SUBCUTANEOUS EVERY 8 HOURS SCHEDULED
Status: DISCONTINUED | OUTPATIENT
Start: 2019-06-06 | End: 2019-06-08

## 2019-06-06 NOTE — PROGRESS NOTES
[facility]  Neurosurgery Progress Note    Sofie Ames Patient Status:  Inpatient    1/3/1970 MRN [id number]   [facility] 6NE-A Attending Sancho Sebastian MD   Hosp Day # 2 PCP Leon Ansari DO     Chief Complaint:  Pituitary m mass is noted. Associated marked reduction in size of pituitary gland and diaphragma sella no longer demonstrates superior bowing. Assessment: This is a 44year old female s/p TTS for pituitary macroadenoma resection POD #2    Plan:   Will discuss with

## 2019-06-06 NOTE — PROGRESS NOTES
[facility]  Progress Note    Elma Yadav Patient Status:  Inpatient    1/3/1970 MRN [id number]   [facility] 6NE-A Attending Obinna Pelletier MD   Hosp Day # 2 PCP LUZ ZAMORA, DO       SUBJECTIVE:  Continued increased uop ove flextouch 15 Units 15 Units Subcutaneous Daily   hydrocortisone (CORTEF) tab 10 mg 10 mg Oral Daily   hydrocortisone (CORTEF) tab 5 mg 5 mg Oral Noon   0.9%  NaCl infusion  Intravenous Continuous   Insulin Aspart Pen (NOVOLOG) 100 UNIT/ML flexpen 0-50 Unit s/p TSS 6/4/19 with Dr. Sherman Campbell.   -preop evaluation was not completed prior to surgery, but appears to be nonfunctioning.   -follows with Dr. Joycelyn Preston as outpatient  -monitor NA for SIADH/DI with close monitoring of I/o  -increased uop with dilute urine, rece

## 2019-06-06 NOTE — PLAN OF CARE
Received patient at 0730 awake and alert. Neuro exam intact. Complaints of headache fullness in head medicated with tylenol. Changed mustache dressing. Discontinue sy at 08:00. Discussed plan of care with patient.    Problem: Patient/Family Goals  Go increase activity and self care with guidance from PT/OT  - Encourage visually impaired, hearing impaired and aphasic patients to use assistive/communication devices  Outcome: Progressing     Problem: PAIN - ADULT  Goal: Verbalizes/displays adequate comfor

## 2019-06-06 NOTE — PLAN OF CARE
Assumed care of this patient at [address] [place] [address]. A&O x 4, in no distress. Remains neurologically intact, however, states she feels tired. Serum/urine osmolality and Na monitored. DDAVP x 1 given per Endo. Total urine output overnight 755 ml.   PRN Norco for compl

## 2019-06-06 NOTE — PROGRESS NOTES
[facility]  Progress Note     Teo Vargas Patient Status:  Inpatient    1/3/1970 MRN [id number]   [facility] 6NE-A Attending Jewel Em MD   Hosp Day # 2 [address],      Chief Complaint: s/p pituitary adenoma input(s): TROP, CK in the last 168 hours. Imaging: Imaging data reviewed in Epic.     Medications:   • insulin detemir  15 Units Subcutaneous Daily   • hydrocortisone  10 mg Oral Daily   • hydrocortisone  5 mg Oral Noon   • Insulin Aspart Pen  0-50

## 2019-06-06 NOTE — PLAN OF CARE
Alert + oriented x 4. VS stable. BS present. LS CTA. Mustached dressing intact with scant serosenguinous drainage. Patient was sitting in chair and started to appear sweaty and c/o light headedness. Moved her to the bed.  Checked her blood sugar which was o

## 2019-06-07 PROCEDURE — 99232 SBSQ HOSP IP/OBS MODERATE 35: CPT | Performed by: HOSPITALIST

## 2019-06-07 RX ORDER — HYDROCORTISONE 10 MG/1
10 TABLET ORAL ONCE
Status: COMPLETED | OUTPATIENT
Start: 2019-06-07 | End: 2019-06-07

## 2019-06-07 RX ORDER — HYDROCORTISONE 10 MG/1
10 TABLET ORAL
Status: DISCONTINUED | OUTPATIENT
Start: 2019-06-07 | End: 2019-06-08

## 2019-06-07 RX ORDER — HYDROCORTISONE 10 MG/1
10 TABLET ORAL
Status: DISCONTINUED | OUTPATIENT
Start: 2019-06-07 | End: 2019-06-07

## 2019-06-07 RX ORDER — HYDROCORTISONE 10 MG/1
20 TABLET ORAL
Status: DISCONTINUED | OUTPATIENT
Start: 2019-06-08 | End: 2019-06-08

## 2019-06-07 RX ORDER — ACETAMINOPHEN AND CODEINE PHOSPHATE 300; 30 MG/1; MG/1
2 TABLET ORAL EVERY 4 HOURS PRN
Status: DISCONTINUED | OUTPATIENT
Start: 2019-06-07 | End: 2019-06-08

## 2019-06-07 RX ORDER — HYDROCODONE BITARTRATE AND ACETAMINOPHEN 5; 325 MG/1; MG/1
1-2 TABLET ORAL EVERY 6 HOURS PRN
Qty: 60 TABLET | Refills: 0 | Status: SHIPPED | OUTPATIENT
Start: 2019-06-07 | End: 2019-08-14

## 2019-06-07 NOTE — PLAN OF CARE
Assumed care. Neuro stable, unchanged. Left slightly weaker. Mustache dressing in place. Some old serosanguinous drainage on dressing. Still with HA that increases as norco wears off. States it feels like a lot of pressure.  Mahesh Vidal does help relieve the pain

## 2019-06-07 NOTE — PROGRESS NOTES
[facility]  Progress Note     Derekxochilt German Patient Status:  Inpatient    1/3/1970 MRN [id number]   [facility] 7NE-A Attending Lizeth Scruggs MD   Hosp Day # 3 [address],      Chief Complaint: s/p elective pituitar results for input(s): TROP, CK in the last 168 hours. Imaging: Imaging data reviewed in Epic.     Medications:   • hydrocortisone  10 mg Oral Daily with dinner   • [START ON 6/8/2019] hydrocortisone  20 mg Oral Daily with breakfast   • Heparin Sodiu

## 2019-06-07 NOTE — PLAN OF CARE
Assumed care at 0700. No acute issues today. Prn Norco for pain. Still w/ increased head pressure/HA, new order for tylenol 3. Neurologically intact. Slight left arm weakness. Nasal pad removed. Pt ok to irrigate w/ saline per ENT.  Patient has dissol pt to monitor pain and request assistance  - Assess pain using appropriate pain scale  - Administer analgesics based on type and severity of pain and evaluate response  - Implement non-pharmacological measures as appropriate and evaluate response  - Consid

## 2019-06-07 NOTE — OCCUPATIONAL THERAPY NOTE
OCCUPATIONAL THERAPY QUICK EVALUATION - INPATIENT    Room Number: 3236/2882-N  Evaluation Date: 6/7/2019     Type of Evaluation: Quick Eval  Presenting Problem: pituitary adenoma surgery 6/4    Physician Order: IP Consult to Occupational Therapy  Reason fo BILAT SINUS ENDOSCOPY ETHM MAX N/A 6/4/2019    Performed by Niko Cox MD at [address]   • [address] [facility] ENT N/A 6/4/2019    Performed by Niko Cox MD at [address] [facility] OR   • TRANS SPENOIDAL  TRANSNASAL HYPOPHYSECTOMY N/A 6/4/2019    Performed by OF DAILY LIVING ASSESSMENT  AM-PAC ‘6-Clicks’ Inpatient Daily Activity Short Form   How much help from another person does the patient currently need…  -   Putting on and taking off regular lower body clothing?: A Little   -   Bathing (including washing, r No comorbidities nor modifications of tasks    Clinical Decision Making  LOW - Analysis of occupational profile, problem-focused assessments, limited treatment options    Overall Complexity  LOW     OT Discharge Recommendations: Home       PLAN   Patient h

## 2019-06-07 NOTE — PROGRESS NOTES
[facility]  Neurosurgery Progress Note    Harman Dee Patient Status:  Inpatient    1/3/1970 MRN [id number]   [facility] 7NE-A Attending Luther Irizarry MD   Hosp Day # 3 PCP Kiera Hinton DO     Chief Complaint:  Pituitary m

## 2019-06-07 NOTE — PHYSICAL THERAPY NOTE
PHYSICAL THERAPY QUICK EVALUATION - INPATIENT    Room Number: 1659/2093-N  Evaluation Date: 6/7/2019  Presenting Problem: pituitary tumor removal  Physician Order: PT Eval and Treat    History related to current admission: Pt is s/p TTS and pituitary taurus Level of [place]: Pt lives with spouse and son. She is ambulating independently without AD within home, using SC for community ambulation, Spouse assists pt as needed for ADL/IADL.      SUBJECTIVE  \"I am feeling worse today but want to work with you\" stair negotiation with handrail at supervision level, cues for sequencing. Pt reporting pain/pressure in head and sinus with activity. Pt returned to bed independently. Updated pt on role of PT, POC, DC planning/recs, positioning.,activity.        Patient E

## 2019-06-07 NOTE — PROGRESS NOTES
ENDOCRINOLOGY PROGRESS NOTE    Typed by Lo Gates MD on 6/7/2019      S:  Pt says she is not feeling so well today. Felt she was better yesterday but today, noticing more fatigue and headaches.   at bedside      O:  /71   Pulse 9 112 mmol/L 105 105 105 103   Carbon Dioxide, Total      21.0 - 32.0 mmol/L 29.0 28.0 25.0 25.0   ANION GAP      0 - 18 mmol/L 2 5 7 9   BUN      7 - 18 mg/dL 17 15 12 12   CREATININE      0.55 - 1.02 mg/dL 0.65 0.79 0.87 0.97   BUN/CREAT Ratio      10.0 - with lunch - today pt is feeling fatigued  · Increase hydrocortisone to 20 mg with breakfast and 10 mg with dinner. · Today, pt received 10 mg with breakfast. We will give 10 mg with lunch AND dinner today - total dose 30 mg today.   · On levothyroxine 88

## 2019-06-08 VITALS
TEMPERATURE: 98 F | OXYGEN SATURATION: 100 % | HEART RATE: 90 BPM | DIASTOLIC BLOOD PRESSURE: 80 MMHG | BODY MASS INDEX: 41.62 KG/M2 | WEIGHT: 249.81 LBS | HEIGHT: 65 IN | SYSTOLIC BLOOD PRESSURE: 141 MMHG | RESPIRATION RATE: 17 BRPM

## 2019-06-08 PROCEDURE — 99233 SBSQ HOSP IP/OBS HIGH 50: CPT | Performed by: HOSPITALIST

## 2019-06-08 RX ORDER — HYDROCORTISONE 10 MG/1
TABLET ORAL
Qty: 90 TABLET | Refills: 1 | Status: SHIPPED | OUTPATIENT
Start: 2019-06-08 | End: 2019-09-11

## 2019-06-08 NOTE — PROGRESS NOTES
[facility]  Neurosurgery Progress Note  2019    Haydee Adames Patient Status:  Inpatient    1/3/1970 MRN [id number]   [facility] 7NE-A Attending Barby Odonnell MD   Hosp Day # 4 PCP Verlene Osgood, DO     SUBJECTIVE:  Joy Stewart

## 2019-06-08 NOTE — PLAN OF CARE
Assumed care @ 0700, patient alert oriented x4. Medicated for  pain. Positioned in bed comfortably. Updated patient regarding goals of care. Possible discharge today. Reinforced learning regarding fluid restriction. Blood sugar monitored.   Vitals si unsuccessful or patient reports new pain  - Anticipate increased pain with activity and pre-medicate as appropriate  Outcome: Progressing     Problem: Impaired Functional Mobility  Goal: Achieve highest/safest level of mobility/gait  Ziyad Avendano

## 2019-06-08 NOTE — PROGRESS NOTES
ENDOCRINOLOGY PROGRESS NOTE    Typed by Christian Mccann MD on 6/8/2019      S: Pt eager to go home today and is ready to go. Feeling better today since steroids were increased.       O:  /73 (BP Location: Right arm)   Pulse 82   Temp 97.7 °F 105 105 105 103   Carbon Dioxide, Total      21.0 - 32.0 mmol/L 29.0 28.0 25.0 25.0   ANION GAP      0 - 18 mmol/L 2 5 7 9   BUN      7 - 18 mg/dL 17 15 12 12   CREATININE      0.55 - 1.02 mg/dL 0.65 0.79 0.87 0.97   BUN/CREAT Ratio      10.0 - 20.0 26.2 ( daily  · Pt sees me for pituitary       7. Pt can be discharged from endocrine standpoint.  I recommend the following:      Continue metformin, glipizide and levothyroxine as you are doing    Hydrocortisone (steroid medication) 10 mg - take 2 tablets wit

## 2019-06-08 NOTE — PROGRESS NOTES
[facility]  Progress Note     Gema Phipps Patient Status:  Inpatient    1/3/1970 MRN [id number]   [facility] 7NE-A Attending Mathew Pedraza MD   Hosp Day # 4 PCP Jenna Robles DO     Chief Complaint: elective Towner Sin hours. No results for input(s): TROP, CK in the last 168 hours. Imaging: Imaging data reviewed in Epic.     Medications:   • hydrocortisone  10 mg Oral Daily with dinner   • hydrocortisone  20 mg Oral Daily with breakfast   • Heparin Sodium ([place]

## 2019-06-08 NOTE — PLAN OF CARE
NURSING DISCHARGE NOTE    Discharged Home via Wheelchair. Accompanied by Family member  Belongings Taken by patient/family.         Patient educated on medications, wound care, s/s to monitor for, follow up appointments, and when to call [address] care  Description  INTERVENTIONS  - Monitor swallowing and airway patency with patient fatigue and changes in neurological status  - Encourage and assist patient to increase activity and self care with guidance from PT/OT  - Encourage visually impaired, he

## 2019-06-10 ENCOUNTER — PATIENT OUTREACH (OUTPATIENT)
Dept: CASE MANAGEMENT | Age: 49
End: 2019-06-10

## 2019-06-10 DIAGNOSIS — E03.9 ACQUIRED HYPOTHYROIDISM: ICD-10-CM

## 2019-06-10 DIAGNOSIS — E66.9 DIABETES MELLITUS TYPE 2 IN OBESE (HCC): ICD-10-CM

## 2019-06-10 DIAGNOSIS — D35.2 PITUITARY ADENOMA (HCC): ICD-10-CM

## 2019-06-10 DIAGNOSIS — E11.69 DIABETES MELLITUS TYPE 2 IN OBESE (HCC): ICD-10-CM

## 2019-06-10 DIAGNOSIS — E89.3 S/P TRANSSPHENOIDAL HYPOPHYSECTOMY (HCC): ICD-10-CM

## 2019-06-10 DIAGNOSIS — Z02.9 ENCOUNTERS FOR UNSPECIFIED ADMINISTRATIVE PURPOSE: ICD-10-CM

## 2019-06-10 PROCEDURE — 1111F DSCHRG MED/CURRENT MED MERGE: CPT

## 2019-06-10 NOTE — DISCHARGE SUMMARY
[facility]  Discharge Summary    Jamey Dunbarwright Patient Status:  Inpatient    1/3/1970 MRN [id number]   [facility] 7NE-A Attending No att. providers found   Hosp Day # 4 PCP LUZ ZAMORA DO     Date of Admission: 2019    Date She developed post op DI. Endocrinology was consulted. She was treated with DDAVP and had improvement. She was kept in the ICU until POD #2 for close I&O monitoring. She transferred to the floor on POD #2. She had controlled pain.   She discharged home total) by mouth daily. , Normal, Disp-90 tablet, R-3    atorvastatin 20 MG Oral Tab  Take 1 tablet (20 mg total) by mouth nightly., Normal, Disp-90 tablet, R-3Dose change 6/19/2018    Clobetasol Propionate 0.05 % External Ointment  Apply 1 Application topic

## 2019-06-10 NOTE — PROGRESS NOTES
Initial Post Discharge Follow Up   Discharge Date: 6/8/19  Contact Date: 6/10/2019    Consent Verification:  Assessment Completed With: Patient  HIPAA Verified?   Yes    Discharge Dx:    Pituitary adenoma, S/P transnasal endoscopic resection of pituitary you?   o On a scale of 1-5   1- Very Poor and 5- Very well   o Very well  • Do you have any questions about your discharge instructions? No  • Before leaving the hospital was your diagnoses explained to you?  Yes  • Do you have any questions about your diag 90 tablet Rfl: 0   Losartan Potassium-HCTZ 100-25 MG Oral Tab Take 1 tablet by mouth daily. Disp: 90 tablet Rfl: 3   Metoprolol Succinate ER 50 MG Oral Tablet 24 Hr Take 1 tablet (50 mg total) by mouth daily.  Disp: 90 tablet Rfl: 3   atorvastatin 20 MG Ora at [address] [facility] [address], ENT - [address]   [place] [address]

## 2019-06-11 ENCOUNTER — OFFICE VISIT (OUTPATIENT)
Dept: FAMILY MEDICINE CLINIC | Facility: CLINIC | Age: 49
End: 2019-06-11

## 2019-06-11 VITALS
HEART RATE: 100 BPM | DIASTOLIC BLOOD PRESSURE: 80 MMHG | WEIGHT: 248 LBS | OXYGEN SATURATION: 98 % | HEIGHT: 65 IN | TEMPERATURE: 98 F | RESPIRATION RATE: 16 BRPM | BODY MASS INDEX: 41.32 KG/M2 | SYSTOLIC BLOOD PRESSURE: 126 MMHG

## 2019-06-11 DIAGNOSIS — D35.2 PITUITARY MACROADENOMA WITH EXTRASELLAR EXTENSION (HCC): Primary | ICD-10-CM

## 2019-06-11 DIAGNOSIS — F41.1 GENERALIZED ANXIETY DISORDER: ICD-10-CM

## 2019-06-11 DIAGNOSIS — E11.65 UNCONTROLLED TYPE 2 DIABETES MELLITUS WITH MICROALBUMINURIA, WITHOUT LONG-TERM CURRENT USE OF INSULIN (HCC): ICD-10-CM

## 2019-06-11 DIAGNOSIS — E11.29 UNCONTROLLED TYPE 2 DIABETES MELLITUS WITH MICROALBUMINURIA, WITHOUT LONG-TERM CURRENT USE OF INSULIN (HCC): ICD-10-CM

## 2019-06-11 DIAGNOSIS — R80.9 UNCONTROLLED TYPE 2 DIABETES MELLITUS WITH MICROALBUMINURIA, WITHOUT LONG-TERM CURRENT USE OF INSULIN (HCC): ICD-10-CM

## 2019-06-11 PROCEDURE — 1111F DSCHRG MED/CURRENT MED MERGE: CPT | Performed by: FAMILY MEDICINE

## 2019-06-11 PROCEDURE — 99496 TRANSJ CARE MGMT HIGH F2F 7D: CPT | Performed by: FAMILY MEDICINE

## 2019-06-11 RX ORDER — ALPRAZOLAM 0.5 MG/1
0.5 TABLET ORAL 3 TIMES DAILY PRN
Qty: 60 TABLET | Refills: 2 | Status: SHIPPED | OUTPATIENT
Start: 2019-06-11 | End: 2019-08-14

## 2019-06-11 RX ORDER — GLIPIZIDE 10 MG/1
10 TABLET ORAL DAILY
Qty: 90 TABLET | Refills: 0 | Status: SHIPPED | OUTPATIENT
Start: 2019-06-11 | End: 2020-03-19

## 2019-06-11 NOTE — PROGRESS NOTES
HPI:    Rashid Porter is a 44year old female here today for hospital follow up.    She was discharged from Inpatient hospital, [facility] to Home   Admission Date: 6/4/19   Discharge Date: 6/8/19  Hospital Discharge Diagnoses (since 5/12/2019)   Monalisa Machuca MG Oral Tab Take 2 tablets with breakfast and 1 tablet with dinner   HYDROcodone-acetaminophen 5-325 MG Oral Tab Take 1-2 tablets by mouth every 6 (six) hours as needed.    Glucose Blood (ONETOUCH VERIO) In Vitro Strip Use to check blood sugars twice a day history includes Breast Cancer in her maternal cousin female; Cancer in her mother; Diabetes in her father and mother; Hypertension in her father and mother. She  reports that she has quit smoking.  She has never used smokeless tobacco. 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

## 2019-06-18 ENCOUNTER — OFFICE VISIT (OUTPATIENT)
Dept: SURGERY | Facility: CLINIC | Age: 49
End: 2019-06-18

## 2019-06-18 VITALS — HEIGHT: 65 IN | OXYGEN SATURATION: 96 % | WEIGHT: 240 LBS | BODY MASS INDEX: 39.99 KG/M2 | HEART RATE: 82 BPM

## 2019-06-18 DIAGNOSIS — Z98.890 POST-OPERATIVE STATE: ICD-10-CM

## 2019-06-18 DIAGNOSIS — D35.2 PITUITARY ADENOMA (HCC): ICD-10-CM

## 2019-06-18 DIAGNOSIS — Z98.890 S/P SELECTIVE TRANSSPHENOIDAL PITUITARY ADENOMECTOMY: Primary | ICD-10-CM

## 2019-06-18 DIAGNOSIS — Z86.018 S/P SELECTIVE TRANSSPHENOIDAL PITUITARY ADENOMECTOMY: Primary | ICD-10-CM

## 2019-06-18 PROCEDURE — 99024 POSTOP FOLLOW-UP VISIT: CPT | Performed by: PHYSICIAN ASSISTANT

## 2019-06-18 RX ORDER — HYDROCODONE BITARTRATE AND ACETAMINOPHEN 5; 325 MG/1; MG/1
1-2 TABLET ORAL EVERY 6 HOURS PRN
Qty: 60 TABLET | Refills: 0 | Status: SHIPPED | OUTPATIENT
Start: 2019-06-18 | End: 2020-04-22 | Stop reason: ALTCHOICE

## 2019-06-18 NOTE — PROGRESS NOTES
F/U post (6/4/19)  TRANSSPHENOIDAL HYPOPHYSECTOMY; SEPTOPLASTY; STEALTH NEURONAVIGATION    And    NASAL SEPTOPLASTY BILAT SINUS ENDOSCOPY ETHM MAX      Headaches daily  Increased congestion    Last dose norco 5 yesterday, would like the 10-325mg    5/10 re

## 2019-06-18 NOTE — PROGRESS NOTES
Neurosurgery Clinic Visit  2019    Lina Lizarraga PCP:  Fernie Alvarado DO DOB 1/3/1970 MRN [id number]           REASON FOR VISIT: S/p pituitary tumor resection, 2 week post op visit      HISTORY OF PRESENT ILLNESS:Bertha Mo is a 44year old Suspension 2 sprays by Each Nare route daily. Disp: 1 Bottle Rfl: 2   metFORMIN HCl 1000 MG Oral Tab Take 1 tablet (1,000 mg total) by mouth 2 (two) times daily with meals.  Disp: 180 tablet Rfl: 0   triamcinolone acetonide 0.1 % External Cream Apply topica with a non-functioning adenoma. \"      IMAGING:  No recent imaging      ASSESSMENT and PLAN:  (Z98.890,  Z86.018) S/P selective transsphenoidal pituitary adenomectomy  (primary encounter diagnosis)    (D35.2) Pituitary adenoma ([facility] [address]    (Z98.890) Post-opera [phone]  6/18/2019 12:49 PM

## 2019-07-29 ENCOUNTER — TELEPHONE (OUTPATIENT)
Dept: SURGERY | Facility: CLINIC | Age: 49
End: 2019-07-29

## 2019-08-07 ENCOUNTER — TELEPHONE (OUTPATIENT)
Dept: SURGERY | Facility: CLINIC | Age: 49
End: 2019-08-07

## 2019-08-07 NOTE — TELEPHONE ENCOUNTER
Medication: Constantino Garrido #60    Date of last refill: 6/18/19  [place]    Last office visit: 6/18/19  Due back to clinic per last office note: Follow up August 27 at 10:45am with Dr. Terrel Nyhan after neuro conference.  Elba Raza

## 2019-08-07 NOTE — TELEPHONE ENCOUNTER
Refill norco 10s      Patient informed of 48 hour refill policy excluding weekends and holidays. Informed patient only patient can  the prescription effective April 1, 2017.   Further explained patient will not receive a call back once prescription i

## 2019-08-08 RX ORDER — HYDROCODONE BITARTRATE AND ACETAMINOPHEN 5; 325 MG/1; MG/1
1-2 TABLET ORAL EVERY 6 HOURS PRN
Qty: 60 TABLET | Refills: 0 | OUTPATIENT
Start: 2019-08-08

## 2019-08-08 NOTE — TELEPHONE ENCOUNTER
Patient request for [address] [place] [address] not approved. Patient has missed last post op appointments with Nikki Herndon PA-C. Will need to be seen for evaluation and discuss if Norco appropriate to continue to refill. Will have nursing advise.

## 2019-08-08 NOTE — TELEPHONE ENCOUNTER
Patient informed of the message below and stated she did call yesterday and was told she already has an appointment scheduled for 8/27/19. Patient wanting to know if she can get rx to get her to that appointment.  Will relay message on to providers to a

## 2019-08-08 NOTE — TELEPHONE ENCOUNTER
Patient has missed last 2 appointments. No norco refill at this time, will discuss at follow up. Okay to further discuss with Dr. mEory Sams if needed, will advise nursing.

## 2019-08-09 NOTE — TELEPHONE ENCOUNTER
Spoke with patient. Reviewed [employer] message. If patient is able to schedule the MRI before 8/13/19 she will call the office back so her neuro-onc appt can be moved to 8/13/19. No further questions at this time.

## 2019-08-11 ENCOUNTER — TELEPHONE (OUTPATIENT)
Dept: FAMILY MEDICINE CLINIC | Facility: CLINIC | Age: 49
End: 2019-08-11

## 2019-08-11 DIAGNOSIS — R80.9 UNCONTROLLED TYPE 2 DIABETES MELLITUS WITH MICROALBUMINURIA, WITHOUT LONG-TERM CURRENT USE OF INSULIN (HCC): ICD-10-CM

## 2019-08-11 DIAGNOSIS — E11.29 UNCONTROLLED TYPE 2 DIABETES MELLITUS WITH MICROALBUMINURIA, WITHOUT LONG-TERM CURRENT USE OF INSULIN (HCC): ICD-10-CM

## 2019-08-11 DIAGNOSIS — E11.65 UNCONTROLLED TYPE 2 DIABETES MELLITUS WITH MICROALBUMINURIA, WITHOUT LONG-TERM CURRENT USE OF INSULIN (HCC): ICD-10-CM

## 2019-08-12 RX ORDER — GLIPIZIDE 5 MG/1
TABLET ORAL
Qty: 90 TABLET | Refills: 0 | OUTPATIENT
Start: 2019-08-12

## 2019-08-12 RX ORDER — LEVOTHYROXINE SODIUM 88 UG/1
TABLET ORAL
Qty: 90 TABLET | Refills: 0 | Status: SHIPPED | OUTPATIENT
Start: 2019-08-12 | End: 2019-11-08

## 2019-08-13 ENCOUNTER — HOSPITAL ENCOUNTER (OUTPATIENT)
Dept: MRI IMAGING | Facility: HOSPITAL | Age: 49
Discharge: HOME | End: 2019-08-13
Attending: PHYSICIAN ASSISTANT
Payer: COMMERCIAL

## 2019-08-13 ENCOUNTER — TELEPHONE (OUTPATIENT)
Dept: NEUROLOGY | Facility: CLINIC | Age: 49
End: 2019-08-13

## 2019-08-13 DIAGNOSIS — Z98.890 POST-OPERATIVE STATE: ICD-10-CM

## 2019-08-13 DIAGNOSIS — D35.2 PITUITARY ADENOMA (HCC): ICD-10-CM

## 2019-08-13 DIAGNOSIS — Z86.018 S/P SELECTIVE TRANSSPHENOIDAL PITUITARY ADENOMECTOMY: ICD-10-CM

## 2019-08-13 DIAGNOSIS — Z98.890 S/P SELECTIVE TRANSSPHENOIDAL PITUITARY ADENOMECTOMY: ICD-10-CM

## 2019-08-13 PROCEDURE — A9575 INJ GADOTERATE MEGLUMI 0.1ML: HCPCS | Performed by: PHYSICIAN ASSISTANT

## 2019-08-13 PROCEDURE — 70553 MRI BRAIN STEM W/O & W/DYE: CPT | Performed by: PHYSICIAN ASSISTANT

## 2019-08-14 ENCOUNTER — OFFICE VISIT (OUTPATIENT)
Dept: FAMILY MEDICINE CLINIC | Facility: CLINIC | Age: 49
End: 2019-08-14
Payer: COMMERCIAL

## 2019-08-14 VITALS
HEART RATE: 96 BPM | RESPIRATION RATE: 18 BRPM | HEIGHT: 65 IN | DIASTOLIC BLOOD PRESSURE: 88 MMHG | TEMPERATURE: 98 F | SYSTOLIC BLOOD PRESSURE: 134 MMHG | WEIGHT: 260 LBS | BODY MASS INDEX: 43.32 KG/M2

## 2019-08-14 DIAGNOSIS — I10 ESSENTIAL HYPERTENSION WITH GOAL BLOOD PRESSURE LESS THAN 130/80: ICD-10-CM

## 2019-08-14 DIAGNOSIS — D35.2 PITUITARY MACROADENOMA (HCC): ICD-10-CM

## 2019-08-14 DIAGNOSIS — E78.2 MIXED HYPERLIPIDEMIA: ICD-10-CM

## 2019-08-14 DIAGNOSIS — M54.12 CERVICAL RADICULOPATHY: ICD-10-CM

## 2019-08-14 DIAGNOSIS — E11.29 UNCONTROLLED TYPE 2 DIABETES MELLITUS WITH MICROALBUMINURIA, WITHOUT LONG-TERM CURRENT USE OF INSULIN (HCC): Primary | ICD-10-CM

## 2019-08-14 DIAGNOSIS — R80.9 UNCONTROLLED TYPE 2 DIABETES MELLITUS WITH MICROALBUMINURIA, WITHOUT LONG-TERM CURRENT USE OF INSULIN (HCC): Primary | ICD-10-CM

## 2019-08-14 DIAGNOSIS — E11.65 UNCONTROLLED TYPE 2 DIABETES MELLITUS WITH MICROALBUMINURIA, WITHOUT LONG-TERM CURRENT USE OF INSULIN (HCC): Primary | ICD-10-CM

## 2019-08-14 DIAGNOSIS — E03.9 ACQUIRED HYPOTHYROIDISM: ICD-10-CM

## 2019-08-14 DIAGNOSIS — F41.1 GENERALIZED ANXIETY DISORDER: ICD-10-CM

## 2019-08-14 PROCEDURE — 99214 OFFICE O/P EST MOD 30 MIN: CPT | Performed by: FAMILY MEDICINE

## 2019-08-14 RX ORDER — ALPRAZOLAM 0.5 MG/1
0.5 TABLET ORAL 3 TIMES DAILY PRN
Qty: 60 TABLET | Refills: 2 | Status: SHIPPED | OUTPATIENT
Start: 2019-08-14 | End: 2020-02-14

## 2019-08-14 RX ORDER — HYDROCODONE BITARTRATE AND ACETAMINOPHEN 5; 325 MG/1; MG/1
1 TABLET ORAL EVERY 6 HOURS PRN
Qty: 60 TABLET | Refills: 0 | Status: SHIPPED | OUTPATIENT
Start: 2019-08-14 | End: 2019-10-24

## 2019-08-14 RX ORDER — OLMESARTAN MEDOXOMIL AND HYDROCHLOROTHIAZIDE 40/25 40; 25 MG/1; MG/1
1 TABLET ORAL DAILY
Qty: 90 TABLET | Refills: 0 | Status: SHIPPED | OUTPATIENT
Start: 2019-08-14 | End: 2020-04-22

## 2019-08-14 RX ORDER — BLOOD-GLUCOSE METER
EACH MISCELLANEOUS 2 TIMES DAILY
Refills: 0 | COMMUNITY
Start: 2019-06-11

## 2019-08-14 NOTE — PROGRESS NOTES
[facility] Family [facility] Office Note  Chief Complaint:   Patient presents with:  Diabetes      HPI:   This is a 44year old female coming in for DM2, HTN, hypothyroidism, hyperlipidemia, anxiety and pituitary macroadenoma.     1.  DM2 - The ignacio D & C     • ENDOMETRIAL ABLATION     • ENDOMETRIAL ABLATION  11/29/2012   • LAPAROSCOPIC CHOLECYSTECTOMY N/A 5/23/2017    Performed by Rufino Álvarez MD at [address] [place] [address] Dr   • [address] Bilateral 11/7/2018    Performed by Joann Ivy, tablets by mouth every 6 (six) hours as needed for Pain. Disp: 60 tablet Rfl: 0   glipiZIDE 10 MG Oral Tab Take 1 tablet (10 mg total) by mouth daily.  Every am before breakfast Disp: 90 tablet Rfl: 0   hydrocortisone 10 MG Oral Tab Take 2 tablets with dia pain  PSYCHIATRIC:  Denies history of depression, + anxiety. ENDOCRINOLOGIC:  Denies reports of sweating, cold or heat intolerance. Denies polyuria or polydipsia.     EXAM:   /88   Pulse 96   Temp 98.3 °F (36.8 °C)   Resp 18   Ht 65\"   Wt 260 lb   B free T4  -  Adjust synthroid accordingly    5. Generalized anxiety disorder  -  Stable  -  Continue present mgmt    6.  Pituitary macroadenoma  -  S/p resection  -  Doing well  -  Continue prednisone per neurosurgery  -  Further recs per neurosurgery    Med mellitus with microalbuminuria, without long-term current use of insulin (HCC)     Cervical radiculopathy     Osteoarthritis of spine with radiculopathy, lumbar region     Pituitary adenoma [facility])     Essential hypertension     S/P transsphenoidal hypophyse

## 2019-08-15 ENCOUNTER — PATIENT OUTREACH (OUTPATIENT)
Dept: FAMILY MEDICINE CLINIC | Facility: CLINIC | Age: 49
End: 2019-08-15

## 2019-08-15 DIAGNOSIS — Z12.11 SCREENING FOR COLON CANCER: Primary | ICD-10-CM

## 2019-08-15 NOTE — PROGRESS NOTES
Please call pt to inquire if pt has had a colonoscopy in the last 10 years and if so who performed it (name and phone #). Please let Luis Ards know so I can obtain the report.     If pt did not have a colonoscopy please advise them we will leave the FIT stoo

## 2019-08-27 ENCOUNTER — NURSE ONLY (OUTPATIENT)
Dept: HEMATOLOGY/ONCOLOGY | Facility: HOSPITAL | Age: 49
End: 2019-08-27
Attending: NEUROLOGICAL SURGERY
Payer: COMMERCIAL

## 2019-08-27 ENCOUNTER — OFFICE VISIT (OUTPATIENT)
Dept: NEUROLOGY | Facility: CLINIC | Age: 49
End: 2019-08-27
Payer: COMMERCIAL

## 2019-08-27 VITALS
RESPIRATION RATE: 18 BRPM | DIASTOLIC BLOOD PRESSURE: 91 MMHG | WEIGHT: 256.38 LBS | OXYGEN SATURATION: 97 % | TEMPERATURE: 98 F | HEART RATE: 76 BPM | BODY MASS INDEX: 42.71 KG/M2 | HEIGHT: 65 IN | SYSTOLIC BLOOD PRESSURE: 155 MMHG

## 2019-08-27 DIAGNOSIS — E23.7 PITUITARY LESION (HCC): ICD-10-CM

## 2019-08-27 DIAGNOSIS — Z86.018 S/P SELECTIVE TRANSSPHENOIDAL PITUITARY ADENOMECTOMY: Primary | ICD-10-CM

## 2019-08-27 DIAGNOSIS — Z98.890 S/P SELECTIVE TRANSSPHENOIDAL PITUITARY ADENOMECTOMY: Primary | ICD-10-CM

## 2019-08-27 PROCEDURE — 99024 POSTOP FOLLOW-UP VISIT: CPT | Performed by: PHYSICIAN ASSISTANT

## 2019-08-27 PROCEDURE — 99211 OFF/OP EST MAY X REQ PHY/QHP: CPT

## 2019-08-27 NOTE — PROGRESS NOTES
Neurosurgery Clinic Visit  2019    Emely Glaser PCP:  Danish Joseph DO DOB 1/3/1970 MRN [id number]           REASON FOR VISIT: MRI review, Neuro Conference review, s/p transnasal transsphenoidal endoscopic resection of pituitary tumor, 3 krish Disp: 100 strip Rfl: 0   ONETOUCH DELICA LANCETS 13Y Does not apply Misc 1 each by Does not apply route 2 (two) times daily before meals. Disp: 100 each Rfl: 0   Acetaminophen (ACETAMINOPHEN EXTRA STRENGTH) 500 MG Oral Cap Take 1,000 mg by mouth one time. images with fat suppression imaging without and with contrast.     CONTRAST USED:  22 cc of paramagnetic gadolinium-based contrast was injected intravenously.      FINDINGS:       MRI PUTUITARY  PITUITARY:            Is resection of previously noted pituita prescribed   - Patient to receive further narcotic/pain management from pain services if needed. 2. Imaging:    - Reviewed today:    - MRI pituitary:     - Agree with radiology.  Decreased mass size, but will monitor closely with further imaging.   - Order

## 2019-09-03 ENCOUNTER — PATIENT MESSAGE (OUTPATIENT)
Dept: FAMILY MEDICINE CLINIC | Facility: CLINIC | Age: 49
End: 2019-09-03

## 2019-09-03 NOTE — TELEPHONE ENCOUNTER
From: Leatha Girard  To: Catalina Peralta DO  Sent: 9/3/2019 9:33 AM CDT  Subject: Non-Urgent Medical Question    I have been checking my sugar.    Morning 9/1 408  Morning 9/2 323  Morning 9/3 388    Took 1- 500 metformin   Will take another this eveni

## 2019-09-04 NOTE — TELEPHONE ENCOUNTER
She should not be taking any more than 1000 mg of metformin twice daily as this will cause kidney failure if she does. She should continue metformin and glipizide and would recommend adding farxiga 5 mg daily.   Continue checking blood sugar in the morning

## 2019-09-04 NOTE — TELEPHONE ENCOUNTER
Information and recommendations discussed with patient voiced understanding, agreed with plan. Order entered. FYI  Patient reoprts she was taking Prednisone 15 mg(3 tabs daily) for 3 months, prescribed by another physician.   Stopped taking prednisone 9/1

## 2019-09-05 NOTE — TELEPHONE ENCOUNTER
If she just stopped taking prednisone, I would hold off on taking the new medication for at least 1 week to see how her blood sugars will respond. They will likely continue to drop over the next week or so.

## 2019-09-06 ENCOUNTER — EMPLOYEE HEALTH (OUTPATIENT)
Dept: OTHER | Facility: HOSPITAL | Age: 49
End: 2019-09-06
Attending: PREVENTIVE MEDICINE

## 2019-09-06 DIAGNOSIS — Z01.84 IMMUNITY STATUS TESTING: Primary | ICD-10-CM

## 2019-09-11 ENCOUNTER — APPOINTMENT (OUTPATIENT)
Dept: OTHER | Facility: HOSPITAL | Age: 49
End: 2019-09-11
Attending: PREVENTIVE MEDICINE

## 2019-09-11 ENCOUNTER — PATIENT MESSAGE (OUTPATIENT)
Dept: NEUROLOGY | Facility: CLINIC | Age: 49
End: 2019-09-11

## 2019-09-11 NOTE — TELEPHONE ENCOUNTER
From: Rashid Porter  To: Lizeth Scruggs MD  Sent: 9/11/2019 1:02 AM CDT  Subject: Other    Hi, I never got a letter to return back to work. I did return, however I dont have it in my records.      Can you please have a letter, stating that it is okay to

## 2019-09-16 ENCOUNTER — TELEPHONE (OUTPATIENT)
Dept: OBGYN CLINIC | Facility: CLINIC | Age: 49
End: 2019-09-16

## 2019-09-16 RX ORDER — CLOBETASOL PROPIONATE 0.5 MG/G
1 OINTMENT TOPICAL 2 TIMES DAILY
Qty: 1 TUBE | Refills: 0 | Status: CANCELLED | OUTPATIENT
Start: 2019-09-16

## 2019-09-16 NOTE — TELEPHONE ENCOUNTER
PC with patient. She is having a  lichen sclerosis flare up. Area is between lower vaginal opening and rectum. She states she applies Clobetasol after showering and it burns. Tries warm compresses to area. Told let the area dry completely before applying.  Aron Bazzi

## 2019-09-16 NOTE — TELEPHONE ENCOUNTER
----- Message from Abdias Arias sent at 9/16/2019 12:45 PM CDT -----  Regarding: Prescription Question  Contact: [phone]  Hi. My lichen sclerosis is very itchy and flare up is burning.  Is there an ointment that can be subscribed that has no steroids

## 2019-09-16 NOTE — TELEPHONE ENCOUNTER
PC with patient. Aware Dr Alanna Mcdonnell would like her to make an appointment to re-evaluate the area. She agrees.

## 2019-10-23 ENCOUNTER — TELEPHONE (OUTPATIENT)
Dept: OBGYN CLINIC | Facility: CLINIC | Age: 49
End: 2019-10-23

## 2019-10-23 NOTE — TELEPHONE ENCOUNTER
PC with patient. For the past few days she has had a \"zit\" that is pea size on her left breast. Purple in color. It was itching. She applied sone Cortisone 10 on it. It broke after that. Drained bloody mixed with some pus.  It was painful and felt better

## 2019-10-24 ENCOUNTER — OFFICE VISIT (OUTPATIENT)
Dept: OBGYN CLINIC | Facility: CLINIC | Age: 49
End: 2019-10-24
Payer: COMMERCIAL

## 2019-10-24 VITALS
TEMPERATURE: 98 F | SYSTOLIC BLOOD PRESSURE: 134 MMHG | BODY MASS INDEX: 39.49 KG/M2 | HEIGHT: 65 IN | RESPIRATION RATE: 16 BRPM | DIASTOLIC BLOOD PRESSURE: 90 MMHG | WEIGHT: 237 LBS | HEART RATE: 92 BPM

## 2019-10-24 DIAGNOSIS — N90.89 VULVAR IRRITATION: ICD-10-CM

## 2019-10-24 DIAGNOSIS — N61.1 BREAST ABSCESS: Primary | ICD-10-CM

## 2019-10-24 DIAGNOSIS — N90.4 LICHEN SCLEROSUS ET ATROPHICUS OF THE VULVA: ICD-10-CM

## 2019-10-24 PROCEDURE — 87186 SC STD MICRODIL/AGAR DIL: CPT | Performed by: NURSE PRACTITIONER

## 2019-10-24 PROCEDURE — 87205 SMEAR GRAM STAIN: CPT | Performed by: NURSE PRACTITIONER

## 2019-10-24 PROCEDURE — 87077 CULTURE AEROBIC IDENTIFY: CPT | Performed by: NURSE PRACTITIONER

## 2019-10-24 PROCEDURE — 99214 OFFICE O/P EST MOD 30 MIN: CPT | Performed by: NURSE PRACTITIONER

## 2019-10-24 RX ORDER — CLOBETASOL PROPIONATE 0.5 MG/G
1 OINTMENT TOPICAL NIGHTLY
Qty: 60 G | Refills: 2 | Status: SHIPPED | OUTPATIENT
Start: 2019-10-24 | End: 2020-01-29

## 2019-10-24 RX ORDER — FLUCONAZOLE 150 MG/1
TABLET ORAL
Qty: 2 TABLET | Refills: 0 | Status: SHIPPED | OUTPATIENT
Start: 2019-10-24 | End: 2019-12-02

## 2019-10-24 RX ORDER — CEPHALEXIN 500 MG/1
500 CAPSULE ORAL 4 TIMES DAILY
Qty: 40 CAPSULE | Refills: 0 | Status: SHIPPED | OUTPATIENT
Start: 2019-10-24 | End: 2019-11-03

## 2019-10-24 NOTE — PROGRESS NOTES
GYN H&P     10/24/2019  8:55 AM    CC: Patient is here to discuss her LS and open sore on breast    HPI: Patient is a 44year old  here for evaluation.  Reports about 1 week ago she felt a \"bump\" on her left breast. Also felt a lot of \"pressure\" next exam, Disp: 60 g, Rfl: 2  cephALEXin 500 MG Oral Cap, Take 1 capsule (500 mg total) by mouth 4 (four) times daily for 10 days. , Disp: 40 capsule, Rfl: 0  Blood Glucose Monitoring Suppl ([address]) w/Device Does not apply Kit, 2 (two) • Depression    • [facility]    • Disorder of thyroid    • Fibroids    • Heavy menses 06/15/2012   • High cholesterol    • History of uterine fibroid 06/17/2014   • Hypothyroid    • PONV (postoperative nausea and vomiting)    • Type II or un file    Tobacco Use      Smoking status: Former Smoker      Smokeless tobacco: Never Used      Tobacco comment: QUIT ABOUT 15 YEARS AGO PER PT REPORT    Substance and Sexual Activity      Alcohol use: Never        Frequency: Never      Drug use: Never dryness. There are 2 small pustules located along lower left edge of vulva.    no lesions, good perineal support  Urethra: meatus normal   Vagina: normal mucosa, no lesions, clear discharge   Cervix: normal os, no lesions or bleeding  R/V: normal perineum,

## 2019-10-24 NOTE — PROGRESS NOTES
Left breast lesion that broke yesterday and was draining bloody mixed with pus. Still draining. Pain better today. Vaginal itching. No discharge. Has pimples around labial area.

## 2019-10-28 RX ORDER — SULFAMETHOXAZOLE AND TRIMETHOPRIM 800; 160 MG/1; MG/1
1 TABLET ORAL 2 TIMES DAILY
Qty: 20 TABLET | Refills: 0 | Status: SHIPPED | OUTPATIENT
Start: 2019-10-28 | End: 2019-11-07

## 2019-11-08 RX ORDER — LEVOTHYROXINE SODIUM 88 UG/1
TABLET ORAL
Qty: 90 TABLET | Refills: 0 | Status: SHIPPED | OUTPATIENT
Start: 2019-11-08 | End: 2020-03-19

## 2019-11-11 RX ORDER — HYDROCODONE BITARTRATE AND ACETAMINOPHEN 5; 325 MG/1; MG/1
1-2 TABLET ORAL EVERY 6 HOURS PRN
Refills: 0 | OUTPATIENT
Start: 2019-11-11

## 2019-11-11 NOTE — TELEPHONE ENCOUNTER
Medication: Hydrocodone - Acetaminophen 5-325 (60 tablets)    Date of last refill: 08/15/2019    [place] 14/53/2448    Last office visit: 06/18/2019    Due back to clinic per last office note:  Last appointment was [facility]

## 2019-11-11 NOTE — TELEPHONE ENCOUNTER
LVM message, detailed (Ok per Hippa) with below information regarding refusal on refill for [place]

## 2019-11-11 NOTE — TELEPHONE ENCOUNTER
Please see below plan from last OV with Dr. Shade Flynn and myself:    1. Medication: None prescribed                - Patient to receive further narcotic/pain management from pain services if needed.   2. Imaging:                 - Reviewed today:

## 2019-12-01 ENCOUNTER — PATIENT MESSAGE (OUTPATIENT)
Dept: OBGYN CLINIC | Facility: CLINIC | Age: 49
End: 2019-12-01

## 2019-12-02 RX ORDER — FLUCONAZOLE 150 MG/1
TABLET ORAL
Qty: 2 TABLET | Refills: 0 | Status: SHIPPED | OUTPATIENT
Start: 2019-12-02 | End: 2020-01-07

## 2019-12-02 RX ORDER — CLOBETASOL PROPIONATE 0.5 MG/G
1 OINTMENT TOPICAL 2 TIMES DAILY
Qty: 60 G | Refills: 0 | Status: SHIPPED | OUTPATIENT
Start: 2019-12-02 | End: 2020-01-29

## 2019-12-02 NOTE — TELEPHONE ENCOUNTER
From: Elma Yadav  To: BAM Antunez  Sent: 12/1/2019 7:58 AM CST  Subject: Prescription Question    Clobetasol Propionate 0.05 % Oint  Can I get the cream again and flucinzole tablet.    It cleared up, but now continue to itch again and painful

## 2019-12-08 ENCOUNTER — TELEPHONE (OUTPATIENT)
Dept: FAMILY MEDICINE CLINIC | Facility: CLINIC | Age: 49
End: 2019-12-08

## 2019-12-08 NOTE — TELEPHONE ENCOUNTER
Patient has FIT order but hasn't done yet. Please call to remind her to complete FIT and discuss having a colonoscopy with Dr. Darian Denton at next visit.

## 2020-01-07 ENCOUNTER — OFFICE VISIT (OUTPATIENT)
Dept: OBGYN CLINIC | Facility: CLINIC | Age: 50
End: 2020-01-07
Payer: COMMERCIAL

## 2020-01-07 VITALS
RESPIRATION RATE: 12 BRPM | DIASTOLIC BLOOD PRESSURE: 84 MMHG | HEIGHT: 65 IN | WEIGHT: 224 LBS | BODY MASS INDEX: 37.32 KG/M2 | TEMPERATURE: 97 F | HEART RATE: 84 BPM | SYSTOLIC BLOOD PRESSURE: 132 MMHG

## 2020-01-07 DIAGNOSIS — Z01.419 WELL WOMAN EXAM WITH ROUTINE GYNECOLOGICAL EXAM: Primary | ICD-10-CM

## 2020-01-07 DIAGNOSIS — Z12.4 SCREENING FOR MALIGNANT NEOPLASM OF THE CERVIX: ICD-10-CM

## 2020-01-07 DIAGNOSIS — Z11.51 SCREENING FOR HUMAN PAPILLOMAVIRUS: ICD-10-CM

## 2020-01-07 DIAGNOSIS — L29.2 VULVAR ITCHING: ICD-10-CM

## 2020-01-07 DIAGNOSIS — Z12.39 BREAST CANCER SCREENING: ICD-10-CM

## 2020-01-07 DIAGNOSIS — N90.4 LICHEN SCLEROSUS ET ATROPHICUS OF THE VULVA: ICD-10-CM

## 2020-01-07 PROCEDURE — 88175 CYTOPATH C/V AUTO FLUID REDO: CPT | Performed by: NURSE PRACTITIONER

## 2020-01-07 PROCEDURE — 99396 PREV VISIT EST AGE 40-64: CPT | Performed by: NURSE PRACTITIONER

## 2020-01-07 PROCEDURE — 87480 CANDIDA DNA DIR PROBE: CPT | Performed by: NURSE PRACTITIONER

## 2020-01-07 PROCEDURE — 87070 CULTURE OTHR SPECIMN AEROBIC: CPT | Performed by: NURSE PRACTITIONER

## 2020-01-07 PROCEDURE — 87660 TRICHOMONAS VAGIN DIR PROBE: CPT | Performed by: NURSE PRACTITIONER

## 2020-01-07 PROCEDURE — 87186 SC STD MICRODIL/AGAR DIL: CPT | Performed by: NURSE PRACTITIONER

## 2020-01-07 PROCEDURE — 87510 GARDNER VAG DNA DIR PROBE: CPT | Performed by: NURSE PRACTITIONER

## 2020-01-07 PROCEDURE — 87624 HPV HI-RISK TYP POOLED RSLT: CPT | Performed by: NURSE PRACTITIONER

## 2020-01-07 PROCEDURE — 99213 OFFICE O/P EST LOW 20 MIN: CPT | Performed by: NURSE PRACTITIONER

## 2020-01-07 PROCEDURE — 87077 CULTURE AEROBIC IDENTIFY: CPT | Performed by: NURSE PRACTITIONER

## 2020-01-07 PROCEDURE — 87205 SMEAR GRAM STAIN: CPT | Performed by: NURSE PRACTITIONER

## 2020-01-07 RX ORDER — CLOBETASOL PROPIONATE 0.5 MG/G
1 OINTMENT TOPICAL 2 TIMES DAILY
Qty: 60 G | Refills: 1 | Status: SHIPPED | OUTPATIENT
Start: 2020-01-07 | End: 2020-03-19

## 2020-01-07 NOTE — PROGRESS NOTES
HPI:   Haydee Adames is a 44year old  who presents for an annual gynecological exam. Pt present for her annual exam and re-examination of her LS.  She reports the itching is so severe for the last 3 weeks and the areas of LS have spread to the surro Tab Take 1 tablet (10 mg total) by mouth daily. Every am before breakfast 90 tablet 0   • Glucose Blood (ONETOUCH VERIO) In Vitro Strip Use to check blood sugars twice a day before meals.  100 strip 0   • ONETOUCH DELICA LANCETS 80E Does not apply Misc 1 ea Use      Smoking status: Former Smoker      Smokeless tobacco: Never Used      Tobacco comment: QUIT ABOUT 15 YEARS AGO PER PT REPORT    Alcohol use: Never      Frequency: Never    Drug use: Never       REVIEW OF SYSTEMS:   CONSTITUTIONAL: generally feels maintaining a healthy diet heavy in fruits and vegetables. ·  I encouraged the pt to stay away from fast foods and fried foods.   ·  I encouraged pt to incorporate milk products into her diet to ensure adequate calcium intake, specifically with yogurt, mil screening  -     [facility] SEEMA 2D+3D SCREENING BILAT (CPT=77067/74100); Future    Lichen sclerosus et atrophicus of the vulva  -     Clobetasol Propionate 0.05 % External Ointment;  Apply 1 Application topically 2 (two) times daily.  -     AEROBIC BACTERIAL CULT

## 2020-01-08 LAB — HPV I/H RISK 1 DNA SPEC QL NAA+PROBE: NEGATIVE

## 2020-01-08 RX ORDER — FLUCONAZOLE 150 MG/1
TABLET ORAL
Qty: 3 TABLET | Refills: 0 | Status: SHIPPED | OUTPATIENT
Start: 2020-01-08 | End: 2020-01-13

## 2020-01-08 RX ORDER — NYSTATIN 100000 [USP'U]/G
1 POWDER TOPICAL 3 TIMES DAILY
Qty: 60 G | Refills: 1 | Status: SHIPPED | OUTPATIENT
Start: 2020-01-08 | End: 2020-03-19

## 2020-01-13 RX ORDER — FLUCONAZOLE 150 MG/1
TABLET ORAL
Qty: 2 TABLET | Refills: 0 | Status: SHIPPED | OUTPATIENT
Start: 2020-01-13 | End: 2020-03-19

## 2020-01-13 RX ORDER — SULFAMETHOXAZOLE AND TRIMETHOPRIM 800; 160 MG/1; MG/1
1 TABLET ORAL 2 TIMES DAILY
Qty: 20 TABLET | Refills: 0 | Status: SHIPPED | OUTPATIENT
Start: 2020-01-13 | End: 2020-01-23

## 2020-01-15 ENCOUNTER — TELEPHONE (OUTPATIENT)
Dept: PAIN CLINIC | Facility: CLINIC | Age: 50
End: 2020-01-15

## 2020-01-15 NOTE — TELEPHONE ENCOUNTER
While discussing another family member, pt states she would like to schedule an OV w/ Dr Lola Gaspar. Pt reports pain in lower back is returning. Placed on schedule for Friday, 1/17/2020.

## 2020-01-29 ENCOUNTER — OFFICE VISIT (OUTPATIENT)
Dept: OBGYN CLINIC | Facility: CLINIC | Age: 50
End: 2020-01-29
Payer: COMMERCIAL

## 2020-01-29 VITALS
WEIGHT: 221 LBS | BODY MASS INDEX: 36.82 KG/M2 | RESPIRATION RATE: 16 BRPM | HEIGHT: 65 IN | SYSTOLIC BLOOD PRESSURE: 126 MMHG | HEART RATE: 84 BPM | DIASTOLIC BLOOD PRESSURE: 78 MMHG

## 2020-01-29 DIAGNOSIS — L29.2 VULVAR ITCHING: ICD-10-CM

## 2020-01-29 DIAGNOSIS — N90.4 LICHEN SCLEROSUS ET ATROPHICUS OF THE VULVA: Primary | ICD-10-CM

## 2020-01-29 DIAGNOSIS — R82.90 ABNORMAL URINE FINDING: ICD-10-CM

## 2020-01-29 LAB
APPEARANCE: CLEAR
BILIRUBIN: NEGATIVE
GLUCOSE (URINE DIPSTICK): 500 MG/DL
KETONES (URINE DIPSTICK): NEGATIVE MG/DL
LEUKOCYTES: NEGATIVE
MULTISTIX LOT#: NORMAL NUMERIC
NITRITE, URINE: NEGATIVE
OCCULT BLOOD: NEGATIVE
PH, URINE: 5.5 (ref 4.5–8)
PROTEIN (URINE DIPSTICK): NEGATIVE MG/DL
SPECIFIC GRAVITY: 1.01 (ref 1–1.03)
URINE-COLOR: YELLOW
UROBILINOGEN,SEMI-QN: 0.2 MG/DL (ref 0–1.9)

## 2020-01-29 PROCEDURE — 99213 OFFICE O/P EST LOW 20 MIN: CPT | Performed by: NURSE PRACTITIONER

## 2020-01-29 PROCEDURE — 81002 URINALYSIS NONAUTO W/O SCOPE: CPT | Performed by: NURSE PRACTITIONER

## 2020-01-29 PROCEDURE — 87086 URINE CULTURE/COLONY COUNT: CPT | Performed by: NURSE PRACTITIONER

## 2020-01-29 NOTE — PROGRESS NOTES
Leatha Girard is a 44year old female. HPI:   Patient presents with: Other: follow up    Pt was seen earlier this month for her annual exam and was found to have severe itching and \"spreading\" of her LS.  Pt had previously discontinued her Clobetasol • HYDROcodone-acetaminophen 5-325 MG Oral Tab Take 1-2 tablets by mouth every 6 (six) hours as needed for Pain. 60 tablet 0   • glipiZIDE 10 MG Oral Tab Take 1 tablet (10 mg total) by mouth daily.  Every am before breakfast 90 tablet 0   • Glucose Blood ( 6/4/2019    Performed by Yuan Ricci MD at [facility] MAIN OR   • OTHER SURGICAL HISTORY      Pituitary gland tumors   • TRANS SPENOIDAL  TRANSNASAL HYPOPHYSECTOMY N/A 6/4/2019    Performed by Yao Reis MD at [facility] MAIN OR     ROS: all other systems negat

## 2020-02-13 DIAGNOSIS — F41.1 GENERALIZED ANXIETY DISORDER: ICD-10-CM

## 2020-02-14 RX ORDER — ALPRAZOLAM 0.5 MG/1
TABLET ORAL
Qty: 60 TABLET | Refills: 0 | Status: SHIPPED | OUTPATIENT
Start: 2020-02-14 | End: 2020-03-19

## 2020-02-24 ENCOUNTER — HOSPITAL ENCOUNTER (OUTPATIENT)
Dept: MAMMOGRAPHY | Age: 50
Discharge: HOME | End: 2020-02-24
Attending: NURSE PRACTITIONER
Payer: COMMERCIAL

## 2020-02-24 DIAGNOSIS — Z12.39 BREAST CANCER SCREENING: ICD-10-CM

## 2020-02-24 PROCEDURE — 77067 SCR MAMMO BI INCL CAD: CPT | Performed by: NURSE PRACTITIONER

## 2020-02-24 PROCEDURE — 77063 BREAST TOMOSYNTHESIS BI: CPT | Performed by: NURSE PRACTITIONER

## 2020-03-19 DIAGNOSIS — E11.65 UNCONTROLLED TYPE 2 DIABETES MELLITUS WITH MICROALBUMINURIA, WITHOUT LONG-TERM CURRENT USE OF INSULIN (HCC): ICD-10-CM

## 2020-03-19 DIAGNOSIS — N90.4 LICHEN SCLEROSUS ET ATROPHICUS OF THE VULVA: ICD-10-CM

## 2020-03-19 DIAGNOSIS — F41.1 GENERALIZED ANXIETY DISORDER: ICD-10-CM

## 2020-03-19 DIAGNOSIS — R80.9 UNCONTROLLED TYPE 2 DIABETES MELLITUS WITH MICROALBUMINURIA, WITHOUT LONG-TERM CURRENT USE OF INSULIN (HCC): ICD-10-CM

## 2020-03-19 DIAGNOSIS — E11.29 UNCONTROLLED TYPE 2 DIABETES MELLITUS WITH MICROALBUMINURIA, WITHOUT LONG-TERM CURRENT USE OF INSULIN (HCC): ICD-10-CM

## 2020-03-19 RX ORDER — HYDROCODONE BITARTRATE AND ACETAMINOPHEN 5; 325 MG/1; MG/1
1-2 TABLET ORAL EVERY 6 HOURS PRN
Qty: 60 TABLET | Refills: 0 | Status: CANCELLED | OUTPATIENT
Start: 2020-03-19

## 2020-03-20 RX ORDER — GLIPIZIDE 10 MG/1
10 TABLET ORAL DAILY
Qty: 90 TABLET | Refills: 0 | Status: SHIPPED | OUTPATIENT
Start: 2020-03-20 | End: 2020-04-22 | Stop reason: ALTCHOICE

## 2020-03-20 RX ORDER — LEVOTHYROXINE SODIUM 88 UG/1
88 TABLET ORAL
Qty: 90 TABLET | Refills: 0 | Status: SHIPPED | OUTPATIENT
Start: 2020-03-20 | End: 2020-06-21

## 2020-03-20 RX ORDER — NYSTATIN 100000 [USP'U]/G
1 POWDER TOPICAL 3 TIMES DAILY
Qty: 60 G | Refills: 1 | Status: SHIPPED | OUTPATIENT
Start: 2020-03-20 | End: 2020-10-20

## 2020-03-20 RX ORDER — CLOBETASOL PROPIONATE 0.5 MG/G
1 OINTMENT TOPICAL 2 TIMES DAILY
Qty: 60 G | Refills: 1 | Status: SHIPPED | OUTPATIENT
Start: 2020-03-20 | End: 2020-10-20

## 2020-03-20 RX ORDER — FLUCONAZOLE 150 MG/1
TABLET ORAL
Qty: 2 TABLET | Refills: 0 | Status: SHIPPED | OUTPATIENT
Start: 2020-03-20 | End: 2020-11-17 | Stop reason: ALTCHOICE

## 2020-03-20 RX ORDER — ALPRAZOLAM 0.5 MG/1
0.5 TABLET ORAL 3 TIMES DAILY PRN
Qty: 60 TABLET | Refills: 0 | Status: SHIPPED | OUTPATIENT
Start: 2020-03-20 | End: 2020-06-21

## 2020-03-20 NOTE — TELEPHONE ENCOUNTER
Per Champ Blake Patient to receive further narcotic/pain management from pain services if needed. This medication denied.

## 2020-03-20 NOTE — TELEPHONE ENCOUNTER
PC with patient. Looking for refill on 3 medications. History of lichen sclerosis. Having labial internal/external itching and flaky skin. Has small bumps in groin area like when she was diagnosed with MRSA.  Area had white like pus, now is dried up and scab h

## 2020-04-15 RX ORDER — HYDROCODONE BITARTRATE AND ACETAMINOPHEN 5; 325 MG/1; MG/1
1-2 TABLET ORAL EVERY 6 HOURS PRN
Qty: 60 TABLET | Refills: 0 | OUTPATIENT
Start: 2020-04-15

## 2020-04-15 NOTE — TELEPHONE ENCOUNTER
Patient returned call, agreeable to video visit on 4/21 with Dr. Kayla Velazquez. Explained process of accessing video visit via [employer], patient verbalized understanding.

## 2020-04-15 NOTE — TELEPHONE ENCOUNTER
Medication: Hydrocodone-acetaminophen 5-325 mg take 1-2 tablets PO Q 6 hours prn for pain.  #60    Date of last refill: 6/18/19  [place]    Last office visit: 6/18/10  Due back to clinic per last office note:  4 w

## 2020-04-15 NOTE — TELEPHONE ENCOUNTER
Noted response from NS below. Pt will need an OV as she has not been seen in the Pain Clinic since 10/25/18.

## 2020-04-15 NOTE — TELEPHONE ENCOUNTER
Below from 11/2019 OV:    Please see below plan from last OV with Dr. Gisell Handley and myself:     1. Medication: None prescribed                - Patient to receive further narcotic/pain management from pain services if needed.     Patient must consult pain servi

## 2020-04-15 NOTE — TELEPHONE ENCOUNTER
See last office visit 8/27/19 with Hafsa Pollock and refill TE's 11/8/19 and 3/19/2020. Patient was informed that Kenzie Fortune has been denied and she will need to discuss further options with pain service.     Visuu message sent again to patient to remind her of the

## 2020-04-16 ENCOUNTER — PATIENT MESSAGE (OUTPATIENT)
Dept: FAMILY MEDICINE CLINIC | Facility: CLINIC | Age: 50
End: 2020-04-16

## 2020-04-17 ENCOUNTER — VIRTUAL PHONE E/M (OUTPATIENT)
Dept: FAMILY MEDICINE CLINIC | Facility: CLINIC | Age: 50
End: 2020-04-17
Payer: COMMERCIAL

## 2020-04-17 DIAGNOSIS — J30.2 SEASONAL ALLERGIES: ICD-10-CM

## 2020-04-17 DIAGNOSIS — E11.65 UNCONTROLLED TYPE 2 DIABETES MELLITUS WITH MICROALBUMINURIA, WITHOUT LONG-TERM CURRENT USE OF INSULIN (HCC): ICD-10-CM

## 2020-04-17 DIAGNOSIS — R80.9 UNCONTROLLED TYPE 2 DIABETES MELLITUS WITH MICROALBUMINURIA, WITHOUT LONG-TERM CURRENT USE OF INSULIN (HCC): ICD-10-CM

## 2020-04-17 DIAGNOSIS — J01.01 ACUTE RECURRENT MAXILLARY SINUSITIS: Primary | ICD-10-CM

## 2020-04-17 DIAGNOSIS — E11.29 UNCONTROLLED TYPE 2 DIABETES MELLITUS WITH MICROALBUMINURIA, WITHOUT LONG-TERM CURRENT USE OF INSULIN (HCC): ICD-10-CM

## 2020-04-17 PROCEDURE — 99214 OFFICE O/P EST MOD 30 MIN: CPT | Performed by: FAMILY MEDICINE

## 2020-04-17 RX ORDER — AMOXICILLIN AND CLAVULANATE POTASSIUM 875; 125 MG/1; MG/1
1 TABLET, FILM COATED ORAL 2 TIMES DAILY
Qty: 20 TABLET | Refills: 0 | Status: SHIPPED | OUTPATIENT
Start: 2020-04-17 | End: 2020-04-22

## 2020-04-17 NOTE — PROGRESS NOTES
Virtual/Telephone Check-In    Cristy Cueto verbally consents to a Virtual/Telephone Check-In service on 04/17/20. Patient understands and accepts financial responsibility for any deductible, co-insurance and/or co-pays associated with this service.  This v Dispense Refill   • Amoxicillin-Pot Clavulanate 875-125 MG Oral Tab Take 1 tablet by mouth 2 (two) times daily for 10 days. 20 tablet 0   • Metoprolol Succinate ER 50 MG Oral Tablet 24 Hr Take 1 tablet (50 mg total) by mouth daily.  90 tablet 0   • atorvast topically 2 (two) times daily as needed.  60 g 1     Patient Active Problem List:     Biliary colic     Intractable abdominal pain     Essential hypertension with goal blood pressure less than 130/80     Diabetes mellitus type 2 in obese [facility])     Acquired

## 2020-04-18 ENCOUNTER — TELEPHONE (OUTPATIENT)
Dept: FAMILY MEDICINE CLINIC | Facility: CLINIC | Age: 50
End: 2020-04-18

## 2020-04-18 ENCOUNTER — HOSPITAL ENCOUNTER (EMERGENCY)
Facility: HOSPITAL | Age: 50
Discharge: HOME | End: 2020-04-18
Attending: EMERGENCY MEDICINE
Payer: COMMERCIAL

## 2020-04-18 ENCOUNTER — APPOINTMENT (OUTPATIENT)
Dept: LAB | Facility: HOSPITAL | Age: 50
End: 2020-04-18
Attending: FAMILY MEDICINE
Payer: COMMERCIAL

## 2020-04-18 VITALS
SYSTOLIC BLOOD PRESSURE: 139 MMHG | TEMPERATURE: 98 F | HEART RATE: 104 BPM | OXYGEN SATURATION: 95 % | DIASTOLIC BLOOD PRESSURE: 93 MMHG | WEIGHT: 215 LBS | BODY MASS INDEX: 36.7 KG/M2 | HEIGHT: 64 IN | RESPIRATION RATE: 22 BRPM

## 2020-04-18 DIAGNOSIS — E11.65 UNCONTROLLED TYPE 2 DIABETES MELLITUS WITH MICROALBUMINURIA, WITHOUT LONG-TERM CURRENT USE OF INSULIN (HCC): ICD-10-CM

## 2020-04-18 DIAGNOSIS — E11.29 UNCONTROLLED TYPE 2 DIABETES MELLITUS WITH MICROALBUMINURIA, WITHOUT LONG-TERM CURRENT USE OF INSULIN (HCC): ICD-10-CM

## 2020-04-18 DIAGNOSIS — R73.9 HYPERGLYCEMIA: Primary | ICD-10-CM

## 2020-04-18 DIAGNOSIS — I10 ESSENTIAL HYPERTENSION WITH GOAL BLOOD PRESSURE LESS THAN 130/80: ICD-10-CM

## 2020-04-18 DIAGNOSIS — R80.9 UNCONTROLLED TYPE 2 DIABETES MELLITUS WITH MICROALBUMINURIA, WITHOUT LONG-TERM CURRENT USE OF INSULIN (HCC): ICD-10-CM

## 2020-04-18 DIAGNOSIS — E03.9 ACQUIRED HYPOTHYROIDISM: ICD-10-CM

## 2020-04-18 DIAGNOSIS — E78.2 MIXED HYPERLIPIDEMIA: ICD-10-CM

## 2020-04-18 PROCEDURE — 99284 EMERGENCY DEPT VISIT MOD MDM: CPT

## 2020-04-18 PROCEDURE — 80053 COMPREHEN METABOLIC PANEL: CPT

## 2020-04-18 PROCEDURE — 83036 HEMOGLOBIN GLYCOSYLATED A1C: CPT

## 2020-04-18 PROCEDURE — 82962 GLUCOSE BLOOD TEST: CPT

## 2020-04-18 PROCEDURE — 96372 THER/PROPH/DIAG INJ SC/IM: CPT

## 2020-04-18 PROCEDURE — 36415 COLL VENOUS BLD VENIPUNCTURE: CPT

## 2020-04-18 PROCEDURE — 82043 UR ALBUMIN QUANTITATIVE: CPT

## 2020-04-18 PROCEDURE — 82570 ASSAY OF URINE CREATININE: CPT

## 2020-04-18 PROCEDURE — 85025 COMPLETE CBC W/AUTO DIFF WBC: CPT | Performed by: EMERGENCY MEDICINE

## 2020-04-18 PROCEDURE — 96360 HYDRATION IV INFUSION INIT: CPT

## 2020-04-18 PROCEDURE — 87086 URINE CULTURE/COLONY COUNT: CPT | Performed by: EMERGENCY MEDICINE

## 2020-04-18 PROCEDURE — 87186 SC STD MICRODIL/AGAR DIL: CPT | Performed by: EMERGENCY MEDICINE

## 2020-04-18 PROCEDURE — 84439 ASSAY OF FREE THYROXINE: CPT

## 2020-04-18 PROCEDURE — 80053 COMPREHEN METABOLIC PANEL: CPT | Performed by: EMERGENCY MEDICINE

## 2020-04-18 PROCEDURE — 87088 URINE BACTERIA CULTURE: CPT | Performed by: EMERGENCY MEDICINE

## 2020-04-18 PROCEDURE — 82803 BLOOD GASES ANY COMBINATION: CPT | Performed by: EMERGENCY MEDICINE

## 2020-04-18 PROCEDURE — 81001 URINALYSIS AUTO W/SCOPE: CPT | Performed by: EMERGENCY MEDICINE

## 2020-04-18 PROCEDURE — 85025 COMPLETE CBC W/AUTO DIFF WBC: CPT

## 2020-04-18 PROCEDURE — 80061 LIPID PANEL: CPT

## 2020-04-18 PROCEDURE — 84443 ASSAY THYROID STIM HORMONE: CPT

## 2020-04-18 RX ORDER — BLOOD-GLUCOSE METER
1 KIT MISCELLANEOUS AS NEEDED
Qty: 1 KIT | Refills: 0 | Status: SHIPPED | OUTPATIENT
Start: 2020-04-18 | End: 2020-04-22 | Stop reason: ALTCHOICE

## 2020-04-18 RX ORDER — INSULIN ASPART 100 [IU]/ML
0.1 INJECTION, SOLUTION INTRAVENOUS; SUBCUTANEOUS ONCE
Status: COMPLETED | OUTPATIENT
Start: 2020-04-18 | End: 2020-04-18

## 2020-04-18 NOTE — CM/SW NOTE
Received a call from patient stating she was missing the test strips - called [facility] [phone] and talked to [facility] - verbal order given - called patient back stating pharmacy will contact her when strips are ready to be picked up    4:35PM - Amira Jin

## 2020-04-18 NOTE — TELEPHONE ENCOUNTER
I was paged by the emergency room regarding patient. Her sugars going down to give her some insulin. She is not in DKA.   Patient admitted to the ER doctor but she is eating takes outs and not eating well since the Covid 19 pandemic they are thinking to d

## 2020-04-18 NOTE — TELEPHONE ENCOUNTER
I was paged by the lab regarding critical test result. Patient blood sugar was at 578. I attempted to call the patient she did not  the phone I left a message to page me because I do have a critical test results .  I also recommended that if patien

## 2020-04-18 NOTE — ED NOTES
Received call from patient, stating the pharmacy would not fill the insulin because needles were not written for. Discussed with Dr. Lottie Hidalgo. Pharmacy information given to , who has the pharmacy information and will follow up.

## 2020-04-18 NOTE — ED NOTES
Patient given Rx's, AVS, and thorough discharge teaching on using her insulin pen. Time spent with patient discussing questions and concerns. Patient will follow-up with her PCP on Monday.

## 2020-04-18 NOTE — ED PROVIDER NOTES
Patient Seen in: [facility] Emergency Department      History   Patient presents with:  Abnormal Result    Stated Complaint: hyperglycemia, pt has type 2 DM on metformin    HPI    68-year-old female with history of non-insulin-dependent diabetes pre Social History    Tobacco Use      Smoking status: Former Smoker      Smokeless tobacco: Never Used      Tobacco comment: QUIT ABOUT 15 YEARS AGO PER PT REPORT    Alcohol use: Never      Frequency: Never    Drug use: Never             Review of S within normal limits   URINALYSIS WITH CULTURE REFLEX - Abnormal; Notable for the following components:    Glucose Urine >=500 (*)     Ketones Urine 20  (*)     Leukocyte Esterase Urine Trace (*)     WBC Urine 5-10 (*)     Bacteria Urine Rare (*)     Squam Impression:  Hyperglycemia  (primary encounter diagnosis)    Disposition:  There is no disposition on file for this visit. There is no disposition time on file for this visit.     Follow-up:  Carolee Infante DO  [address] [facility]  [address]

## 2020-04-18 NOTE — CM/SW NOTE
Per ED MD, patient's MD wants patient to go home with insulin. Faxed referral form for diabetes education and sent bubble message to patient's MD that they will likely need to place an order as well.   Spoke with patient who is in agreement of purchasing a

## 2020-04-18 NOTE — TELEPHONE ENCOUNTER
Patient called me back. I explained that her sugar is extremely high. She needs to go  to emergency room for further blood test and to bring the sugars down. Patient voiced understanding. She will go to the ER immediately. ER was notified.

## 2020-04-18 NOTE — ED INITIAL ASSESSMENT (HPI)
Arrives after was referred per physician for hyperglycemia. Hx of type 2 diabetes, on oral hypoglycemics. Was on steroids in December following brain surgery. States has had polyuria, polydipsia, polyphagia. Glucose high 500s.

## 2020-04-19 ENCOUNTER — TELEPHONE (OUTPATIENT)
Dept: FAMILY MEDICINE CLINIC | Facility: CLINIC | Age: 50
End: 2020-04-19

## 2020-04-19 DIAGNOSIS — E11.65 UNCONTROLLED TYPE 2 DIABETES MELLITUS WITH HYPERGLYCEMIA (HCC): Primary | ICD-10-CM

## 2020-04-19 NOTE — TELEPHONE ENCOUNTER
Please setup patient with Dr. Tony Medina office asap for diabetes management since her A1c is very high at 16. Please call their office and see if they can schedule her with a televisit in the next day or so.

## 2020-04-19 NOTE — TELEPHONE ENCOUNTER
I have received message from the emergency room nurse. They put a referral for patient to have diabetic education, but  she may need a official order from the PCP placed. Note sent to Dr. johns.

## 2020-04-19 NOTE — TELEPHONE ENCOUNTER
Order placed for Dr. Josesito Herrera. Please call their office to get her an appt asap as her A1c > 16. She can receive diabetic education through their office. See other telephone note with same information.

## 2020-04-20 RX ORDER — CEPHALEXIN 500 MG/1
500 CAPSULE ORAL 4 TIMES DAILY
Qty: 40 CAPSULE | Refills: 0 | Status: SHIPPED | OUTPATIENT
Start: 2020-04-20 | End: 2020-04-30

## 2020-04-20 NOTE — TELEPHONE ENCOUNTER
GOLDIE to [employer] this am at Dr. Rolo Tao office. They are going to call pt and set her up for a tele visit. Pt is aware. Pt is to call me if she does not hear form them today. Pt agreed to plan and verbalized understanding.

## 2020-04-20 NOTE — TELEPHONE ENCOUNTER
BHUMIKA: I called and spoke with Juan Francisco Monterroso at Dr. Kenzie Panchal office. They are doing virtual phone visits with pt.s Juan Francisco Monterroso from Dr. Olivier President office will call her today and get her scheduled. I called and spoke to [place].  I informed her Juan Francisco Monterroso form Dr. Kenzie valadez

## 2020-04-21 ENCOUNTER — TELEPHONE (OUTPATIENT)
Dept: SURGERY | Facility: CLINIC | Age: 50
End: 2020-04-21

## 2020-04-21 NOTE — TELEPHONE ENCOUNTER
Pt transferred live to triage-tearful, sts \"feeling very anxious due to recent ER visit 4/18 w blood sugar > 500. was told to give 6 units of insulin for blood sugar > 300.  But was not told how often to check blood sugars, how often is safe to give insuli

## 2020-04-21 NOTE — TELEPHONE ENCOUNTER
Advised may take alprazolam 1 mg this evening to help her calm down. Patient does have an appointment with the diabetic clinic, Dr. Oly Bay tomorrow.   Please reiterate things she could avoid to help decrease her blood sugar like soda, candy, bread, rice

## 2020-04-21 NOTE — TELEPHONE ENCOUNTER
Call to pt-advised (per discussion w dr Sylvia Friend) that she prefers dr [facility] provide further clarification and instructions re blood sugar testing, insulin and further management of her diabetes through appt as scheduled for tomorrow morning.  Val

## 2020-04-21 NOTE — TELEPHONE ENCOUNTER
Pt would like to know if she can get a note to be off work for a few days to focus on her health. Her blood sugar is still 350.  Transferred to nurse

## 2020-04-22 ENCOUNTER — TELEMEDICINE (OUTPATIENT)
Dept: ENDOCRINOLOGY CLINIC | Facility: CLINIC | Age: 50
End: 2020-04-22
Payer: COMMERCIAL

## 2020-04-22 ENCOUNTER — NURSE ONLY (OUTPATIENT)
Dept: ENDOCRINOLOGY CLINIC | Facility: CLINIC | Age: 50
End: 2020-04-22
Payer: COMMERCIAL

## 2020-04-22 VITALS — DIASTOLIC BLOOD PRESSURE: 83 MMHG | BODY MASS INDEX: 37 KG/M2 | WEIGHT: 214.81 LBS | SYSTOLIC BLOOD PRESSURE: 128 MMHG

## 2020-04-22 DIAGNOSIS — Z79.4 TYPE 2 DIABETES MELLITUS WITH HYPERGLYCEMIA, WITH LONG-TERM CURRENT USE OF INSULIN (HCC): Primary | ICD-10-CM

## 2020-04-22 DIAGNOSIS — E11.65 TYPE 2 DIABETES MELLITUS WITH HYPERGLYCEMIA, WITH LONG-TERM CURRENT USE OF INSULIN (HCC): Primary | ICD-10-CM

## 2020-04-22 DIAGNOSIS — E11.29 UNCONTROLLED TYPE 2 DIABETES MELLITUS WITH MICROALBUMINURIA, WITHOUT LONG-TERM CURRENT USE OF INSULIN (HCC): Primary | ICD-10-CM

## 2020-04-22 DIAGNOSIS — E11.65 UNCONTROLLED TYPE 2 DIABETES MELLITUS WITH MICROALBUMINURIA, WITHOUT LONG-TERM CURRENT USE OF INSULIN (HCC): Primary | ICD-10-CM

## 2020-04-22 DIAGNOSIS — R80.9 UNCONTROLLED TYPE 2 DIABETES MELLITUS WITH MICROALBUMINURIA, WITHOUT LONG-TERM CURRENT USE OF INSULIN (HCC): Primary | ICD-10-CM

## 2020-04-22 PROCEDURE — G0108 DIAB MANAGE TRN  PER INDIV: HCPCS | Performed by: DIETITIAN, REGISTERED

## 2020-04-22 PROCEDURE — 99214 OFFICE O/P EST MOD 30 MIN: CPT | Performed by: NURSE PRACTITIONER

## 2020-04-22 RX ORDER — PEN NEEDLE, DIABETIC 32GX 5/32"
NEEDLE, DISPOSABLE MISCELLANEOUS
COMMUNITY
Start: 2020-04-18 | End: 2020-04-22

## 2020-04-22 RX ORDER — BLOOD SUGAR DIAGNOSTIC
STRIP MISCELLANEOUS
Qty: 450 EACH | Refills: 0 | Status: SHIPPED | OUTPATIENT
Start: 2020-04-22

## 2020-04-22 NOTE — PATIENT INSTRUCTIONS
We are here to support you with Diabetes but please remember that you still need your primary care doctor for your routine health maintenance. Your current A1C: 16.2%  This test provides us with your average blood sugar for the past 3 months.    The main for review      Blood sugar targets:  Before breakfast:   (preferably < 110)  2 hours After meals: less than 180 (preferably less than 150)   Call for persistent blood sugars < 75 or > 200.    Blood sugars greater than 200 are not acceptable to reach

## 2020-04-22 NOTE — PROGRESS NOTES
HPI:   Guerline  is a 44year old female for her management of diabetes. This visit is conducted using Telemedicine with live, interactive video and audio due to COVID-19. Patient verbally consents to Telemedicine visit.   Patient understands and acc her maternal cousin female; Cancer in her mother; Diabetes in her father and mother; Hypertension in her father and mother; Thyroid disease in her mother. She  reports that she has quit smoking.  She has never used smokeless tobacco. She reports that she PEN NEEDLE JO-ANN U/F 32G X 4 MM Does not apply Misc, U WITH INSULIN QD    No current facility-administered medications on file prior to visit.        CMP  (most recent labs)   Lab Results   Component Value Date/Time     (H) 04/18/2020 10:53 AM    BUN begin basal/bolus regimen of Tresiba 14 units SC daily and Novolog 4 units + sliding scale: 200-250 mg/dl = 2 units, 251-300 mg/dl = 4 units, 301-350 mg/dl = 6 units, 351-400 mg/dl = 8 units, > 400 mg/dl = 10 units SC tid w/meals.   Glucose toxicity refers REFERRAL DIABETES FULL ED 10 HRS GROUP/IND  -     OP REFERRAL TO DIAB NUTRITION MANAGEMENT 3 HRS  -     Insulin Degludec (TRESIBA FLEXTOUCH) 100 UNIT/ML Subcutaneous Solution Pen-injector;  Inject 14 Units into the skin daily.  -     Insulin Aspart Pen ([place] Comment

## 2020-04-23 NOTE — PROGRESS NOTES
Rigoberto Ramírez  : 1/3/1970 attended Step 1 Diabetic Education:    Date: 2020  Referring Provider: MAYITO Kemp Start time:3:00pm End time: 5:00pm    /83 (BP Location: Left arm, Patient Position: Sitting, Cuff Size: adult)   Wt 214 lb 12.8 oz 24 hr period of time w/decreased risk for hypoglycemia. May not be mixed with other insulins. Requires injection at the same time every day.   Type/Dose/Schedule:Novolog 4 units tid w/meals + scale (200-250 mg/dl = 2 units, 251-300 mg/dl = 4 units, 301-350 W/RN,CDE  Attend Step 2 Class when restarted    Prescriptions sent to preferred pharmacy for test strips and lancets per protocol. Written materials provided for all areas covered.     Patient verbalized understanding and has no further questions at this t

## 2020-04-24 ENCOUNTER — TELEPHONE (OUTPATIENT)
Dept: ENDOCRINOLOGY CLINIC | Facility: CLINIC | Age: 50
End: 2020-04-24

## 2020-04-28 ENCOUNTER — TELEPHONE (OUTPATIENT)
Dept: ENDOCRINOLOGY CLINIC | Facility: CLINIC | Age: 50
End: 2020-04-28

## 2020-04-28 ENCOUNTER — TELEMEDICINE (OUTPATIENT)
Dept: ENDOCRINOLOGY CLINIC | Facility: CLINIC | Age: 50
End: 2020-04-28

## 2020-04-28 DIAGNOSIS — Z79.4 TYPE 2 DIABETES MELLITUS WITH HYPERGLYCEMIA, WITH LONG-TERM CURRENT USE OF INSULIN (HCC): ICD-10-CM

## 2020-04-28 DIAGNOSIS — E11.65 TYPE 2 DIABETES MELLITUS WITH HYPERGLYCEMIA, WITH LONG-TERM CURRENT USE OF INSULIN (HCC): ICD-10-CM

## 2020-04-28 PROCEDURE — 97802 MEDICAL NUTRITION INDIV IN: CPT | Performed by: DIETITIAN, REGISTERED

## 2020-04-28 NOTE — PROGRESS NOTES
Damien Stanley   1/3/1970 was seen for Diabetic Medical Nutrition Therapy:    4/28/2020  Referring Provider: Sandra Marie  Start time: 11:00 End time: 12:00    Video call diabetes education:    Pt verbally consents to a Virtual audio-video consultation michelle insulin resistance and tools to treat: diet, regular physical activity, diabetes medications, and stress management.     Also discussed natural progression of Type 2 diabetes and ways to slow progression: regular physical activity, good blood glucose contro

## 2020-04-29 ENCOUNTER — TELEMEDICINE (OUTPATIENT)
Dept: ENDOCRINOLOGY CLINIC | Facility: CLINIC | Age: 50
End: 2020-04-29

## 2020-04-29 DIAGNOSIS — E11.65 TYPE 2 DIABETES MELLITUS WITH HYPERGLYCEMIA, WITH LONG-TERM CURRENT USE OF INSULIN (HCC): Primary | ICD-10-CM

## 2020-04-29 DIAGNOSIS — Z79.4 TYPE 2 DIABETES MELLITUS WITH HYPERGLYCEMIA, WITH LONG-TERM CURRENT USE OF INSULIN (HCC): Primary | ICD-10-CM

## 2020-04-29 NOTE — PROGRESS NOTES
Attempted to contact patient for scheduled Video visit, call sent to . Left message for patient to call back to reschedule visit, preferably for this week.     Gianna KUHN,CDE

## 2020-05-01 ENCOUNTER — TELEMEDICINE (OUTPATIENT)
Dept: ENDOCRINOLOGY CLINIC | Facility: CLINIC | Age: 50
End: 2020-05-01
Payer: COMMERCIAL

## 2020-05-01 DIAGNOSIS — Z79.4 TYPE 2 DIABETES MELLITUS WITH HYPERGLYCEMIA, WITH LONG-TERM CURRENT USE OF INSULIN (HCC): Primary | ICD-10-CM

## 2020-05-01 DIAGNOSIS — E11.65 TYPE 2 DIABETES MELLITUS WITH HYPERGLYCEMIA, WITH LONG-TERM CURRENT USE OF INSULIN (HCC): Primary | ICD-10-CM

## 2020-05-01 PROCEDURE — 99214 OFFICE O/P EST MOD 30 MIN: CPT | Performed by: NURSE PRACTITIONER

## 2020-05-01 RX ORDER — OMEPRAZOLE 40 MG/1
CAPSULE, DELAYED RELEASE ORAL AS NEEDED
Status: ON HOLD | COMMUNITY
End: 2021-01-20

## 2020-05-01 RX ORDER — FLUTICASONE PROPIONATE 50 MCG
SPRAY, SUSPENSION (ML) NASAL
COMMUNITY
Start: 2014-07-21 | End: 2020-11-17

## 2020-05-01 NOTE — PROGRESS NOTES
HPI:   Aga Coffey is a 44year old female for her management of diabetes. This visit is conducted using Telemedicine with live audio due to COVID-19. Patient verbally consents to Telemedicine visit.   Patient understands and accepts financial responsi never used smokeless tobacco. She reports that she does not drink alcohol or use drugs. She is allergic to eggs or egg-derived products; peanuts [peanut oil]; pineapple; and walnuts.    Fluticasone Propionate (FLONASE ALLERGY RELIEF) 50 MCG/ACT Nasal Vinson not apply Kit, 2 (two) times daily. Glucose Blood (ONETOUCH VERIO) In Vitro Strip, Use to check blood sugars twice a day before meals. ONETOUCH DELICA LANCETS 52B Does not apply Misc, 1 each by Does not apply route 2 (two) times daily before meals.   Acet Lab results reviewed.     REVIEW OF SYSTEMS:   GENERAL HEALTH: feels well otherwise  SKIN: denies any unusual skin lesions or rashes  RESPIRATORY: denies shortness of breath with exertion  CARDIOVASCULAR: denies chest pain on exertion  GI: denies abdomi on 01/03/1987    The patient indicates understanding of these issues and agrees to the plan. Refills addressed at time of office visit.     Diagnoses and all orders for this visit:    Type 2 diabetes mellitus with hyperglycemia, with long-term current use

## 2020-05-06 ENCOUNTER — TELEPHONE (OUTPATIENT)
Dept: ENDOCRINOLOGY CLINIC | Facility: CLINIC | Age: 50
End: 2020-05-06

## 2020-05-06 NOTE — TELEPHONE ENCOUNTER
Contacted pt. regarding her question about when to take or hold mealtime insulin. She just checked her BG & it was 102; she thought she should hold it because the level was too low.  I explained that she would need to hold the insulin if her BG level was 70

## 2020-05-14 ENCOUNTER — TELEMEDICINE (OUTPATIENT)
Dept: ENDOCRINOLOGY CLINIC | Facility: CLINIC | Age: 50
End: 2020-05-14

## 2020-05-14 DIAGNOSIS — E11.29 UNCONTROLLED TYPE 2 DIABETES MELLITUS WITH MICROALBUMINURIA, WITHOUT LONG-TERM CURRENT USE OF INSULIN (HCC): Primary | ICD-10-CM

## 2020-05-14 DIAGNOSIS — E11.65 UNCONTROLLED TYPE 2 DIABETES MELLITUS WITH MICROALBUMINURIA, WITHOUT LONG-TERM CURRENT USE OF INSULIN (HCC): Primary | ICD-10-CM

## 2020-05-14 DIAGNOSIS — R80.9 UNCONTROLLED TYPE 2 DIABETES MELLITUS WITH MICROALBUMINURIA, WITHOUT LONG-TERM CURRENT USE OF INSULIN (HCC): Primary | ICD-10-CM

## 2020-05-14 PROCEDURE — 97803 MED NUTRITION INDIV SUBSEQ: CPT | Performed by: DIETITIAN, REGISTERED

## 2020-05-14 NOTE — PROGRESS NOTES
Diabetes Education Medical Nutrition Therapy Follow-up Note    Referring Provider: Najma Abbott Start time: 3:00  End time: 3:45    Due to COVID-19 ACTION PLAN, the patient's office visit was conducted via video.      The patient verbally consents to an aud 150, 118, 138, 140   Post-D 130 x 2    Hypoglycemia?: denies      Follow-up Plan: Step 1 next week. Emailed her step 1 folder contents.     Yanelis Boyd MS RD LDN CDE

## 2020-05-18 ENCOUNTER — TELEMEDICINE (OUTPATIENT)
Dept: ENDOCRINOLOGY CLINIC | Facility: CLINIC | Age: 50
End: 2020-05-18

## 2020-05-18 DIAGNOSIS — Z79.4 TYPE 2 DIABETES MELLITUS WITH HYPERGLYCEMIA, WITH LONG-TERM CURRENT USE OF INSULIN (HCC): Primary | ICD-10-CM

## 2020-05-18 DIAGNOSIS — E11.65 TYPE 2 DIABETES MELLITUS WITH HYPERGLYCEMIA, WITH LONG-TERM CURRENT USE OF INSULIN (HCC): Primary | ICD-10-CM

## 2020-05-18 PROCEDURE — 95250 CONT GLUC MNTR PHYS/QHP EQP: CPT | Performed by: NURSE PRACTITIONER

## 2020-05-18 PROCEDURE — 99214 OFFICE O/P EST MOD 30 MIN: CPT | Performed by: NURSE PRACTITIONER

## 2020-05-18 NOTE — PROGRESS NOTES
HPI:   Rigoberto Ramíerz is a 44year old female for her management of diabetes. This visit is conducted using Telemedicine with live, interactive audio and video using Doximity due to COVID-19. Patient verbally consents to Telemedicine visit.   Patient unde father and mother; Thyroid disease in her mother. She  reports that she quit smoking about 18 years ago. Her smoking use included cigarettes. She smoked 0.00 packs per day for 5.00 years.  She has never used smokeless tobacco. She reports that she does no 763251 UNIT/GM External Powder, Apply 1 Application topically 3 (three) times daily. fluconazole (DIFLUCAN) 150 MG Oral Tab, Take 1 tablet by mouth now. May repeat in 72 hrs if symptoms persist. Please call office if not resolved after 2 doses.   Blood Glu CO2 24.0 04/18/2020 10:53 AM           Lipids  (most recent labs)   Lab Results   Component Value Date/Time    CHOLEST 171 04/18/2020 07:37 AM    TRIG 382 (H) 04/18/2020 07:37 AM    HDL 30 (L) 04/18/2020 07:37 AM    LDL 65 04/18/2020 07:37 AM    NONHDLC 14 regimen. Discussed action, risk vs benefit, dosing, and potential side effects. Patient denies hx of pancreatitis, gastroparesis, or personal or family hx of medullary thyroid CA or MEN 2. Will discuss further during 2 week follow up.   A continuous gluc

## 2020-06-02 NOTE — PROGRESS NOTES
A continuous glucose sensor for 24 hour blood sugar monitoring was placed on patient today per APN order.    Serial NUMBER: [id number]  Exp date: (5/31/20)  - After cleansing skin with alcohol prep, Sensor (F95)was inserted on  RIGHT Posterior upper arm ar

## 2020-06-03 ENCOUNTER — TELEMEDICINE (OUTPATIENT)
Dept: ENDOCRINOLOGY CLINIC | Facility: CLINIC | Age: 50
End: 2020-06-03

## 2020-06-03 DIAGNOSIS — Z79.4 TYPE 2 DIABETES MELLITUS WITH HYPERGLYCEMIA, WITH LONG-TERM CURRENT USE OF INSULIN (HCC): Primary | ICD-10-CM

## 2020-06-03 DIAGNOSIS — E11.65 TYPE 2 DIABETES MELLITUS WITH HYPERGLYCEMIA, WITH LONG-TERM CURRENT USE OF INSULIN (HCC): Primary | ICD-10-CM

## 2020-06-03 PROCEDURE — G0108 DIAB MANAGE TRN  PER INDIV: HCPCS | Performed by: DIETITIAN, REGISTERED

## 2020-06-03 NOTE — PROGRESS NOTES
Nigel Salcedo   1/3/1970 attended Step 1 Diabetic Education:     6/3/2020  Referring Provider: Margo Hamilton Start time: 11:00 End time: 11:30    Due to COVID-19 ACTION PLAN, the patient's office visit was conducted via video.      The patient verbally conse  2 Hour Post-prandial 140-180  FBS's 112-130's; post-p's 120's can be up to 165-170 when she \"overdoes it\".     Problem Solving: Prevention,detection and treatment of acute complications: taught symptoms of hypoglycemia, hyperglycemia, how to treat

## 2020-06-08 DIAGNOSIS — E66.9 DIABETES MELLITUS TYPE 2 IN OBESE (HCC): Primary | ICD-10-CM

## 2020-06-08 DIAGNOSIS — E11.69 DIABETES MELLITUS TYPE 2 IN OBESE (HCC): Primary | ICD-10-CM

## 2020-06-08 RX ORDER — CALCIUM CITRATE/VITAMIN D3 200MG-6.25
TABLET ORAL
Qty: 400 STRIP | Refills: 1 | Status: SHIPPED | OUTPATIENT
Start: 2020-06-08 | End: 2021-06-06

## 2020-06-08 NOTE — TELEPHONE ENCOUNTER
Spoke with pt. She has been out of test strips for a few days. She uses True Metrix strips now. Testing 4 times daily  Pt requesting repeat lab orders. Last labs 4/18/2020. Pended.

## 2020-06-10 ENCOUNTER — TELEPHONE (OUTPATIENT)
Dept: ENDOCRINOLOGY CLINIC | Facility: CLINIC | Age: 50
End: 2020-06-10

## 2020-06-10 NOTE — TELEPHONE ENCOUNTER
Pt has a professional Karen on and was suppose to have it removed before visit. Left several messages and no response.  Per [facility] reschedule her for in office appt

## 2020-06-11 ENCOUNTER — PATIENT MESSAGE (OUTPATIENT)
Dept: FAMILY MEDICINE CLINIC | Facility: CLINIC | Age: 50
End: 2020-06-11

## 2020-06-12 RX ORDER — BLOOD-GLUCOSE METER
1 EACH MISCELLANEOUS
Qty: 1 KIT | Refills: 0 | Status: SHIPPED | OUTPATIENT
Start: 2020-06-12

## 2020-06-21 DIAGNOSIS — F41.1 GENERALIZED ANXIETY DISORDER: ICD-10-CM

## 2020-06-21 DIAGNOSIS — E11.29 UNCONTROLLED TYPE 2 DIABETES MELLITUS WITH MICROALBUMINURIA, WITHOUT LONG-TERM CURRENT USE OF INSULIN (HCC): ICD-10-CM

## 2020-06-21 DIAGNOSIS — E11.65 UNCONTROLLED TYPE 2 DIABETES MELLITUS WITH MICROALBUMINURIA, WITHOUT LONG-TERM CURRENT USE OF INSULIN (HCC): ICD-10-CM

## 2020-06-21 DIAGNOSIS — R80.9 UNCONTROLLED TYPE 2 DIABETES MELLITUS WITH MICROALBUMINURIA, WITHOUT LONG-TERM CURRENT USE OF INSULIN (HCC): ICD-10-CM

## 2020-06-22 RX ORDER — ALPRAZOLAM 0.5 MG/1
0.5 TABLET ORAL 3 TIMES DAILY PRN
Qty: 60 TABLET | Refills: 0 | Status: SHIPPED | OUTPATIENT
Start: 2020-06-22 | End: 2020-07-29

## 2020-06-22 RX ORDER — LEVOTHYROXINE SODIUM 88 UG/1
88 TABLET ORAL
Qty: 90 TABLET | Refills: 0 | Status: SHIPPED | OUTPATIENT
Start: 2020-06-22 | End: 2020-09-28

## 2020-06-22 NOTE — TELEPHONE ENCOUNTER
Last televisit: 4/17/2020 med check  Pt was asked to return in 1-2 months but she has started seeing diabetic specialist since then. Does she still need follow up with you already? Or OK to refill?

## 2020-07-26 ENCOUNTER — HOSPITAL ENCOUNTER (EMERGENCY)
Facility: HOSPITAL | Age: 50
Discharge: HOME | End: 2020-07-26
Attending: EMERGENCY MEDICINE
Payer: COMMERCIAL

## 2020-07-26 VITALS
OXYGEN SATURATION: 95 % | WEIGHT: 200 LBS | BODY MASS INDEX: 32.92 KG/M2 | HEART RATE: 68 BPM | HEIGHT: 65.5 IN | RESPIRATION RATE: 21 BRPM | DIASTOLIC BLOOD PRESSURE: 73 MMHG | SYSTOLIC BLOOD PRESSURE: 117 MMHG | TEMPERATURE: 98 F

## 2020-07-26 DIAGNOSIS — I47.1 SVT (SUPRAVENTRICULAR TACHYCARDIA) (HCC): Primary | ICD-10-CM

## 2020-07-26 LAB
ALBUMIN SERPL-MCNC: 3.6 G/DL (ref 3.4–5)
ALBUMIN/GLOB SERPL: 0.8 {RATIO} (ref 1–2)
ALP LIVER SERPL-CCNC: 102 U/L (ref 41–108)
ALT SERPL-CCNC: 30 U/L (ref 13–56)
ANION GAP SERPL CALC-SCNC: 3 MMOL/L (ref 0–18)
AST SERPL-CCNC: 32 U/L (ref 15–37)
BASOPHILS # BLD AUTO: 0.05 X10(3) UL (ref 0–0.2)
BASOPHILS NFR BLD AUTO: 0.5 %
BILIRUB SERPL-MCNC: 0.3 MG/DL (ref 0.1–2)
BUN BLD-MCNC: 12 MG/DL (ref 7–18)
BUN/CREAT SERPL: 15.2 (ref 10–20)
CALCIUM BLD-MCNC: 8.8 MG/DL (ref 8.5–10.1)
CHLORIDE SERPL-SCNC: 103 MMOL/L (ref 98–112)
CO2 SERPL-SCNC: 29 MMOL/L (ref 21–32)
CREAT BLD-MCNC: 0.79 MG/DL (ref 0.55–1.02)
DEPRECATED RDW RBC AUTO: 41.1 FL (ref 35.1–46.3)
EOSINOPHIL # BLD AUTO: 0.35 X10(3) UL (ref 0–0.7)
EOSINOPHIL NFR BLD AUTO: 3.8 %
ERYTHROCYTE [DISTWIDTH] IN BLOOD BY AUTOMATED COUNT: 13.7 % (ref 11–15)
GLOBULIN PLAS-MCNC: 4.6 G/DL (ref 2.8–4.4)
GLUCOSE BLD-MCNC: 176 MG/DL (ref 70–99)
GLUCOSE BLD-MCNC: 180 MG/DL (ref 70–99)
HCT VFR BLD AUTO: 41.9 % (ref 35–48)
HGB BLD-MCNC: 13.2 G/DL (ref 12–16)
IMM GRANULOCYTES # BLD AUTO: 0.01 X10(3) UL (ref 0–1)
IMM GRANULOCYTES NFR BLD: 0.1 %
LYMPHOCYTES # BLD AUTO: 2.86 X10(3) UL (ref 1–4)
LYMPHOCYTES NFR BLD AUTO: 30.9 %
M PROTEIN MFR SERPL ELPH: 8.2 G/DL (ref 6.4–8.2)
MCH RBC QN AUTO: 26.2 PG (ref 26–34)
MCHC RBC AUTO-ENTMCNC: 31.5 G/DL (ref 31–37)
MCV RBC AUTO: 83.3 FL (ref 80–100)
MONOCYTES # BLD AUTO: 1.25 X10(3) UL (ref 0.1–1)
MONOCYTES NFR BLD AUTO: 13.5 %
NEUTROPHILS # BLD AUTO: 4.73 X10 (3) UL (ref 1.5–7.7)
NEUTROPHILS # BLD AUTO: 4.73 X10(3) UL (ref 1.5–7.7)
NEUTROPHILS NFR BLD AUTO: 51.2 %
OSMOLALITY SERPL CALC.SUM OF ELEC: 284 MOSM/KG (ref 275–295)
PLATELET # BLD AUTO: 284 10(3)UL (ref 150–450)
POTASSIUM SERPL-SCNC: 3.9 MMOL/L (ref 3.5–5.1)
RBC # BLD AUTO: 5.03 X10(6)UL (ref 3.8–5.3)
SODIUM SERPL-SCNC: 135 MMOL/L (ref 136–145)
TROPONIN I SERPL-MCNC: <0.045 NG/ML (ref ?–0.04)
WBC # BLD AUTO: 9.3 X10(3) UL (ref 4–11)

## 2020-07-26 PROCEDURE — 93010 ELECTROCARDIOGRAM REPORT: CPT

## 2020-07-26 PROCEDURE — 85025 COMPLETE CBC W/AUTO DIFF WBC: CPT | Performed by: EMERGENCY MEDICINE

## 2020-07-26 PROCEDURE — 99285 EMERGENCY DEPT VISIT HI MDM: CPT

## 2020-07-26 PROCEDURE — 80053 COMPREHEN METABOLIC PANEL: CPT | Performed by: EMERGENCY MEDICINE

## 2020-07-26 PROCEDURE — 93005 ELECTROCARDIOGRAM TRACING: CPT

## 2020-07-26 PROCEDURE — 96374 THER/PROPH/DIAG INJ IV PUSH: CPT

## 2020-07-26 PROCEDURE — 84484 ASSAY OF TROPONIN QUANT: CPT | Performed by: EMERGENCY MEDICINE

## 2020-07-26 PROCEDURE — 96375 TX/PRO/DX INJ NEW DRUG ADDON: CPT

## 2020-07-26 PROCEDURE — 99284 EMERGENCY DEPT VISIT MOD MDM: CPT

## 2020-07-26 PROCEDURE — 82962 GLUCOSE BLOOD TEST: CPT

## 2020-07-26 RX ORDER — ONDANSETRON 2 MG/ML
4 INJECTION INTRAMUSCULAR; INTRAVENOUS ONCE
Status: COMPLETED | OUTPATIENT
Start: 2020-07-26 | End: 2020-07-26

## 2020-07-26 RX ORDER — ONDANSETRON 2 MG/ML
INJECTION INTRAMUSCULAR; INTRAVENOUS
Status: DISCONTINUED
Start: 2020-07-26 | End: 2020-07-26

## 2020-07-26 RX ORDER — ADENOSINE 3 MG/ML
6 INJECTION, SOLUTION INTRAVENOUS ONCE
Status: COMPLETED | OUTPATIENT
Start: 2020-07-26 | End: 2020-07-26

## 2020-07-26 RX ORDER — ADENOSINE 3 MG/ML
INJECTION, SOLUTION INTRAVENOUS
Status: COMPLETED
Start: 2020-07-26 | End: 2020-07-26

## 2020-07-26 RX ORDER — ADENOSINE 3 MG/ML
12 INJECTION, SOLUTION INTRAVENOUS ONCE
Status: COMPLETED | OUTPATIENT
Start: 2020-07-26 | End: 2020-07-26

## 2020-07-26 NOTE — ED INITIAL ASSESSMENT (HPI)
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

## 2020-07-26 NOTE — ED PROVIDER NOTES
Patient Seen in: [facility] Emergency Department      History   Patient presents with:  Arrythmia/Palpitations    Stated Complaint: heart racing     HPI    The patient is a 53-year-old female PCT from upstairs, with a history of SVT in the past, who Resp 16   Temp 98 °F (36.7 °C)   Temp src    SpO2 98 %   O2 Device None (Room air)       Current:/76   Pulse 80   Temp 98 °F (36.7 °C)   Resp 22   Ht 166.4 cm (5' 5.5\")   Wt 90.7 kg   SpO2 94%   BMI 32.78 kg/m²         Physical Exam  General: Comf PLATELET    Narrative: The following orders were created for panel order CBC WITH DIFFERENTIAL WITH PLATELET.   Procedure                               Abnormality         Status                     ---------                               ----------- states that she was already planning on seeing him, and he has recommended ablation in the past.  Encouraged her to return if she develops any worrisome symptoms or otherwise she is to call his office tomorrow morning for close outpatient follow-up.   She f

## 2020-07-27 LAB
ATRIAL RATE: 178 BPM
ATRIAL RATE: 87 BPM
P AXIS: 49 DEGREES
P-R INTERVAL: 138 MS
Q-T INTERVAL: 268 MS
Q-T INTERVAL: 396 MS
QRS DURATION: 82 MS
QRS DURATION: 90 MS
QTC CALCULATION (BEZET): 464 MS
QTC CALCULATION (BEZET): 476 MS
R AXIS: 69 DEGREES
R AXIS: 84 DEGREES
T AXIS: -60 DEGREES
T AXIS: 44 DEGREES
VENTRICULAR RATE: 180 BPM
VENTRICULAR RATE: 87 BPM

## 2020-07-29 DIAGNOSIS — F41.1 GENERALIZED ANXIETY DISORDER: ICD-10-CM

## 2020-07-29 DIAGNOSIS — E11.65 UNCONTROLLED TYPE 2 DIABETES MELLITUS WITH MICROALBUMINURIA, WITHOUT LONG-TERM CURRENT USE OF INSULIN (HCC): ICD-10-CM

## 2020-07-29 DIAGNOSIS — R80.9 UNCONTROLLED TYPE 2 DIABETES MELLITUS WITH MICROALBUMINURIA, WITHOUT LONG-TERM CURRENT USE OF INSULIN (HCC): ICD-10-CM

## 2020-07-29 DIAGNOSIS — E11.29 UNCONTROLLED TYPE 2 DIABETES MELLITUS WITH MICROALBUMINURIA, WITHOUT LONG-TERM CURRENT USE OF INSULIN (HCC): ICD-10-CM

## 2020-07-30 RX ORDER — ALPRAZOLAM 0.5 MG/1
0.5 TABLET ORAL 3 TIMES DAILY PRN
Qty: 60 TABLET | Refills: 0 | Status: SHIPPED | OUTPATIENT
Start: 2020-07-30 | End: 2020-10-20

## 2020-09-28 RX ORDER — LEVOTHYROXINE SODIUM 88 UG/1
TABLET ORAL
Qty: 90 TABLET | Refills: 0 | Status: SHIPPED | OUTPATIENT
Start: 2020-09-28 | End: 2020-09-30

## 2020-09-30 RX ORDER — LEVOTHYROXINE SODIUM 88 UG/1
TABLET ORAL
Qty: 90 TABLET | Refills: 0 | Status: SHIPPED | OUTPATIENT
Start: 2020-09-30 | End: 2020-12-17

## 2020-10-20 DIAGNOSIS — F41.1 GENERALIZED ANXIETY DISORDER: ICD-10-CM

## 2020-10-20 DIAGNOSIS — N90.4 LICHEN SCLEROSUS ET ATROPHICUS OF THE VULVA: ICD-10-CM

## 2020-10-20 RX ORDER — ALPRAZOLAM 0.5 MG/1
0.5 TABLET ORAL 3 TIMES DAILY PRN
Qty: 60 TABLET | Refills: 0 | Status: SHIPPED | OUTPATIENT
Start: 2020-10-20 | End: 2020-12-17

## 2020-10-21 RX ORDER — NYSTATIN 100000 [USP'U]/G
1 POWDER TOPICAL 3 TIMES DAILY
Qty: 60 G | Refills: 0 | Status: SHIPPED | OUTPATIENT
Start: 2020-10-21 | End: 2021-06-06

## 2020-10-21 RX ORDER — CLOBETASOL PROPIONATE 0.5 MG/G
1 OINTMENT TOPICAL 2 TIMES DAILY
Qty: 60 G | Refills: 0 | Status: SHIPPED | OUTPATIENT
Start: 2020-10-21 | End: 2021-04-16

## 2020-11-10 ENCOUNTER — PATIENT MESSAGE (OUTPATIENT)
Dept: FAMILY MEDICINE CLINIC | Facility: CLINIC | Age: 50
End: 2020-11-10

## 2020-11-10 NOTE — TELEPHONE ENCOUNTER
From: Kuldeep Wilks  To: Lexy ZAMORA DO  Sent: 11/10/2020 9:59 AM CST  Subject: Prescription Question    Hello. I have had a sour stomach for two days. I go to use the bathroom and its diarrhea, then I go again and I'm constipated.  I have gas pain

## 2020-11-10 NOTE — TELEPHONE ENCOUNTER
Pt. Notified to go to the [address] [place] [address] for evaluation. Pt stated \" I will go to [facility]. \"

## 2020-11-10 NOTE — TELEPHONE ENCOUNTER
Pt made chicken soup Sunday night. After she had been eating it her  noticed the chicken was still raw in the soup. On Monday morning she started with nausea, abdominal pain and diarrhea. She feels very gassy and bloated. She has no fever.  She is ti

## 2020-11-11 ENCOUNTER — TELEPHONE (OUTPATIENT)
Dept: FAMILY MEDICINE CLINIC | Facility: CLINIC | Age: 50
End: 2020-11-11

## 2020-11-11 NOTE — TELEPHONE ENCOUNTER
Seen at [facility] yesterday for abdl pain   No acute process seen per imaging  But suggestive of uterine fibroids  Was told she was constipated  Also to f/u with GI  Been drinking lots of fluids   Taking Miralax  Tylenol not helping   Had small BM yesterday  Stil

## 2020-11-11 NOTE — TELEPHONE ENCOUNTER
Call to pt-advised of dr Elaine Mena comments/recommendations noted below. miralax per pkg instructions daily and colace per pkg instructions. Explained rationale and all info to pt and carroll/son-listed on hipaa consent.  If any difficulty locating miralax or col

## 2020-11-11 NOTE — TELEPHONE ENCOUNTER
For constipation, start with miralax + colace and if no improvement after 1-2 days, switch to magnesium citrate + colace.   Follow up with GI otherwise if symptoms persist.

## 2020-11-13 ENCOUNTER — TELEPHONE (OUTPATIENT)
Dept: OBGYN CLINIC | Facility: CLINIC | Age: 50
End: 2020-11-13

## 2020-11-13 NOTE — TELEPHONE ENCOUNTER
PC with patient. Went to immediate care for abdominal cramping/pain. Was constipated. Better now. Had CT done. Fibroid seen on result. Wondering if pain due to fibroid? Told she would need to make appointment to discuss with provider. No bleeding. Janelle Cage

## 2020-11-13 NOTE — TELEPHONE ENCOUNTER
----- Message from Abdias Arias sent at 11/12/2020  9:38 PM CST -----  Regarding: Test Results Question  Contact: [phone]  Hi. I had a CAT scan done at [facility] recently and was wondering if my abdominal pain is due to fibroids ?  I was referre

## 2020-11-16 ENCOUNTER — TELEPHONE (OUTPATIENT)
Dept: FAMILY MEDICINE CLINIC | Facility: CLINIC | Age: 50
End: 2020-11-16

## 2020-11-16 NOTE — TELEPHONE ENCOUNTER
She was seen at immediate care on 11/10 for abdominal pain. I can't write a letter for her as I did not see her in the office for it. She would need to contact the immediate care for the letter.

## 2020-11-16 NOTE — TELEPHONE ENCOUNTER
Pt would to have a note to give to her work to excuse her from work today and Wednesday. Pt states she is having pain and doesn't believe she can work a 12 hour shift.  Pt was referred to GI and her apt isn't till Wednesday so she is not able to get a note

## 2020-11-17 ENCOUNTER — OFFICE VISIT (OUTPATIENT)
Dept: FAMILY MEDICINE CLINIC | Facility: CLINIC | Age: 50
End: 2020-11-17
Payer: COMMERCIAL

## 2020-11-17 VITALS
HEIGHT: 65.5 IN | BODY MASS INDEX: 40 KG/M2 | RESPIRATION RATE: 16 BRPM | OXYGEN SATURATION: 99 % | TEMPERATURE: 98 F | WEIGHT: 243 LBS | SYSTOLIC BLOOD PRESSURE: 152 MMHG | HEART RATE: 80 BPM | DIASTOLIC BLOOD PRESSURE: 82 MMHG

## 2020-11-17 DIAGNOSIS — R10.84 GENERALIZED ABDOMINAL PAIN: Primary | ICD-10-CM

## 2020-11-17 DIAGNOSIS — R93.5 ABNORMAL CT OF THE ABDOMEN: ICD-10-CM

## 2020-11-17 DIAGNOSIS — R19.4 CHANGE IN BOWEL HABITS: ICD-10-CM

## 2020-11-17 DIAGNOSIS — I10 ELEVATED BLOOD PRESSURE READING IN OFFICE WITH DIAGNOSIS OF HYPERTENSION: ICD-10-CM

## 2020-11-17 DIAGNOSIS — R14.0 ABDOMINAL BLOATING: ICD-10-CM

## 2020-11-17 DIAGNOSIS — R10.9 ABDOMINAL CRAMPING: ICD-10-CM

## 2020-11-17 PROCEDURE — 3079F DIAST BP 80-89 MM HG: CPT | Performed by: NURSE PRACTITIONER

## 2020-11-17 PROCEDURE — 3008F BODY MASS INDEX DOCD: CPT | Performed by: NURSE PRACTITIONER

## 2020-11-17 PROCEDURE — 99072 ADDL SUPL MATRL&STAF TM PHE: CPT | Performed by: NURSE PRACTITIONER

## 2020-11-17 PROCEDURE — 3077F SYST BP >= 140 MM HG: CPT | Performed by: NURSE PRACTITIONER

## 2020-11-17 PROCEDURE — 99214 OFFICE O/P EST MOD 30 MIN: CPT | Performed by: NURSE PRACTITIONER

## 2020-11-17 RX ORDER — DICYCLOMINE HYDROCHLORIDE 10 MG/1
10 CAPSULE ORAL 4 TIMES DAILY
Qty: 40 CAPSULE | Refills: 0 | Status: SHIPPED | OUTPATIENT
Start: 2020-11-17 | End: 2020-11-27

## 2020-11-17 NOTE — PROGRESS NOTES
Jamey Colon is a 44year old female. HPI:   Patient presents today for an [facility] follow up. Patient was seen at [address] [place] [address] on 11/10/2020 for abdominal pain. She had a CT scan which showed: IMPRESSION:  1.  No acute inflammatory changes are fo 60 tablet 0   • LEVOTHYROXINE SODIUM 88 MCG Oral Tab TAKE 1 TABLET(88 MCG) BY MOUTH BEFORE BREAKFAST 90 tablet 0   • metFORMIN HCl 1000 MG Oral Tab Take 1 tablet (1,000 mg total) by mouth 2 (two) times daily with meals.  180 tablet 0   • Metoprolol Succinat thyroid    • Fibroids    • Heavy menses 06/15/2012   • High cholesterol    • History of uterine fibroid 06/17/2014   • Hypothyroid    • PONV (postoperative nausea and vomiting)    • SVT (supraventricular tachycardia) (HCC)    • Type II or unspecified type office with diagnosis of hypertension    No orders of the defined types were placed in this encounter.       Meds & Refills for this Visit:  Requested Prescriptions     Signed Prescriptions Disp Refills   • Dicyclomine HCl 10 MG Oral Cap 40 capsule 0     Si understanding of these issues and agrees to the plan. The patient is asked to return if any new/worsening symptoms.

## 2020-11-17 NOTE — TELEPHONE ENCOUNTER
Call to pt-explained info noted below from dr johns. Confirms she was seen at [facility] on [address] 11/10/2020. sts she requested work note from them but was told note needs to come from PCP.  Gently reinforced work n

## 2020-11-21 ENCOUNTER — OFFICE VISIT (OUTPATIENT)
Dept: OBGYN CLINIC | Facility: CLINIC | Age: 50
End: 2020-11-21
Payer: COMMERCIAL

## 2020-11-21 VITALS
SYSTOLIC BLOOD PRESSURE: 140 MMHG | WEIGHT: 245 LBS | DIASTOLIC BLOOD PRESSURE: 90 MMHG | HEART RATE: 72 BPM | RESPIRATION RATE: 16 BRPM | BODY MASS INDEX: 40 KG/M2

## 2020-11-21 DIAGNOSIS — R10.2 PELVIC PAIN: Primary | ICD-10-CM

## 2020-11-21 DIAGNOSIS — D25.1 FIBROIDS, INTRAMURAL: ICD-10-CM

## 2020-11-21 DIAGNOSIS — L90.0 LICHEN SCLEROSUS: ICD-10-CM

## 2020-11-21 PROCEDURE — 3080F DIAST BP >= 90 MM HG: CPT | Performed by: OBSTETRICS & GYNECOLOGY

## 2020-11-21 PROCEDURE — 99214 OFFICE O/P EST MOD 30 MIN: CPT | Performed by: OBSTETRICS & GYNECOLOGY

## 2020-11-21 PROCEDURE — 3077F SYST BP >= 140 MM HG: CPT | Performed by: OBSTETRICS & GYNECOLOGY

## 2020-11-21 PROCEDURE — 99072 ADDL SUPL MATRL&STAF TM PHE: CPT | Performed by: OBSTETRICS & GYNECOLOGY

## 2020-11-21 NOTE — PROGRESS NOTES
CHIEF COMPLAINT:   Patient presents with  Pelvic pain  HPI:   Rigoberto Ramírez is a 44year old  No LMP recorded. Patient has had an ablation. who presents with intermittent pelvic and abdominal pain for the past few months. The pain comes and goes. ulcerations  cervix      closed, no CMT, no lesions or polyps  uterus      WNL size, NT, mobile,   adnexa     no masses, NT  IMPRESSION/PLAN:     1. Pelvic pain    - US TRANSVAG/ABDOMINAL EMG ONLY; Future    Most likely due to bowel etiology.   Possible IBS

## 2020-12-03 ENCOUNTER — TELEPHONE (OUTPATIENT)
Dept: OBGYN CLINIC | Facility: CLINIC | Age: 50
End: 2020-12-03

## 2020-12-03 DIAGNOSIS — Z86.14 HISTORY OF METHICILLIN RESISTANT STAPHYLOCOCCUS AUREUS (MRSA): Primary | ICD-10-CM

## 2020-12-03 NOTE — TELEPHONE ENCOUNTER
PC with patient. She was diagnosed in the past by our office with MRSA. She is having colonoscopy done on 12/8/2020. They are requiring her to get the MRSA retested and needs to get done with the office it was diagnosed with.  She can go to the lab to get i

## 2020-12-05 ENCOUNTER — LAB ENCOUNTER (OUTPATIENT)
Dept: LAB | Facility: HOSPITAL | Age: 50
End: 2020-12-05
Attending: OBSTETRICS & GYNECOLOGY
Payer: COMMERCIAL

## 2020-12-05 ENCOUNTER — APPOINTMENT (OUTPATIENT)
Dept: LAB | Facility: HOSPITAL | Age: 50
End: 2020-12-05
Attending: INTERNAL MEDICINE
Payer: COMMERCIAL

## 2020-12-05 DIAGNOSIS — Z86.14 HISTORY OF METHICILLIN RESISTANT STAPHYLOCOCCUS AUREUS (MRSA): ICD-10-CM

## 2020-12-05 DIAGNOSIS — Z01.818 PRE-OPERATIVE CLEARANCE: ICD-10-CM

## 2020-12-05 DIAGNOSIS — Z12.11 COLON CANCER SCREENING: ICD-10-CM

## 2020-12-05 DIAGNOSIS — Z11.59 SPECIAL SCREENING EXAMINATION FOR VIRAL DISEASE: ICD-10-CM

## 2020-12-05 PROCEDURE — 87081 CULTURE SCREEN ONLY: CPT

## 2020-12-07 ENCOUNTER — TELEMEDICINE (OUTPATIENT)
Dept: FAMILY MEDICINE CLINIC | Facility: CLINIC | Age: 50
End: 2020-12-07
Payer: COMMERCIAL

## 2020-12-07 ENCOUNTER — ULTRASOUND ENCOUNTER (OUTPATIENT)
Dept: OBGYN CLINIC | Facility: CLINIC | Age: 50
End: 2020-12-07
Payer: COMMERCIAL

## 2020-12-07 DIAGNOSIS — Z22.322 MRSA NASAL COLONIZATION: Primary | ICD-10-CM

## 2020-12-07 PROCEDURE — 99214 OFFICE O/P EST MOD 30 MIN: CPT | Performed by: FAMILY MEDICINE

## 2020-12-07 NOTE — PROGRESS NOTES
Subjective     HPI:     Telehealth outside of [address] Verbal Consent   I conducted a telehealth visit with Luna Valentin today, 12/07/20, which was completed using two-way, real-time interactive audio and video communication.  This has been done on her MRSA nasal culture for preop testing. States she was told to follow-up with her primary care physician for treatment. She has no symptoms.     No Further Nursing Notes to Review  Tobacco Reviewed  Allergies Reviewed    Medications Reviewed  Problem

## 2020-12-11 ENCOUNTER — TELEPHONE (OUTPATIENT)
Dept: OBGYN CLINIC | Facility: CLINIC | Age: 50
End: 2020-12-11

## 2020-12-11 DIAGNOSIS — R93.89 THICKENED ENDOMETRIUM: Primary | ICD-10-CM

## 2020-12-11 DIAGNOSIS — Z22.322 MRSA (METHICILLIN RESISTANT STAPH AUREUS) CULTURE POSITIVE: ICD-10-CM

## 2020-12-11 NOTE — TELEPHONE ENCOUNTER
Patient has fibroids and wants to know what the next step will be   Please this number   [phone]

## 2020-12-11 NOTE — TELEPHONE ENCOUNTER
PC with patient. Aware of ultrasound result. Call if bleeding. Repeat ultrasound in 3 months to re evaluate uterine lining. States her colonoscopy has been rescheduled for end of month. She needs a repeat MRSA done before hand.  PCP told her to have us ord

## 2020-12-14 ENCOUNTER — TELEPHONE (OUTPATIENT)
Dept: ENDOCRINOLOGY CLINIC | Facility: CLINIC | Age: 50
End: 2020-12-14

## 2020-12-14 NOTE — TELEPHONE ENCOUNTER
Called patient and left message regarding scheduling an appointment for diabetes provider follow up from A1c list. Instructed patient to call back to schedule an appointment. Had been seen last 6/10/2020 virtually with MAYITO Quintanilla.

## 2020-12-17 DIAGNOSIS — E11.65 UNCONTROLLED TYPE 2 DIABETES MELLITUS WITH MICROALBUMINURIA, WITHOUT LONG-TERM CURRENT USE OF INSULIN (HCC): ICD-10-CM

## 2020-12-17 DIAGNOSIS — R80.9 UNCONTROLLED TYPE 2 DIABETES MELLITUS WITH MICROALBUMINURIA, WITHOUT LONG-TERM CURRENT USE OF INSULIN (HCC): ICD-10-CM

## 2020-12-17 DIAGNOSIS — F41.1 GENERALIZED ANXIETY DISORDER: ICD-10-CM

## 2020-12-17 DIAGNOSIS — E11.29 UNCONTROLLED TYPE 2 DIABETES MELLITUS WITH MICROALBUMINURIA, WITHOUT LONG-TERM CURRENT USE OF INSULIN (HCC): ICD-10-CM

## 2020-12-18 RX ORDER — LEVOTHYROXINE SODIUM 88 UG/1
88 TABLET ORAL
Qty: 90 TABLET | Refills: 0 | Status: SHIPPED | OUTPATIENT
Start: 2020-12-18 | End: 2021-03-17

## 2020-12-18 RX ORDER — ALPRAZOLAM 0.5 MG/1
0.5 TABLET ORAL 3 TIMES DAILY PRN
Qty: 60 TABLET | Refills: 1 | Status: SHIPPED | OUTPATIENT
Start: 2020-12-18 | End: 2021-03-27

## 2020-12-18 NOTE — TELEPHONE ENCOUNTER
Alprazolam is not a protocol medication, last OV 8/14/20 last refill 10/20/20 #60. Please review and refill if appropriate.

## 2020-12-29 PROCEDURE — 88305 TISSUE EXAM BY PATHOLOGIST: CPT | Performed by: INTERNAL MEDICINE

## 2021-01-07 NOTE — H&P (VIEW-ONLY)
Charmayne More is a 46year old female. Patient presents with:  Consult: Self-referred; Cecal mass        Unknown Pcp    HPI:  Patient underwent a screening colonoscopy and was found to have multiple polyps.  All the polyps were removed except a large polyp Performed by Yao Reis MD at [facility] MAIN OR     Family History   Problem Relation Age of Onset   • Hypertension Father    • Diabetes Father    • Hypertension Mother    • Diabetes Mother    • Cancer Mother    • Thyroid disease Mother         Hypothyroid 0.05 % External Ointment Apply 1 Application topically 2 (two) times daily. 60 g 0   • Nystatin 923111 UNIT/GM External Powder Apply 1 Application topically 3 (three) times daily.  60 g 0   • Metoprolol Succinate ER 50 MG Oral Tablet 24 Hr Take 1 tablet (50 fundi normal, there is no nystagmus  HEENT: normocephalic; normal nose, pharynx and TM's  NECK: supple; FROM; no JVD, no TMG, no carotid bruits  BREASTS: deferred  RESPIRATORY: clear to percussion and auscultation  CARDIOVASCULAR: S1, S2 normal, RRR; no S3

## 2021-01-09 ENCOUNTER — HOSPITAL ENCOUNTER (OUTPATIENT)
Dept: CT IMAGING | Facility: HOSPITAL | Age: 51
Discharge: HOME | End: 2021-01-09
Attending: INTERNAL MEDICINE
Payer: COMMERCIAL

## 2021-01-09 DIAGNOSIS — K63.5 POLYP OF COLON, UNSPECIFIED PART OF COLON, UNSPECIFIED TYPE: ICD-10-CM

## 2021-01-09 PROCEDURE — 82565 ASSAY OF CREATININE: CPT

## 2021-01-09 PROCEDURE — 74177 CT ABD & PELVIS W/CONTRAST: CPT | Performed by: INTERNAL MEDICINE

## 2021-01-09 PROCEDURE — 71260 CT THORAX DX C+: CPT | Performed by: INTERNAL MEDICINE

## 2021-01-14 ENCOUNTER — TELEPHONE (OUTPATIENT)
Dept: FAMILY MEDICINE CLINIC | Facility: CLINIC | Age: 51
End: 2021-01-14

## 2021-01-14 DIAGNOSIS — Z01.818 PRE-OP TESTING: Primary | ICD-10-CM

## 2021-01-14 NOTE — TELEPHONE ENCOUNTER
Received request for a BMP and CBC no diff and an EKG from [facility] pre-admission testing. Orders pended to provider. I spoke with Dr Heather Gregory office, Becky Oro, who stated that no H&P has been requested just the testing. Please see pended orders.

## 2021-01-17 ENCOUNTER — EKG ENCOUNTER (OUTPATIENT)
Dept: LAB | Facility: HOSPITAL | Age: 51
End: 2021-01-17
Attending: SURGERY
Payer: COMMERCIAL

## 2021-01-17 ENCOUNTER — HOSPITAL ENCOUNTER (EMERGENCY)
Facility: HOSPITAL | Age: 51
Discharge: HOME | End: 2021-01-17
Attending: EMERGENCY MEDICINE
Payer: COMMERCIAL

## 2021-01-17 ENCOUNTER — LAB ENCOUNTER (OUTPATIENT)
Dept: LAB | Facility: HOSPITAL | Age: 51
End: 2021-01-17
Attending: SURGERY
Payer: COMMERCIAL

## 2021-01-17 VITALS
BODY MASS INDEX: 38.32 KG/M2 | SYSTOLIC BLOOD PRESSURE: 115 MMHG | DIASTOLIC BLOOD PRESSURE: 82 MMHG | WEIGHT: 230 LBS | HEART RATE: 98 BPM | OXYGEN SATURATION: 95 % | RESPIRATION RATE: 16 BRPM | HEIGHT: 65 IN

## 2021-01-17 DIAGNOSIS — Z01.818 PRE-OP TESTING: ICD-10-CM

## 2021-01-17 DIAGNOSIS — I47.1 SVT (SUPRAVENTRICULAR TACHYCARDIA) (HCC): Primary | ICD-10-CM

## 2021-01-17 DIAGNOSIS — E11.69 DIABETES MELLITUS TYPE 2 IN OBESE (HCC): ICD-10-CM

## 2021-01-17 DIAGNOSIS — E66.9 DIABETES MELLITUS TYPE 2 IN OBESE (HCC): ICD-10-CM

## 2021-01-17 DIAGNOSIS — K63.89 COLONIC MASS: ICD-10-CM

## 2021-01-17 LAB
ALBUMIN SERPL-MCNC: 3.7 G/DL (ref 3.4–5)
ALBUMIN/GLOB SERPL: 0.8 {RATIO} (ref 1–2)
ALP LIVER SERPL-CCNC: 101 U/L
ALT SERPL-CCNC: 18 U/L
ANION GAP SERPL CALC-SCNC: 5 MMOL/L (ref 0–18)
ANION GAP SERPL CALC-SCNC: 8 MMOL/L (ref 0–18)
AST SERPL-CCNC: 13 U/L (ref 15–37)
BASOPHILS # BLD AUTO: 0.05 X10(3) UL (ref 0–0.2)
BASOPHILS NFR BLD AUTO: 0.5 %
BILIRUB SERPL-MCNC: 0.4 MG/DL (ref 0.1–2)
BUN BLD-MCNC: 7 MG/DL (ref 7–18)
BUN BLD-MCNC: 7 MG/DL (ref 7–18)
BUN/CREAT SERPL: 7.6 (ref 10–20)
BUN/CREAT SERPL: 9 (ref 10–20)
CALCIUM BLD-MCNC: 9.1 MG/DL (ref 8.5–10.1)
CALCIUM BLD-MCNC: 9.3 MG/DL (ref 8.5–10.1)
CHLORIDE SERPL-SCNC: 104 MMOL/L (ref 98–112)
CHLORIDE SERPL-SCNC: 106 MMOL/L (ref 98–112)
CHOLEST SMN-MCNC: 186 MG/DL (ref ?–200)
CO2 SERPL-SCNC: 22 MMOL/L (ref 21–32)
CO2 SERPL-SCNC: 28 MMOL/L (ref 21–32)
CREAT BLD-MCNC: 0.78 MG/DL
CREAT BLD-MCNC: 0.92 MG/DL
CREAT UR-SCNC: 49 MG/DL
DEPRECATED RDW RBC AUTO: 42.3 FL (ref 35.1–46.3)
DEPRECATED RDW RBC AUTO: 42.5 FL (ref 35.1–46.3)
EOSINOPHIL # BLD AUTO: 0.24 X10(3) UL (ref 0–0.7)
EOSINOPHIL NFR BLD AUTO: 2.2 %
ERYTHROCYTE [DISTWIDTH] IN BLOOD BY AUTOMATED COUNT: 14.6 % (ref 11–15)
ERYTHROCYTE [DISTWIDTH] IN BLOOD BY AUTOMATED COUNT: 14.6 % (ref 11–15)
EST. AVERAGE GLUCOSE BLD GHB EST-MCNC: 197 MG/DL (ref 68–126)
GLOBULIN PLAS-MCNC: 4.8 G/DL (ref 2.8–4.4)
GLUCOSE BLD-MCNC: 147 MG/DL (ref 70–99)
GLUCOSE BLD-MCNC: 167 MG/DL (ref 70–99)
HBA1C MFR BLD HPLC: 8.5 % (ref ?–5.7)
HCT VFR BLD AUTO: 44.9 %
HCT VFR BLD AUTO: 45.5 %
HDLC SERPL-MCNC: 42 MG/DL (ref 40–59)
HGB BLD-MCNC: 14.4 G/DL
HGB BLD-MCNC: 14.8 G/DL
IMM GRANULOCYTES # BLD AUTO: 0.03 X10(3) UL (ref 0–1)
IMM GRANULOCYTES NFR BLD: 0.3 %
LDLC SERPL CALC-MCNC: 114 MG/DL (ref ?–100)
LYMPHOCYTES # BLD AUTO: 2.54 X10(3) UL (ref 1–4)
LYMPHOCYTES NFR BLD AUTO: 23.4 %
M PROTEIN MFR SERPL ELPH: 8.5 G/DL (ref 6.4–8.2)
MCH RBC QN AUTO: 26.1 PG (ref 26–34)
MCH RBC QN AUTO: 26.2 PG (ref 26–34)
MCHC RBC AUTO-ENTMCNC: 32.1 G/DL (ref 31–37)
MCHC RBC AUTO-ENTMCNC: 32.5 G/DL (ref 31–37)
MCV RBC AUTO: 80.5 FL
MCV RBC AUTO: 81.5 FL
MICROALBUMIN UR-MCNC: 13.2 MG/DL
MICROALBUMIN/CREAT 24H UR-RTO: 269.4 UG/MG (ref ?–30)
MONOCYTES # BLD AUTO: 1.12 X10(3) UL (ref 0.1–1)
MONOCYTES NFR BLD AUTO: 10.3 %
NEUTROPHILS # BLD AUTO: 6.87 X10 (3) UL (ref 1.5–7.7)
NEUTROPHILS # BLD AUTO: 6.87 X10(3) UL (ref 1.5–7.7)
NEUTROPHILS NFR BLD AUTO: 63.3 %
NONHDLC SERPL-MCNC: 144 MG/DL (ref ?–130)
OSMOLALITY SERPL CALC.SUM OF ELEC: 284 MOSM/KG (ref 275–295)
OSMOLALITY SERPL CALC.SUM OF ELEC: 285 MOSM/KG (ref 275–295)
PATIENT FASTING Y/N/NP: YES
PATIENT FASTING Y/N/NP: YES
PLATELET # BLD AUTO: 328 10(3)UL (ref 150–450)
PLATELET # BLD AUTO: 340 10(3)UL (ref 150–450)
POTASSIUM SERPL-SCNC: 3.8 MMOL/L (ref 3.5–5.1)
POTASSIUM SERPL-SCNC: 3.9 MMOL/L (ref 3.5–5.1)
RBC # BLD AUTO: 5.51 X10(6)UL
RBC # BLD AUTO: 5.65 X10(6)UL
SODIUM SERPL-SCNC: 136 MMOL/L (ref 136–145)
SODIUM SERPL-SCNC: 137 MMOL/L (ref 136–145)
TRIGL SERPL-MCNC: 149 MG/DL (ref 30–149)
VLDLC SERPL CALC-MCNC: 30 MG/DL (ref 0–30)
WBC # BLD AUTO: 10 X10(3) UL (ref 4–11)
WBC # BLD AUTO: 10.9 X10(3) UL (ref 4–11)

## 2021-01-17 PROCEDURE — 96374 THER/PROPH/DIAG INJ IV PUSH: CPT

## 2021-01-17 PROCEDURE — 80061 LIPID PANEL: CPT

## 2021-01-17 PROCEDURE — 83036 HEMOGLOBIN GLYCOSYLATED A1C: CPT

## 2021-01-17 PROCEDURE — 99291 CRITICAL CARE FIRST HOUR: CPT

## 2021-01-17 PROCEDURE — 96376 TX/PRO/DX INJ SAME DRUG ADON: CPT

## 2021-01-17 PROCEDURE — 82570 ASSAY OF URINE CREATININE: CPT

## 2021-01-17 PROCEDURE — 36415 COLL VENOUS BLD VENIPUNCTURE: CPT

## 2021-01-17 PROCEDURE — 93005 ELECTROCARDIOGRAM TRACING: CPT

## 2021-01-17 PROCEDURE — 85027 COMPLETE CBC AUTOMATED: CPT

## 2021-01-17 PROCEDURE — 96361 HYDRATE IV INFUSION ADD-ON: CPT

## 2021-01-17 PROCEDURE — 80053 COMPREHEN METABOLIC PANEL: CPT

## 2021-01-17 PROCEDURE — 93010 ELECTROCARDIOGRAM REPORT: CPT

## 2021-01-17 PROCEDURE — 82043 UR ALBUMIN QUANTITATIVE: CPT

## 2021-01-17 PROCEDURE — 85025 COMPLETE CBC W/AUTO DIFF WBC: CPT | Performed by: EMERGENCY MEDICINE

## 2021-01-17 PROCEDURE — 93010 ELECTROCARDIOGRAM REPORT: CPT | Performed by: INTERNAL MEDICINE

## 2021-01-17 RX ORDER — SODIUM CHLORIDE 9 MG/ML
INJECTION, SOLUTION INTRAVENOUS CONTINUOUS
Status: DISCONTINUED | OUTPATIENT
Start: 2021-01-17 | End: 2021-01-17

## 2021-01-17 RX ORDER — METOPROLOL SUCCINATE 50 MG/1
50 TABLET, EXTENDED RELEASE ORAL ONCE
Status: COMPLETED | OUTPATIENT
Start: 2021-01-17 | End: 2021-01-17

## 2021-01-17 RX ORDER — ADENOSINE 3 MG/ML
12 INJECTION, SOLUTION INTRAVENOUS ONCE
Status: COMPLETED | OUTPATIENT
Start: 2021-01-17 | End: 2021-01-17

## 2021-01-17 RX ORDER — ADENOSINE 3 MG/ML
INJECTION, SOLUTION INTRAVENOUS
Status: DISCONTINUED
Start: 2021-01-17 | End: 2021-01-17

## 2021-01-17 RX ORDER — ADENOSINE 3 MG/ML
6 INJECTION, SOLUTION INTRAVENOUS ONCE
Status: COMPLETED | OUTPATIENT
Start: 2021-01-17 | End: 2021-01-17

## 2021-01-17 NOTE — ED PROVIDER NOTES
Patient Seen in: [facility] Emergency Department      History   Patient presents with:  Arrythmia/Palpitations    Stated Complaint: Palpitations- SVT hx    HPI/Subjective:   HPI    Patient has a history of SVT and arrives complaining of the same.   Irasema Stallings 6/4/2019    Performed by Batool Covarrubias MD at [facility] MAIN OR   • OTHER SURGICAL HISTORY      Pituitary gland tumors   • TRANS SPENOIDAL  TRANSNASAL HYPOPHYSECTOMY N/A 6/4/2019    Performed by Maribeth Matute MD at [facility] MAIN OR                Social History components:       Result Value    Glucose 167 (*)     BUN/CREA Ratio 7.6 (*)     All other components within normal limits   CBC W/ DIFFERENTIAL - Abnormal; Notable for the following components:    RBC 5.65 (*)     Monocyte Absolute 1.12 (*)     All other Close follow-up with Dr. Bradley Emerson. Return for any new or worsening symptoms. A total of 35 minutes of critical care time (exclusive of billable procedures) was administered to manage the patient's cardiovascular instability due to her SVT.

## 2021-01-18 ENCOUNTER — ANESTHESIA EVENT (OUTPATIENT)
Dept: SURGERY | Facility: HOSPITAL | Age: 51
DRG: 330 | End: 2021-01-18
Payer: COMMERCIAL

## 2021-01-18 LAB
ATRIAL RATE: 105 BPM
ATRIAL RATE: 197 BPM
ATRIAL RATE: 200 BPM
P AXIS: 56 DEGREES
P-R INTERVAL: 148 MS
Q-T INTERVAL: 230 MS
Q-T INTERVAL: 234 MS
Q-T INTERVAL: 326 MS
QRS DURATION: 72 MS
QRS DURATION: 84 MS
QRS DURATION: 94 MS
QTC CALCULATION (BEZET): 419 MS
QTC CALCULATION (BEZET): 422 MS
QTC CALCULATION (BEZET): 430 MS
R AXIS: 75 DEGREES
R AXIS: 75 DEGREES
R AXIS: 83 DEGREES
SARS-COV-2 RNA RESP QL NAA+PROBE: NOT DETECTED
T AXIS: -74 DEGREES
T AXIS: -86 DEGREES
T AXIS: 40 DEGREES
VENTRICULAR RATE: 105 BPM
VENTRICULAR RATE: 196 BPM
VENTRICULAR RATE: 200 BPM

## 2021-01-18 NOTE — PROGRESS NOTES
[address] [place] [id number]    Dear Ms Jonathan Honeycutt    Thankfully nothing concerning was found on your recent CT scan. You do have a couple of stable breast nodules for which you should follow up with your primary care doctor.   Good

## 2021-01-20 ENCOUNTER — ANESTHESIA (OUTPATIENT)
Dept: SURGERY | Facility: HOSPITAL | Age: 51
DRG: 330 | End: 2021-01-20
Payer: COMMERCIAL

## 2021-01-20 ENCOUNTER — APPOINTMENT (OUTPATIENT)
Dept: GENERAL RADIOLOGY | Facility: HOSPITAL | Age: 51
DRG: 330 | End: 2021-01-20
Attending: INTERNAL MEDICINE
Payer: COMMERCIAL

## 2021-01-20 ENCOUNTER — HOSPITAL ENCOUNTER (INPATIENT)
Facility: HOSPITAL | Age: 51
LOS: 2 days | Discharge: HOME | DRG: 330 | End: 2021-01-22
Attending: SURGERY | Admitting: SURGERY
Payer: COMMERCIAL

## 2021-01-20 DIAGNOSIS — K63.89 COLONIC MASS: Primary | ICD-10-CM

## 2021-01-20 PROBLEM — D35.2 PITUITARY ADENOMA (HCC): Status: RESOLVED | Noted: 2019-04-01 | Resolved: 2021-01-20

## 2021-01-20 LAB
BILIRUB UR QL STRIP.AUTO: NEGATIVE
GLUCOSE BLD-MCNC: 173 MG/DL (ref 70–99)
GLUCOSE BLD-MCNC: 176 MG/DL (ref 70–99)
GLUCOSE BLD-MCNC: 198 MG/DL (ref 70–99)
GLUCOSE BLD-MCNC: 203 MG/DL (ref 70–99)
GLUCOSE BLD-MCNC: 227 MG/DL (ref 70–99)
GLUCOSE UR STRIP.AUTO-MCNC: NEGATIVE MG/DL
LEUKOCYTE ESTERASE UR QL STRIP.AUTO: NEGATIVE
NITRITE UR QL STRIP.AUTO: NEGATIVE
PH UR STRIP.AUTO: 5 [PH] (ref 4.5–8)
POCT LOT NUMBER: NORMAL
POCT URINE PREGNANCY: NEGATIVE
PROT UR STRIP.AUTO-MCNC: 30 MG/DL
RBC UR QL AUTO: NEGATIVE
SP GR UR STRIP.AUTO: 1.03 (ref 1–1.03)
UROBILINOGEN UR STRIP.AUTO-MCNC: <2 MG/DL

## 2021-01-20 PROCEDURE — 87086 URINE CULTURE/COLONY COUNT: CPT | Performed by: INTERNAL MEDICINE

## 2021-01-20 PROCEDURE — S0030 INJECTION, METRONIDAZOLE: HCPCS | Performed by: ANESTHESIOLOGY

## 2021-01-20 PROCEDURE — 88331 PATH CONSLTJ SURG 1 BLK 1SPC: CPT | Performed by: SURGERY

## 2021-01-20 PROCEDURE — 8E0W4CZ ROBOTIC ASSISTED PROCEDURE OF TRUNK REGION, PERCUTANEOUS ENDOSCOPIC APPROACH: ICD-10-PCS | Performed by: SURGERY

## 2021-01-20 PROCEDURE — 81001 URINALYSIS AUTO W/SCOPE: CPT | Performed by: INTERNAL MEDICINE

## 2021-01-20 PROCEDURE — 71045 X-RAY EXAM CHEST 1 VIEW: CPT | Performed by: INTERNAL MEDICINE

## 2021-01-20 PROCEDURE — 82962 GLUCOSE BLOOD TEST: CPT

## 2021-01-20 PROCEDURE — 81025 URINE PREGNANCY TEST: CPT | Performed by: SURGERY

## 2021-01-20 PROCEDURE — S0030 INJECTION, METRONIDAZOLE: HCPCS | Performed by: SURGERY

## 2021-01-20 PROCEDURE — 0DBH4ZZ EXCISION OF CECUM, PERCUTANEOUS ENDOSCOPIC APPROACH: ICD-10-PCS | Performed by: SURGERY

## 2021-01-20 PROCEDURE — 87040 BLOOD CULTURE FOR BACTERIA: CPT | Performed by: INTERNAL MEDICINE

## 2021-01-20 PROCEDURE — 88307 TISSUE EXAM BY PATHOLOGIST: CPT | Performed by: SURGERY

## 2021-01-20 RX ORDER — DOCUSATE SODIUM 100 MG/1
100 CAPSULE, LIQUID FILLED ORAL 2 TIMES DAILY
Status: DISCONTINUED | OUTPATIENT
Start: 2021-01-20 | End: 2021-01-22

## 2021-01-20 RX ORDER — DIPHENHYDRAMINE HYDROCHLORIDE 50 MG/ML
12.5 INJECTION INTRAMUSCULAR; INTRAVENOUS AS NEEDED
Status: DISCONTINUED | OUTPATIENT
Start: 2021-01-20 | End: 2021-01-20 | Stop reason: HOSPADM

## 2021-01-20 RX ORDER — METRONIDAZOLE 500 MG/100ML
INJECTION, SOLUTION INTRAVENOUS AS NEEDED
Status: DISCONTINUED | OUTPATIENT
Start: 2021-01-20 | End: 2021-01-20 | Stop reason: SURG

## 2021-01-20 RX ORDER — HYDROCODONE BITARTRATE AND ACETAMINOPHEN 5; 325 MG/1; MG/1
2 TABLET ORAL AS NEEDED
Status: DISCONTINUED | OUTPATIENT
Start: 2021-01-20 | End: 2021-01-20 | Stop reason: HOSPADM

## 2021-01-20 RX ORDER — HYDROMORPHONE HYDROCHLORIDE 1 MG/ML
0.8 INJECTION, SOLUTION INTRAMUSCULAR; INTRAVENOUS; SUBCUTANEOUS EVERY 2 HOUR PRN
Status: DISCONTINUED | OUTPATIENT
Start: 2021-01-20 | End: 2021-01-22

## 2021-01-20 RX ORDER — OXYCODONE HYDROCHLORIDE 15 MG/1
15 TABLET ORAL EVERY 4 HOURS PRN
Status: DISCONTINUED | OUTPATIENT
Start: 2021-01-20 | End: 2021-01-22

## 2021-01-20 RX ORDER — DEXTROSE MONOHYDRATE 25 G/50ML
50 INJECTION, SOLUTION INTRAVENOUS
Status: DISCONTINUED | OUTPATIENT
Start: 2021-01-20 | End: 2021-01-22

## 2021-01-20 RX ORDER — OXYCODONE HYDROCHLORIDE 5 MG/1
5 TABLET ORAL EVERY 4 HOURS PRN
Status: DISCONTINUED | OUTPATIENT
Start: 2021-01-20 | End: 2021-01-22

## 2021-01-20 RX ORDER — KETOROLAC TROMETHAMINE 30 MG/ML
30 INJECTION, SOLUTION INTRAMUSCULAR; INTRAVENOUS EVERY 6 HOURS PRN
Status: DISCONTINUED | OUTPATIENT
Start: 2021-01-20 | End: 2021-01-22

## 2021-01-20 RX ORDER — ACETAMINOPHEN 500 MG
1000 TABLET ORAL ONCE AS NEEDED
Status: DISCONTINUED | OUTPATIENT
Start: 2021-01-20 | End: 2021-01-20 | Stop reason: HOSPADM

## 2021-01-20 RX ORDER — LIDOCAINE HYDROCHLORIDE 40 MG/ML
INJECTION, SOLUTION RETROBULBAR; TOPICAL AS NEEDED
Status: DISCONTINUED | OUTPATIENT
Start: 2021-01-20 | End: 2021-01-20 | Stop reason: SURG

## 2021-01-20 RX ORDER — MEPERIDINE HYDROCHLORIDE 25 MG/ML
12.5 INJECTION INTRAMUSCULAR; INTRAVENOUS; SUBCUTANEOUS AS NEEDED
Status: DISCONTINUED | OUTPATIENT
Start: 2021-01-20 | End: 2021-01-20 | Stop reason: HOSPADM

## 2021-01-20 RX ORDER — NALOXONE HYDROCHLORIDE 0.4 MG/ML
80 INJECTION, SOLUTION INTRAMUSCULAR; INTRAVENOUS; SUBCUTANEOUS AS NEEDED
Status: DISCONTINUED | OUTPATIENT
Start: 2021-01-20 | End: 2021-01-20 | Stop reason: HOSPADM

## 2021-01-20 RX ORDER — GABAPENTIN 300 MG/1
300 CAPSULE ORAL ONCE
Status: COMPLETED | OUTPATIENT
Start: 2021-01-20 | End: 2021-01-20

## 2021-01-20 RX ORDER — HYDROMORPHONE HYDROCHLORIDE 1 MG/ML
0.2 INJECTION, SOLUTION INTRAMUSCULAR; INTRAVENOUS; SUBCUTANEOUS EVERY 2 HOUR PRN
Status: DISCONTINUED | OUTPATIENT
Start: 2021-01-20 | End: 2021-01-22

## 2021-01-20 RX ORDER — MAGNESIUM OXIDE 400 MG (241.3 MG MAGNESIUM) TABLET
400 TABLET DAILY
Status: DISCONTINUED | OUTPATIENT
Start: 2021-01-20 | End: 2021-01-22

## 2021-01-20 RX ORDER — DEXTROSE MONOHYDRATE 25 G/50ML
50 INJECTION, SOLUTION INTRAVENOUS
Status: DISCONTINUED | OUTPATIENT
Start: 2021-01-20 | End: 2021-01-20 | Stop reason: HOSPADM

## 2021-01-20 RX ORDER — KETOROLAC TROMETHAMINE 30 MG/ML
INJECTION, SOLUTION INTRAMUSCULAR; INTRAVENOUS AS NEEDED
Status: DISCONTINUED | OUTPATIENT
Start: 2021-01-20 | End: 2021-01-20 | Stop reason: SURG

## 2021-01-20 RX ORDER — POLYETHYLENE GLYCOL 3350, SODIUM SULFATE ANHYDROUS, SODIUM BICARBONATE, SODIUM CHLORIDE, POTASSIUM CHLORIDE 236; 22.74; 6.74; 5.86; 2.97 G/4L; G/4L; G/4L; G/4L; G/4L
4000 POWDER, FOR SOLUTION ORAL ONCE
Status: ON HOLD | COMMUNITY
Start: 2021-01-08 | End: 2021-01-21

## 2021-01-20 RX ORDER — ACETAMINOPHEN 10 MG/ML
INJECTION, SOLUTION INTRAVENOUS AS NEEDED
Status: DISCONTINUED | OUTPATIENT
Start: 2021-01-20 | End: 2021-01-20 | Stop reason: SURG

## 2021-01-20 RX ORDER — SODIUM CHLORIDE 9 MG/ML
INJECTION, SOLUTION INTRAVENOUS CONTINUOUS
Status: DISCONTINUED | OUTPATIENT
Start: 2021-01-20 | End: 2021-01-22

## 2021-01-20 RX ORDER — HEPARIN SODIUM 5000 [USP'U]/ML
5000 INJECTION, SOLUTION INTRAVENOUS; SUBCUTANEOUS ONCE
Status: COMPLETED | OUTPATIENT
Start: 2021-01-20 | End: 2021-01-20

## 2021-01-20 RX ORDER — ACETAMINOPHEN 500 MG
1000 TABLET ORAL ONCE
Status: DISCONTINUED | OUTPATIENT
Start: 2021-01-20 | End: 2021-01-20

## 2021-01-20 RX ORDER — ACETAMINOPHEN 325 MG/1
650 TABLET ORAL EVERY 6 HOURS PRN
Status: DISCONTINUED | OUTPATIENT
Start: 2021-01-20 | End: 2021-01-22

## 2021-01-20 RX ORDER — MIDAZOLAM HYDROCHLORIDE 1 MG/ML
INJECTION INTRAMUSCULAR; INTRAVENOUS AS NEEDED
Status: DISCONTINUED | OUTPATIENT
Start: 2021-01-20 | End: 2021-01-20 | Stop reason: SURG

## 2021-01-20 RX ORDER — ACETAMINOPHEN 500 MG
1000 TABLET ORAL ONCE
Status: ON HOLD | COMMUNITY
End: 2021-01-21

## 2021-01-20 RX ORDER — SODIUM CHLORIDE 9 MG/ML
INJECTION, SOLUTION INTRAVENOUS CONTINUOUS
Status: DISCONTINUED | OUTPATIENT
Start: 2021-01-20 | End: 2021-01-20 | Stop reason: HOSPADM

## 2021-01-20 RX ORDER — OXYCODONE HYDROCHLORIDE 10 MG/1
10 TABLET ORAL EVERY 4 HOURS PRN
Status: DISCONTINUED | OUTPATIENT
Start: 2021-01-20 | End: 2021-01-22

## 2021-01-20 RX ORDER — ONDANSETRON 2 MG/ML
4 INJECTION INTRAMUSCULAR; INTRAVENOUS EVERY 4 HOURS PRN
Status: DISCONTINUED | OUTPATIENT
Start: 2021-01-20 | End: 2021-01-22

## 2021-01-20 RX ORDER — ACETAMINOPHEN 500 MG
500 TABLET ORAL EVERY 6 HOURS PRN
Status: ON HOLD | COMMUNITY
End: 2021-01-20

## 2021-01-20 RX ORDER — HYDROMORPHONE HYDROCHLORIDE 1 MG/ML
0.4 INJECTION, SOLUTION INTRAMUSCULAR; INTRAVENOUS; SUBCUTANEOUS EVERY 5 MIN PRN
Status: DISCONTINUED | OUTPATIENT
Start: 2021-01-20 | End: 2021-01-20 | Stop reason: HOSPADM

## 2021-01-20 RX ORDER — FAMOTIDINE 20 MG/1
20 TABLET ORAL 2 TIMES DAILY
Status: DISCONTINUED | OUTPATIENT
Start: 2021-01-20 | End: 2021-01-22

## 2021-01-20 RX ORDER — LIDOCAINE HYDROCHLORIDE ANHYDROUS AND DEXTROSE MONOHYDRATE .8; 5 G/100ML; G/100ML
INJECTION, SOLUTION INTRAVENOUS CONTINUOUS PRN
Status: DISCONTINUED | OUTPATIENT
Start: 2021-01-20 | End: 2021-01-20 | Stop reason: SURG

## 2021-01-20 RX ORDER — METRONIDAZOLE 500 MG/100ML
500 INJECTION, SOLUTION INTRAVENOUS ONCE
Status: DISCONTINUED | OUTPATIENT
Start: 2021-01-20 | End: 2021-01-20 | Stop reason: HOSPADM

## 2021-01-20 RX ORDER — METOPROLOL SUCCINATE 50 MG/1
50 TABLET, EXTENDED RELEASE ORAL DAILY
Status: DISCONTINUED | OUTPATIENT
Start: 2021-01-20 | End: 2021-01-22

## 2021-01-20 RX ORDER — FAMOTIDINE 10 MG/ML
20 INJECTION, SOLUTION INTRAVENOUS 2 TIMES DAILY
Status: DISCONTINUED | OUTPATIENT
Start: 2021-01-20 | End: 2021-01-22

## 2021-01-20 RX ORDER — METOCLOPRAMIDE HYDROCHLORIDE 5 MG/ML
10 INJECTION INTRAMUSCULAR; INTRAVENOUS AS NEEDED
Status: DISCONTINUED | OUTPATIENT
Start: 2021-01-20 | End: 2021-01-20 | Stop reason: HOSPADM

## 2021-01-20 RX ORDER — SODIUM CHLORIDE, SODIUM LACTATE, POTASSIUM CHLORIDE, CALCIUM CHLORIDE 600; 310; 30; 20 MG/100ML; MG/100ML; MG/100ML; MG/100ML
1 INJECTION, SOLUTION INTRAVENOUS CONTINUOUS
Status: DISCONTINUED | OUTPATIENT
Start: 2021-01-20 | End: 2021-01-22

## 2021-01-20 RX ORDER — MAGNESIUM OXIDE 400 MG (241.3 MG MAGNESIUM) TABLET
1 TABLET NIGHTLY
Status: DISCONTINUED | OUTPATIENT
Start: 2021-01-20 | End: 2021-01-22

## 2021-01-20 RX ORDER — LABETALOL HYDROCHLORIDE 5 MG/ML
5 INJECTION, SOLUTION INTRAVENOUS EVERY 5 MIN PRN
Status: DISCONTINUED | OUTPATIENT
Start: 2021-01-20 | End: 2021-01-20 | Stop reason: HOSPADM

## 2021-01-20 RX ORDER — LIDOCAINE HYDROCHLORIDE 10 MG/ML
INJECTION, SOLUTION INFILTRATION; PERINEURAL AS NEEDED
Status: DISCONTINUED | OUTPATIENT
Start: 2021-01-20 | End: 2021-01-20 | Stop reason: HOSPADM

## 2021-01-20 RX ORDER — HYDROCODONE BITARTRATE AND ACETAMINOPHEN 5; 325 MG/1; MG/1
1 TABLET ORAL AS NEEDED
Status: DISCONTINUED | OUTPATIENT
Start: 2021-01-20 | End: 2021-01-20 | Stop reason: HOSPADM

## 2021-01-20 RX ORDER — LEVOTHYROXINE SODIUM 88 UG/1
88 TABLET ORAL
Status: DISCONTINUED | OUTPATIENT
Start: 2021-01-21 | End: 2021-01-22

## 2021-01-20 RX ORDER — HYDROMORPHONE HYDROCHLORIDE 1 MG/ML
0.4 INJECTION, SOLUTION INTRAMUSCULAR; INTRAVENOUS; SUBCUTANEOUS EVERY 2 HOUR PRN
Status: DISCONTINUED | OUTPATIENT
Start: 2021-01-20 | End: 2021-01-22

## 2021-01-20 RX ORDER — ROCURONIUM BROMIDE 10 MG/ML
INJECTION, SOLUTION INTRAVENOUS AS NEEDED
Status: DISCONTINUED | OUTPATIENT
Start: 2021-01-20 | End: 2021-01-20 | Stop reason: SURG

## 2021-01-20 RX ORDER — GABAPENTIN 300 MG/1
300 CAPSULE ORAL NIGHTLY
Status: DISCONTINUED | OUTPATIENT
Start: 2021-01-20 | End: 2021-01-22

## 2021-01-20 RX ORDER — ONDANSETRON 2 MG/ML
INJECTION INTRAMUSCULAR; INTRAVENOUS AS NEEDED
Status: DISCONTINUED | OUTPATIENT
Start: 2021-01-20 | End: 2021-01-20 | Stop reason: SURG

## 2021-01-20 RX ORDER — POLYETHYLENE GLYCOL 3350 17 G/17G
17 POWDER, FOR SOLUTION ORAL DAILY PRN
Status: DISCONTINUED | OUTPATIENT
Start: 2021-01-20 | End: 2021-01-22

## 2021-01-20 RX ORDER — ONDANSETRON 2 MG/ML
4 INJECTION INTRAMUSCULAR; INTRAVENOUS AS NEEDED
Status: DISCONTINUED | OUTPATIENT
Start: 2021-01-20 | End: 2021-01-20 | Stop reason: HOSPADM

## 2021-01-20 RX ORDER — ALPRAZOLAM 0.5 MG/1
0.5 TABLET ORAL 3 TIMES DAILY PRN
Status: DISCONTINUED | OUTPATIENT
Start: 2021-01-20 | End: 2021-01-22

## 2021-01-20 RX ORDER — SCOLOPAMINE TRANSDERMAL SYSTEM 1 MG/1
1 PATCH, EXTENDED RELEASE TRANSDERMAL
Status: COMPLETED | OUTPATIENT
Start: 2021-01-20 | End: 2021-01-20

## 2021-01-20 RX ORDER — BUPIVACAINE HYDROCHLORIDE 5 MG/ML
INJECTION, SOLUTION EPIDURAL; INTRACAUDAL AS NEEDED
Status: DISCONTINUED | OUTPATIENT
Start: 2021-01-20 | End: 2021-01-20 | Stop reason: HOSPADM

## 2021-01-20 RX ORDER — ENOXAPARIN SODIUM 100 MG/ML
40 INJECTION SUBCUTANEOUS DAILY
Status: DISCONTINUED | OUTPATIENT
Start: 2021-01-21 | End: 2021-01-22

## 2021-01-20 RX ADMIN — SODIUM CHLORIDE: 9 INJECTION, SOLUTION INTRAVENOUS at 09:40:00

## 2021-01-20 RX ADMIN — KETOROLAC TROMETHAMINE 30 MG: 30 INJECTION, SOLUTION INTRAMUSCULAR; INTRAVENOUS at 09:30:00

## 2021-01-20 RX ADMIN — ACETAMINOPHEN 1000 MG: 10 INJECTION, SOLUTION INTRAVENOUS at 09:29:00

## 2021-01-20 RX ADMIN — SODIUM CHLORIDE: 9 INJECTION, SOLUTION INTRAVENOUS at 09:41:00

## 2021-01-20 RX ADMIN — SODIUM CHLORIDE: 9 INJECTION, SOLUTION INTRAVENOUS at 07:44:00

## 2021-01-20 RX ADMIN — LIDOCAINE HYDROCHLORIDE 159 MG: 40 INJECTION, SOLUTION RETROBULBAR; TOPICAL at 07:43:00

## 2021-01-20 RX ADMIN — MIDAZOLAM HYDROCHLORIDE 2 MG: 1 INJECTION INTRAMUSCULAR; INTRAVENOUS at 07:37:00

## 2021-01-20 RX ADMIN — ONDANSETRON 4 MG: 2 INJECTION INTRAMUSCULAR; INTRAVENOUS at 09:32:00

## 2021-01-20 RX ADMIN — LIDOCAINE HYDROCHLORIDE ANHYDROUS AND DEXTROSE MONOHYDRATE 2 MG/KG/HR: .8; 5 INJECTION, SOLUTION INTRAVENOUS at 07:43:00

## 2021-01-20 RX ADMIN — METRONIDAZOLE 500 MG: 500 INJECTION, SOLUTION INTRAVENOUS at 07:46:00

## 2021-01-20 RX ADMIN — SODIUM CHLORIDE: 9 INJECTION, SOLUTION INTRAVENOUS at 07:37:00

## 2021-01-20 RX ADMIN — SCOLOPAMINE TRANSDERMAL SYSTEM 1 PATCH: 1 PATCH, EXTENDED RELEASE TRANSDERMAL at 07:44:00

## 2021-01-20 RX ADMIN — ROCURONIUM BROMIDE 100 MG: 10 INJECTION, SOLUTION INTRAVENOUS at 07:43:00

## 2021-01-20 NOTE — ANESTHESIA POSTPROCEDURE EVALUATION
[address] [place] [address] Patient Status:  Inpatient   Age/Gender 46year old female MRN [id number]   Location [facility] Attending Yanira Beltre MD   [facility] Day # 0 PCP LUZ ZAMORA, DO       Anesthesia Post-op N

## 2021-01-20 NOTE — ANESTHESIA PREPROCEDURE EVALUATION
PRE-OP EVALUATION    Patient Name: Milton Ramirez    Pre-op Diagnosis: Colonic mass [K63.89]    Procedure(s):  XI ROBOT ASSISTED PARTIAL CECECTOMY    Surgeon(s) and Role:     Celio Mansfield MD - Primary    Pre-op vitals reviewed.   Temp: 96.7 °F (35.9 °C) MG/ML injection 0.5 mg, 0.5 mg, Intravenous, PRN    •  Labetalol HCl (TRANDATE) injection 5 mg, 5 mg, Intravenous, Q5 Min PRN    •  Naloxone HCl (NARCAN) 0.4 MG/ML injection 80 mcg, 80 mcg, Intravenous, PRN    •  diphenhydrAMINE HCl (BENADRYL) IV PUSH inje Rfl: 1, Past Week at Unknown time    •  Mupirocin Calcium (BACTROBAN NASAL) 2 % Nasal Ointment, Apply small amount to each nare twice daily for 5 days, Disp: 1 Tube, Rfl: 0, 1/6/2021 at Unknown time    •  Clobetasol Propionate 0.05 % External Ointment, Andrew route 2 (two) times daily before meals. , Disp: 100 each, Rfl: 0        Allergies: Eggs Or Egg-Derived Products, Peanuts [Peanut Oil], Pineapple, and Walnuts      Anesthesia Evaluation    Patient summary reviewed.     Anesthetic Complications  (-) history of Cigarettes        Quit date: 2002        Years since quittin.7      Smokeless tobacco: Never Used      Tobacco comment: QUIT ABOUT 15 YEARS AGO PER PT REPORT    Alcohol use: Never      Frequency: Never      Drug use: Unknown     Available pre-op answered and consent was attained.

## 2021-01-20 NOTE — OPERATIVE REPORT
[facility]                                                         Operative Note    [facility] Location: [facility]  Perioperative   CSN [id number] MRN [id number]   Admission Date 1/20/2021 Procedure Date 1/20/2021   Attending Physician Avila Calderon MD P to be removed outside of the boundaries of the entry point of the ileum. Second second specimen was opened and the entire polyp was present within the specimen. Hemostasis achieved. The abdomen was irrigated with saline solution and irrisept.   Patient t

## 2021-01-20 NOTE — ANESTHESIA PROCEDURE NOTES
Airway  Date/Time: 1/20/2021 7:43 AM  Urgency: elective      General Information and Staff    Patient location during procedure: OR  Anesthesiologist: Jorge Richards MD    Indications and Patient Condition  Indications for airway management: anesthesia

## 2021-01-20 NOTE — OR NURSING
Dr. Will Gar made aware patient started golytely at 2100 and stopped at 2300, did not finish 1/4 of the golytely. Denies formed stools, states it is light brown. Last solids yesterday at 1315, then clear liquids. Took antibiotics late yesterday as well.  Ok to

## 2021-01-20 NOTE — CONSULTS
[facility] Patient Status:  Inpatient    1/3/1970 MRN [id number]   [facility] 3NW-A Attending Tiffany Mansfield MD   Hosp Day # 0 [facility]     Chief Complaint: Colonic Mass    Hist Tim Calle MD at [facility] OR   • OTHER SURGICAL HISTORY      Pituitary gland tumors   • TRANS SPENOIDAL  TRANSNASAL HYPOPHYSECTOMY N/A 6/4/2019    Performed by Jose A Rivera MD at [facility] OR       Social History:  reports that she quit smoking abou MG Oral Tab, Take 1 tablet (0.5 mg total) by mouth 3 (three) times daily as needed. , Disp: 60 tablet, Rfl: 1    •  Mupirocin Calcium (BACTROBAN NASAL) 2 % Nasal Ointment, Apply small amount to each nare twice daily for 5 days, Disp: 1 Tube, Rfl: 0    •  Cl A comprehensive 14 point review of systems was completed. Pertinent positives and negatives noted in the HPI.     Physical Exam:    /86 (BP Location: Right arm)   Pulse 69   Temp 97.8 °F (36.6 °C) (Oral)   Resp 18   Wt 235 lb 7.2 oz (106.8 kg) colectomy.   -surgery following  -famotidine iv  -diet per surgery    Post Operative Pain  -hydromorphone iv prn  -toradol iv prn  -oxy ir po prn     Type 2 DM  -insulin sliding scale    HTN  -BP stable  -metoprolol    Hx SVT  -metoprolol    HLD  -resume o

## 2021-01-21 LAB
ANION GAP SERPL CALC-SCNC: 4 MMOL/L (ref 0–18)
BASOPHILS # BLD AUTO: 0.03 X10(3) UL (ref 0–0.2)
BASOPHILS NFR BLD AUTO: 0.2 %
BUN BLD-MCNC: 7 MG/DL (ref 7–18)
BUN/CREAT SERPL: 8.1 (ref 10–20)
CALCIUM BLD-MCNC: 8.6 MG/DL (ref 8.5–10.1)
CHLORIDE SERPL-SCNC: 106 MMOL/L (ref 98–112)
CO2 SERPL-SCNC: 26 MMOL/L (ref 21–32)
CREAT BLD-MCNC: 0.86 MG/DL
DEPRECATED RDW RBC AUTO: 45.1 FL (ref 35.1–46.3)
EOSINOPHIL # BLD AUTO: 0.04 X10(3) UL (ref 0–0.7)
EOSINOPHIL NFR BLD AUTO: 0.3 %
ERYTHROCYTE [DISTWIDTH] IN BLOOD BY AUTOMATED COUNT: 15 % (ref 11–15)
GLUCOSE BLD-MCNC: 142 MG/DL (ref 70–99)
GLUCOSE BLD-MCNC: 150 MG/DL (ref 70–99)
GLUCOSE BLD-MCNC: 154 MG/DL (ref 70–99)
GLUCOSE BLD-MCNC: 173 MG/DL (ref 70–99)
GLUCOSE BLD-MCNC: 204 MG/DL (ref 70–99)
HAV IGM SER QL: 1.8 MG/DL (ref 1.6–2.6)
HCT VFR BLD AUTO: 37.7 %
HGB BLD-MCNC: 12 G/DL
IMM GRANULOCYTES # BLD AUTO: 0.06 X10(3) UL (ref 0–1)
IMM GRANULOCYTES NFR BLD: 0.4 %
LYMPHOCYTES # BLD AUTO: 1.09 X10(3) UL (ref 1–4)
LYMPHOCYTES NFR BLD AUTO: 7.6 %
MCH RBC QN AUTO: 26.5 PG (ref 26–34)
MCHC RBC AUTO-ENTMCNC: 31.8 G/DL (ref 31–37)
MCV RBC AUTO: 83.2 FL
MONOCYTES # BLD AUTO: 1.13 X10(3) UL (ref 0.1–1)
MONOCYTES NFR BLD AUTO: 7.9 %
NEUTROPHILS # BLD AUTO: 11.96 X10 (3) UL (ref 1.5–7.7)
NEUTROPHILS # BLD AUTO: 11.96 X10(3) UL (ref 1.5–7.7)
NEUTROPHILS NFR BLD AUTO: 83.6 %
OSMOLALITY SERPL CALC.SUM OF ELEC: 283 MOSM/KG (ref 275–295)
PHOSPHATE SERPL-MCNC: 2.8 MG/DL (ref 2.5–4.9)
PLATELET # BLD AUTO: 195 10(3)UL (ref 150–450)
POTASSIUM SERPL-SCNC: 3.8 MMOL/L (ref 3.5–5.1)
RBC # BLD AUTO: 4.53 X10(6)UL
SODIUM SERPL-SCNC: 136 MMOL/L (ref 136–145)
WBC # BLD AUTO: 14.3 X10(3) UL (ref 4–11)

## 2021-01-21 PROCEDURE — 97535 SELF CARE MNGMENT TRAINING: CPT

## 2021-01-21 PROCEDURE — 85025 COMPLETE CBC W/AUTO DIFF WBC: CPT | Performed by: SURGERY

## 2021-01-21 PROCEDURE — 82962 GLUCOSE BLOOD TEST: CPT

## 2021-01-21 PROCEDURE — 84100 ASSAY OF PHOSPHORUS: CPT | Performed by: SURGERY

## 2021-01-21 PROCEDURE — 83735 ASSAY OF MAGNESIUM: CPT | Performed by: SURGERY

## 2021-01-21 PROCEDURE — 97116 GAIT TRAINING THERAPY: CPT

## 2021-01-21 PROCEDURE — 97165 OT EVAL LOW COMPLEX 30 MIN: CPT

## 2021-01-21 PROCEDURE — 80048 BASIC METABOLIC PNL TOTAL CA: CPT | Performed by: SURGERY

## 2021-01-21 PROCEDURE — 97161 PT EVAL LOW COMPLEX 20 MIN: CPT

## 2021-01-21 RX ORDER — POTASSIUM CHLORIDE 20 MEQ/1
40 TABLET, EXTENDED RELEASE ORAL ONCE
Status: COMPLETED | OUTPATIENT
Start: 2021-01-21 | End: 2021-01-21

## 2021-01-21 RX ORDER — HYDROCODONE BITARTRATE AND ACETAMINOPHEN 5; 325 MG/1; MG/1
1 TABLET ORAL EVERY 4 HOURS PRN
Qty: 20 TABLET | Refills: 0 | Status: SHIPPED | OUTPATIENT
Start: 2021-01-21 | End: 2021-01-31

## 2021-01-21 NOTE — PLAN OF CARE
Received pt from [address] [place] [address]. fEELING GOOD after surgery. Pt then ate clear liquid diet & vomited med/lg amt of fluid. Zofran given, pt then C/O pain. Oxy was given first but pt vomited it up. Dilaudid given for pain. Pt sleepy. Used bed pan, small amt of stool .

## 2021-01-21 NOTE — CM/SW NOTE
Call from [facility]  with [employer]. She is available to assist with discharge planning needs. Her contact is [phone].     Elvia Escobar RN, [address] [place] [address]  Extension [phone]

## 2021-01-21 NOTE — PROGRESS NOTES
[facility] Hospitalist Progress Note                                                                   [address]sec[address]  1/3/1970    CC: FU post op    Interval History:  - Doing OK reports moderate pa incisions cdi  Ext: No cyanosis, clubbing, or edema  Skin: Skin warm and dry. No rashes or lesions.   Psych: AAO x 3, with appropriate affect  Neuro: no new focal neuro deficits noted on my exam    Pertinent Labs:   Recent Labs   Lab 01/17/21  1024 01/17/21 Chart       Possible DC later today pending clinical course.     Marvin Luque MD  [facility] Hospitalist  Pager: [phone]

## 2021-01-21 NOTE — PLAN OF CARE
Problem: Diabetes/Glucose Control  Goal: Glucose maintained within prescribed range  Description: INTERVENTIONS:  - Monitor Blood Glucose as ordered  - Assess for signs and symptoms of hyperglycemia and hypoglycemia  - Administer ordered medications to m medications  - Encourage mobilization and activity  - Obtain nutritional consult as needed  - Establish a toileting routine/schedule  - Consider collaborating with pharmacy to review patient's medication profile  Outcome: Progressing  Goal: Maintains adequ appropriate  - Consider OT/PT consult to assist with strengthening/mobility  - Encourage toileting schedule  Outcome: Progressing   ALERT & ORIENTED X4. WITH TOLERABLE PAIN. NO FLATUS YET BUT BURPING. AMBULATED IN HALLWAY WITH WALKER EARLIER. USE OF I.S. Soha Waters

## 2021-01-21 NOTE — PROGRESS NOTES
[facility]  Progress Note    Nigel Salcedo Patient Status:  Inpatient    1/3/1970 MRN [id number]   [facility] 3NW-A Attending Avila Calderon MD   Hosp Day # 1 PCP LUZ ZAMORA, DO     Subjective:    Patient reports pain controll controlled, tolerating diet  All questions answered  DW Janice Reid [id number] Surgery  1/21/2021

## 2021-01-21 NOTE — PHYSICAL THERAPY NOTE
PHYSICAL THERAPY QUICK EVALUATION - INPATIENT    Room Number: 313/313-A  Evaluation Date: 1/21/2021  Presenting Problem: s/p robot assisted partial colectomy  Physician Order: PT Eval and Treat    Problem List  Active Problems:    * No active hospital pr Equipment: None       Prior Level of [place]: Pt independent prior to admission, she is a CRT PCT at [address]  Pt eager to participate    OBJECTIVE  Precautions:  Other (Comment)(surgical)  Fall Risk: Standard fall risk    WEIGHT BEARING RE pt on role of PT, POC, DC planning/recs, positioning.,activity. Patient End of Session: Up in chair;Needs met;Call light within reach;RN aware of session/findings; All patient questions and concerns addressed    ASSESSMENT   Patient is a 46year old f

## 2021-01-21 NOTE — OCCUPATIONAL THERAPY NOTE
OCCUPATIONAL THERAPY QUICK EVALUATION - INPATIENT    Room Number: 313/313-A  Evaluation Date: 1/21/2021     Type of Evaluation: Quick Eval  Presenting Problem: Robot assisted partial cecectomy    Physician Order: IP Consult to Occupational Therapy  Reason • TRANS SPENOIDAL  TRANSNASAL HYPOPHYSECTOMY N/A 6/4/2019    Performed by Maribeth Matute MD at [facility]   • XI ROBOT-ASSISTED LAPAROSCOPIC RIGHT COLON RESECTION Right 1/20/2021    Performed by Viktor Hammond MD at [address] [place] [address] None    AM-PAC Score:  Score: 24  Approx Degree of Impairment: 0%  Standardized Score (AM-PAC Scale): 57.54  CMS Modifier (G-Code): CH    FUNCTIONAL TRANSFER ASSESSMENT  Supine to Sit : Minimum assistance  Sit to Stand: Independent    Skilled Therapy Provi Complexity  Occupational Profile/Medical History  LOW   Specific performance deficits impacting engagement in ADL/IADL  LOW   Client Assessment/Performance Deficits  LOW   Clinical Decision Making  LOW   Overall Complexity  LOW     OT Discharge Recommendat

## 2021-01-21 NOTE — PLAN OF CARE
Alert and oriented  PRN pain medication effective for pain control  Tolerating IV ABT  Up and urinating, ambulating to the bathroom  Incisions are clean/dry/intact. No nausea or vomiting.     Problem: Diabetes/Glucose Control  Goal: Glucose maintained with

## 2021-01-21 NOTE — RESPIRATORY THERAPY NOTE
PT IS A RISK FOR SLEEP APNEA PER BERLING QUESTIONNAIRE, STANDING ORDERS FOR [place] PLACED IN EFFECT PER PROTOCOL TO MONITOR.

## 2021-01-22 VITALS
HEART RATE: 80 BPM | OXYGEN SATURATION: 98 % | WEIGHT: 235.44 LBS | RESPIRATION RATE: 18 BRPM | SYSTOLIC BLOOD PRESSURE: 144 MMHG | BODY MASS INDEX: 39 KG/M2 | DIASTOLIC BLOOD PRESSURE: 68 MMHG | TEMPERATURE: 98 F

## 2021-01-22 LAB
GLUCOSE BLD-MCNC: 144 MG/DL (ref 70–99)
GLUCOSE BLD-MCNC: 159 MG/DL (ref 70–99)
PLATELET # BLD AUTO: 215 10(3)UL (ref 150–450)
POTASSIUM SERPL-SCNC: 4.1 MMOL/L (ref 3.5–5.1)

## 2021-01-22 PROCEDURE — 84132 ASSAY OF SERUM POTASSIUM: CPT | Performed by: SURGERY

## 2021-01-22 PROCEDURE — 85049 AUTOMATED PLATELET COUNT: CPT

## 2021-01-22 PROCEDURE — 82962 GLUCOSE BLOOD TEST: CPT

## 2021-01-22 RX ORDER — POLYETHYLENE GLYCOL 3350 17 G/17G
17 POWDER, FOR SOLUTION ORAL DAILY
Status: DISCONTINUED | OUTPATIENT
Start: 2021-01-22 | End: 2021-01-22

## 2021-01-22 RX ORDER — BISACODYL 10 MG
10 SUPPOSITORY, RECTAL RECTAL
Status: DISCONTINUED | OUTPATIENT
Start: 2021-01-22 | End: 2021-01-22

## 2021-01-22 NOTE — PROGRESS NOTES
NURSING DISCHARGE NOTE    Discharged Home via Wheelchair. Accompanied by Support staff  Belongings Taken by patient/family     VERBAL AND WRITTEN DISCHARGE INSTRUCTIONS GIVEN TO PATIENT. VERBALIZED UNDERSTANDING. WITH TOLERABLE PAIN. TO HOME IN STABLE CO

## 2021-01-22 NOTE — PROGRESS NOTES
[facility] Hospitalist Progress Note                                                                   [address] F[address]seca  1/3/1970    CC: FU post op    Interval History:  - Doing better today, passing Skin warm and dry. No rashes or lesions.   Psych: AAO x 3, with appropriate affect  Neuro: no new focal neuro deficits noted on my exam    Pertinent Labs:   Recent Labs   Lab 01/17/21  1024 01/17/21  1125 01/21/21  0737 01/22/21  0712   RBC 5.51* 5.65* 4.53 prn     Gerd  -famotidine      Quality:  · DVT Prophylaxis: lovenox  · CODE status: Full per Chart       Doing well Ok for DC from IM standpoint.     Lavelle Irizarry MD  [facility] Hospitalist  Pager: [phone]

## 2021-01-22 NOTE — PROGRESS NOTES
1400-CORNELIA MCGEE AWARE OF PATIENT FEELING BLOATED ,NO FLATUS AND NOT READY TO BE DISCHARGED HOME     1530--AMBULATED IN HALLWAY WITH WALKER. USE OF I.S. ENCOURAGED.    1700--PATIENT STILL BLOATED ,NO FLATUS , [address] [place] [address] NOTIFIED . DISCHARGE HELD

## 2021-01-22 NOTE — DIETARY NOTE
[facility]   CLINICAL NUTRITION    Zondra Hammans     Admitting diagnosis:  Colonic mass [K63.89]    Ht: 5'5\"   Wt: 106.8 kg (235 lb 7.2 oz). Body mass index is 39.18 kg/m².   IBW: 57kg  Wt Readings from Last 6 Encounters:  01/20/21 : 106.8 kg (235 l

## 2021-01-22 NOTE — PLAN OF CARE
Problem: Diabetes/Glucose Control  Goal: Glucose maintained within prescribed range  Description: INTERVENTIONS:  - Monitor Blood Glucose as ordered  - Assess for signs and symptoms of hyperglycemia and hypoglycemia  - Administer ordered medications to m Discharge  Goal: Maintains adequate nutritional intake (undernourished)  Description: INTERVENTIONS:  - Monitor percentage of each meal consumed  - Identify factors contributing to decreased intake, treat as appropriate  - Assist with meals as needed  - Mo Tamia Marley TOLERATED DIET. TOLERATED SHOWER. UP IN ROOM AND HALLWAY. USE OF I.S ENCOURAGED Plan  OF CARE DISCUSSED WITH PATIENT .

## 2021-01-22 NOTE — PROGRESS NOTES
[facility]  Progress Note    Naheed Gill Patient Status:  Inpatient    1/3/1970 MRN [id number]   [facility] 3NW-A Attending Gorge Luevano MD   Hosp Day # 2 PCP LUZ ZAMORA,      Subjective:    Patient tolerating PO diet  P

## 2021-01-22 NOTE — PLAN OF CARE
Pt a/ox4, 1L O2, NSR on telemetry, c/o abd pain meds given per mar w/relief. Pt states she is bloated. Denies passing gas. ERAS. Tolerating soft diet, denies n/v at this time. Accu QID. IV TKO w/abx. Lap x6 w/skin glue -cdi. Pt complaint w/poc.  Call light effectiveness of GI medications  - Encourage mobilization and activity  - Obtain nutritional consult as needed  - Establish a toileting routine/schedule  - Consider collaborating with pharmacy to review patient's medication profile  Outcome: Progressing  G assistive devices as appropriate  - Consider OT/PT consult to assist with strengthening/mobility  - Encourage toileting schedule  Outcome: Progressing

## 2021-01-25 ENCOUNTER — PATIENT OUTREACH (OUTPATIENT)
Dept: CASE MANAGEMENT | Age: 51
End: 2021-01-25

## 2021-01-25 DIAGNOSIS — Z02.9 ENCOUNTERS FOR ADMINISTRATIVE PURPOSE: ICD-10-CM

## 2021-01-25 NOTE — PROGRESS NOTES
Initial Post Discharge Follow Up   Discharge Date: 1/22/21  Contact Date: 1/25/2021    Consent Verification:  Assessment Completed With: Patient  HIPAA Verified?   Yes    Discharge Dx:   Colonic mass    Was TCC ordered: no      General:   • How have you 90 tablet 0   • ALPRAZolam 0.5 MG Oral Tab Take 1 tablet (0.5 mg total) by mouth 3 (three) times daily as needed. 60 tablet 1   • Clobetasol Propionate 0.05 % External Ointment Apply 1 Application topically 2 (two) times daily.  60 g 0   • Nystatin [id number] U missing anything? yes  • Are there any reasons that keep you from taking your medication as prescribed? No  Are you having any concerns with constipation? No    Referrals/orders at D/C:  Home Health/Services ordered at D/C? No    DME ordered at D/C? Yes the or any new or worsening symptoms. She states that she still has pain but does not like to take the Norco and will try Ibuprofen. NCM instructed on s/s of infection, which she denied having. She states that her bs this morning was 118 and bp was 120/80.  She

## 2021-01-26 LAB — CREAT BLD-MCNC: 0.5 MG/DL

## 2021-02-05 NOTE — DISCHARGE SUMMARY
[facility]  Discharge Summary    Charmayne Stacey Patient Status:  Inpatient    1/3/1970 MRN [id number]   [facility] 3[facility]-A Attending No att. providers found   Hosp Day # 2 PCP LUZ ZAMORA DO     Date of Admission: 2021    Connor 6/19/2018    Levothyroxine Sodium 88 MCG Oral Tab  Take 1 tablet (88 mcg total) by mouth every morning before breakfast., Normal, Disp-90 tablet, R-0    ALPRAZolam 0.5 MG Oral Tab  Take 1 tablet (0.5 mg total) by mouth 3 (three) times daily as needed., Nor 2 (two) times daily before meals. , Normal, Disp-100 each, R-0    !! - Potential duplicate medications found. Please discuss with provider.       STOP taking these medications    acetaminophen 500 MG Oral Tab    PEG 3350-KCl-NaBcb-NaCl-NaSulf (PEG-3350/ELECT

## 2021-03-12 DIAGNOSIS — Z23 NEED FOR VACCINATION: ICD-10-CM

## 2021-03-17 RX ORDER — LEVOTHYROXINE SODIUM 88 UG/1
TABLET ORAL
Qty: 90 TABLET | Refills: 0 | Status: SHIPPED | OUTPATIENT
Start: 2021-03-17 | End: 2021-06-06

## 2021-03-27 DIAGNOSIS — F41.1 GENERALIZED ANXIETY DISORDER: ICD-10-CM

## 2021-03-27 DIAGNOSIS — E11.65 UNCONTROLLED TYPE 2 DIABETES MELLITUS WITH MICROALBUMINURIA, WITHOUT LONG-TERM CURRENT USE OF INSULIN (HCC): ICD-10-CM

## 2021-03-27 DIAGNOSIS — R80.9 UNCONTROLLED TYPE 2 DIABETES MELLITUS WITH MICROALBUMINURIA, WITHOUT LONG-TERM CURRENT USE OF INSULIN (HCC): ICD-10-CM

## 2021-03-27 DIAGNOSIS — E11.29 UNCONTROLLED TYPE 2 DIABETES MELLITUS WITH MICROALBUMINURIA, WITHOUT LONG-TERM CURRENT USE OF INSULIN (HCC): ICD-10-CM

## 2021-03-29 RX ORDER — ALPRAZOLAM 0.5 MG/1
0.5 TABLET ORAL 3 TIMES DAILY PRN
Qty: 60 TABLET | Refills: 1 | Status: SHIPPED | OUTPATIENT
Start: 2021-03-29 | End: 2021-06-06

## 2021-03-29 NOTE — TELEPHONE ENCOUNTER
Last time medication was refilled 12/2020  Quantity -90-days  Last OV 12/20/20  Needs an OV for diabetes

## 2021-04-16 ENCOUNTER — OFFICE VISIT (OUTPATIENT)
Dept: OBGYN CLINIC | Facility: CLINIC | Age: 51
End: 2021-04-16
Payer: COMMERCIAL

## 2021-04-16 VITALS
DIASTOLIC BLOOD PRESSURE: 80 MMHG | TEMPERATURE: 98 F | SYSTOLIC BLOOD PRESSURE: 144 MMHG | RESPIRATION RATE: 12 BRPM | WEIGHT: 242 LBS | HEART RATE: 84 BPM | BODY MASS INDEX: 40.32 KG/M2 | HEIGHT: 65 IN

## 2021-04-16 DIAGNOSIS — Z12.31 BREAST CANCER SCREENING BY MAMMOGRAM: ICD-10-CM

## 2021-04-16 DIAGNOSIS — Z01.419 ENCOUNTER FOR ANNUAL ROUTINE GYNECOLOGICAL EXAMINATION: Primary | ICD-10-CM

## 2021-04-16 DIAGNOSIS — Z11.51 SCREENING FOR HUMAN PAPILLOMAVIRUS: ICD-10-CM

## 2021-04-16 DIAGNOSIS — N90.4 LICHEN SCLEROSUS ET ATROPHICUS OF THE VULVA: ICD-10-CM

## 2021-04-16 DIAGNOSIS — Z00.00 LABORATORY EXAM ORDERED AS PART OF ROUTINE GENERAL MEDICAL EXAMINATION: ICD-10-CM

## 2021-04-16 DIAGNOSIS — Z12.4 SCREENING FOR MALIGNANT NEOPLASM OF CERVIX: ICD-10-CM

## 2021-04-16 PROCEDURE — 88175 CYTOPATH C/V AUTO FLUID REDO: CPT | Performed by: OBSTETRICS & GYNECOLOGY

## 2021-04-16 PROCEDURE — 3077F SYST BP >= 140 MM HG: CPT | Performed by: OBSTETRICS & GYNECOLOGY

## 2021-04-16 PROCEDURE — 3008F BODY MASS INDEX DOCD: CPT | Performed by: OBSTETRICS & GYNECOLOGY

## 2021-04-16 PROCEDURE — 99396 PREV VISIT EST AGE 40-64: CPT | Performed by: OBSTETRICS & GYNECOLOGY

## 2021-04-16 PROCEDURE — 3079F DIAST BP 80-89 MM HG: CPT | Performed by: OBSTETRICS & GYNECOLOGY

## 2021-04-16 PROCEDURE — 87624 HPV HI-RISK TYP POOLED RSLT: CPT | Performed by: OBSTETRICS & GYNECOLOGY

## 2021-04-16 RX ORDER — CLOBETASOL PROPIONATE 0.5 MG/G
1 OINTMENT TOPICAL
Qty: 45 G | Refills: 0 | Status: SHIPPED | OUTPATIENT
Start: 2021-04-19

## 2021-04-16 NOTE — PROGRESS NOTES
HPI:   Damien Stanley is a 46year old  who presents for an annual gynecological exam.   Menses: No LMP recorded. Patient has had an ablation. Menopause: postmenopausal  Had no bleeding since ablation done in . Denies hot flashes.   No spotting o acetonide 0.1 % External Cream Apply topically 2 (two) times daily as needed. 60 g 1   • traMADol HCl 50 MG Oral Tab Take 1 tablet (50 mg total) by mouth every 6 (six) hours as needed for Pain.  (Patient not taking: Reported on 1/25/2021 ) 30 tablet 0   • B Alcohol use: Never    Drug use: Never       REVIEW OF SYSTEMS:   CONSTITUTIONAL: generally feels well   SKIN: denies any unusual skin lesions, rash, itchiness  HEENT: denies nasal congestion, sinus pain or sore throat; denies blurred vision, visual changes maintain good physical health. · We discussed maintaining a healthy diet heavy in fruits and vegetables. ·  I encouraged the pt to stay away from fast foods and fried foods.   ·  I encouraged pt to incorporate milk products into her diet to ensure adequat LIPID PANEL; Future  -     ASSAY, THYROID STIM HORMONE; Future  -     VITAMIN D, 59-ORLXIYK; Future    Lichen sclerosus et atrophicus of the vulva  -     Clobetasol Propionate 0.05 % External Ointment; Apply 1 Application topically twice a week.

## 2021-04-16 NOTE — PROGRESS NOTES
Pt here for pe/pap.   Rustam Dudley 89 2014  Last pap:1/7/20 negative  Last mammogram;2/24/20 negative   Birth control: N/A

## 2021-04-21 NOTE — PROGRESS NOTES
Mehul Stone,   Your pap smear results are normal. I encourage you to learn more about each test by accessing the great resources available through [employer].  Click on the test name if you would like to see a description of the test. I hope this information is

## 2021-04-24 ENCOUNTER — IMMUNIZATION (OUTPATIENT)
Dept: LAB | Age: 51
End: 2021-04-24
Attending: HOSPITALIST
Payer: COMMERCIAL

## 2021-04-24 DIAGNOSIS — Z23 NEED FOR VACCINATION: Primary | ICD-10-CM

## 2021-04-24 PROCEDURE — 0001A SARSCOV2 VAC 30MCG/0.3ML IM: CPT

## 2021-05-18 ENCOUNTER — IMMUNIZATION (OUTPATIENT)
Dept: LAB | Age: 51
End: 2021-05-18
Attending: HOSPITALIST
Payer: COMMERCIAL

## 2021-05-18 DIAGNOSIS — Z23 NEED FOR VACCINATION: Primary | ICD-10-CM

## 2021-05-18 PROCEDURE — 0002A SARSCOV2 VAC 30MCG/0.3ML IM: CPT

## 2021-06-06 DIAGNOSIS — E66.9 DIABETES MELLITUS TYPE 2 IN OBESE (HCC): ICD-10-CM

## 2021-06-06 DIAGNOSIS — E11.69 DIABETES MELLITUS TYPE 2 IN OBESE (HCC): ICD-10-CM

## 2021-06-06 DIAGNOSIS — F41.1 GENERALIZED ANXIETY DISORDER: ICD-10-CM

## 2021-06-07 ENCOUNTER — PATIENT MESSAGE (OUTPATIENT)
Dept: FAMILY MEDICINE CLINIC | Facility: CLINIC | Age: 51
End: 2021-06-07

## 2021-06-07 ENCOUNTER — NURSE ONLY (OUTPATIENT)
Dept: ENDOCRINOLOGY CLINIC | Facility: CLINIC | Age: 51
End: 2021-06-07
Payer: COMMERCIAL

## 2021-06-07 ENCOUNTER — PATIENT MESSAGE (OUTPATIENT)
Dept: ENDOCRINOLOGY CLINIC | Facility: CLINIC | Age: 51
End: 2021-06-07

## 2021-06-07 DIAGNOSIS — Z02.9 ADMINISTRATIVE ENCOUNTER: ICD-10-CM

## 2021-06-07 DIAGNOSIS — E11.69 DIABETES MELLITUS TYPE 2 IN OBESE (HCC): Primary | ICD-10-CM

## 2021-06-07 DIAGNOSIS — Z79.4 TYPE 2 DIABETES MELLITUS WITH HYPERGLYCEMIA, WITH LONG-TERM CURRENT USE OF INSULIN (HCC): Primary | ICD-10-CM

## 2021-06-07 DIAGNOSIS — E66.9 DIABETES MELLITUS TYPE 2 IN OBESE (HCC): Primary | ICD-10-CM

## 2021-06-07 DIAGNOSIS — E11.65 TYPE 2 DIABETES MELLITUS WITH HYPERGLYCEMIA, WITH LONG-TERM CURRENT USE OF INSULIN (HCC): Primary | ICD-10-CM

## 2021-06-07 RX ORDER — INSULIN DEGLUDEC INJECTION 100 U/ML
INJECTION, SOLUTION SUBCUTANEOUS
Qty: 15 ML | Refills: 0 | Status: SHIPPED | OUTPATIENT
Start: 2021-06-07 | End: 2021-08-16

## 2021-06-07 RX ORDER — NYSTATIN 100000 [USP'U]/G
1 POWDER TOPICAL 3 TIMES DAILY
Qty: 60 G | Refills: 1 | Status: SHIPPED | OUTPATIENT
Start: 2021-06-07 | End: 2021-08-16

## 2021-06-07 RX ORDER — INSULIN DEGLUDEC INJECTION 100 U/ML
INJECTION, SOLUTION SUBCUTANEOUS
Qty: 3 ML | Refills: 0 | COMMUNITY
Start: 2021-06-07

## 2021-06-07 RX ORDER — INSULIN LISPRO 100 [IU]/ML
INJECTION, SOLUTION INTRAVENOUS; SUBCUTANEOUS
Qty: 1 EACH | Refills: 0 | COMMUNITY
Start: 2021-06-07

## 2021-06-07 RX ORDER — BLOOD SUGAR DIAGNOSTIC
STRIP MISCELLANEOUS
Qty: 100 STRIP | Refills: 0 | OUTPATIENT
Start: 2021-06-07

## 2021-06-07 RX ORDER — INSULIN ASPART 100 [IU]/ML
INJECTION, SOLUTION INTRAVENOUS; SUBCUTANEOUS
Qty: 15 ML | Refills: 0 | Status: SHIPPED | OUTPATIENT
Start: 2021-06-07 | End: 2021-08-16

## 2021-06-07 NOTE — PROGRESS NOTES
Patient contacted nurse today about high blood sugars - self stopped her insulin   Feeing fatigue, + unintentional weight loss   Last DM appt 4-2020 -       Refusing to go to ER  Nurse provided meter today and BG high     Needs to restart insulin   Óscar Loss

## 2021-06-07 NOTE — TELEPHONE ENCOUNTER
From: Ivelisse Links  To: MAYITO Lozoya  Sent: 6/7/2021 8:14 AM CDT  Subject: Prescription Question    Hi. My sugars have been above 200-300. Not taking insulin. Metformin. I sent a message to my primary. Need strips or a new machine.  I need to find out

## 2021-06-07 NOTE — TELEPHONE ENCOUNTER
From: Luna Valentin  To: LUZ ZAMORA DO  Sent: 6/7/2021 8:12 AM CDT  Subject: Prescription Question    Sugar was high 300 yesterday. Only taking metformin. No strips to check.  Need strips ASAP and to find out what to do now as far as diabetes

## 2021-06-07 NOTE — TELEPHONE ENCOUNTER
Call with Jessica Winslow. She doesn't have a meter to test her BG (getting an error). Pt was \"feeling high\" yesterday, BG was 345. Drank water and walked, dropped to 245.     Pt will come to office for sample meter this morning at 10:30am.  Will test in office

## 2021-06-07 NOTE — PROGRESS NOTES
BG in office fasting was 350. Accuchek meter provided. Will send test strips and lancets to pharmacy    Last night BG max was 454, had to leave work. Was told to go to ER but pt refused. Adenike Chandler will help schedule follow ups.

## 2021-06-09 ENCOUNTER — PATIENT MESSAGE (OUTPATIENT)
Dept: ENDOCRINOLOGY CLINIC | Facility: CLINIC | Age: 51
End: 2021-06-09

## 2021-06-09 RX ORDER — CALCIUM CITRATE/VITAMIN D3 200MG-6.25
TABLET ORAL
Qty: 200 STRIP | Refills: 3 | Status: SHIPPED | OUTPATIENT
Start: 2021-06-09

## 2021-06-09 NOTE — TELEPHONE ENCOUNTER
From: Luis Connor  To: Alvah Curling, APRN  Sent: 6/9/2021 8:09 AM CDT  Subject: Prescription Question    I need strips. I can’t check my sugar this morning it’s fir the accuchek guide. I need them ASAP. Please.  I need to give my insulin

## 2021-06-11 RX ORDER — LEVOTHYROXINE SODIUM 88 UG/1
88 TABLET ORAL
Qty: 90 TABLET | Refills: 0 | Status: SHIPPED | OUTPATIENT
Start: 2021-06-11 | End: 2021-08-16

## 2021-06-11 RX ORDER — CALCIUM CITRATE/VITAMIN D3 200MG-6.25
TABLET ORAL
Qty: 400 STRIP | Refills: 1 | Status: SHIPPED | OUTPATIENT
Start: 2021-06-11 | End: 2021-08-16

## 2021-06-12 RX ORDER — ALPRAZOLAM 0.5 MG/1
0.5 TABLET ORAL 3 TIMES DAILY PRN
Qty: 60 TABLET | Refills: 1 | Status: SHIPPED | OUTPATIENT
Start: 2021-06-12 | End: 2021-06-18

## 2021-06-18 ENCOUNTER — MED REC SCAN ONLY (OUTPATIENT)
Dept: FAMILY MEDICINE CLINIC | Facility: CLINIC | Age: 51
End: 2021-06-18

## 2021-06-18 ENCOUNTER — TELEMEDICINE (OUTPATIENT)
Dept: FAMILY MEDICINE CLINIC | Facility: CLINIC | Age: 51
End: 2021-06-18

## 2021-06-18 DIAGNOSIS — I10 ESSENTIAL HYPERTENSION WITH GOAL BLOOD PRESSURE LESS THAN 130/80: ICD-10-CM

## 2021-06-18 DIAGNOSIS — F41.1 GENERALIZED ANXIETY DISORDER: ICD-10-CM

## 2021-06-18 DIAGNOSIS — E78.2 MIXED HYPERLIPIDEMIA: ICD-10-CM

## 2021-06-18 DIAGNOSIS — E03.9 ACQUIRED HYPOTHYROIDISM: ICD-10-CM

## 2021-06-18 DIAGNOSIS — R06.83 SNORING: ICD-10-CM

## 2021-06-18 DIAGNOSIS — R80.9 UNCONTROLLED TYPE 2 DIABETES MELLITUS WITH MICROALBUMINURIA, WITHOUT LONG-TERM CURRENT USE OF INSULIN (HCC): Primary | ICD-10-CM

## 2021-06-18 DIAGNOSIS — E11.65 UNCONTROLLED TYPE 2 DIABETES MELLITUS WITH MICROALBUMINURIA, WITHOUT LONG-TERM CURRENT USE OF INSULIN (HCC): Primary | ICD-10-CM

## 2021-06-18 DIAGNOSIS — E11.29 UNCONTROLLED TYPE 2 DIABETES MELLITUS WITH MICROALBUMINURIA, WITHOUT LONG-TERM CURRENT USE OF INSULIN (HCC): Primary | ICD-10-CM

## 2021-06-18 DIAGNOSIS — G47.19 EXCESSIVE DAYTIME SLEEPINESS: ICD-10-CM

## 2021-06-18 PROCEDURE — 99214 OFFICE O/P EST MOD 30 MIN: CPT | Performed by: FAMILY MEDICINE

## 2021-06-18 RX ORDER — ASPIRIN 81 MG/1
81 TABLET, COATED ORAL DAILY
Qty: 90 TABLET | Refills: 3 | Status: SHIPPED | OUTPATIENT
Start: 2021-06-18

## 2021-06-18 RX ORDER — ALPRAZOLAM 0.5 MG/1
0.5 TABLET ORAL 3 TIMES DAILY PRN
Qty: 60 TABLET | Refills: 1 | Status: SHIPPED | OUTPATIENT
Start: 2021-06-18 | End: 2021-08-16

## 2021-06-18 RX ORDER — TRAMADOL HYDROCHLORIDE 50 MG/1
50 TABLET ORAL EVERY 6 HOURS PRN
Qty: 30 TABLET | Refills: 0 | Status: SHIPPED | OUTPATIENT
Start: 2021-06-18 | End: 2021-08-16

## 2021-06-18 RX ORDER — METOPROLOL SUCCINATE 50 MG/1
50 TABLET, EXTENDED RELEASE ORAL DAILY
Qty: 90 TABLET | Refills: 0 | Status: SHIPPED | OUTPATIENT
Start: 2021-06-18 | End: 2021-08-16

## 2021-06-18 NOTE — PROGRESS NOTES
Subjective     HPI:   Peggy Colunga verbally consents to a Virtual/Telephone Check-In service on 06/18/21. Patient understands and accepts financial responsibility for any deductible, co-insurance and/or co-pays associated with this service.  This visit is Medications Reviewed  Problem List Reviewed  Medical History Reviewed    Surgical History Reviewed  Family History Reviewed            REVIEW OF SYSTEMS:  Pertinent items are noted in HPI.              Physical Exam:  alert, appears stated age and cooperati hours as needed for Pain.          Follow up: 3 months  Time of visit: 12 min    [facility],

## 2021-06-21 DIAGNOSIS — E11.29 UNCONTROLLED TYPE 2 DIABETES MELLITUS WITH MICROALBUMINURIA, WITHOUT LONG-TERM CURRENT USE OF INSULIN (HCC): ICD-10-CM

## 2021-06-21 DIAGNOSIS — R80.9 UNCONTROLLED TYPE 2 DIABETES MELLITUS WITH MICROALBUMINURIA, WITHOUT LONG-TERM CURRENT USE OF INSULIN (HCC): ICD-10-CM

## 2021-06-21 DIAGNOSIS — E11.65 UNCONTROLLED TYPE 2 DIABETES MELLITUS WITH MICROALBUMINURIA, WITHOUT LONG-TERM CURRENT USE OF INSULIN (HCC): ICD-10-CM

## 2021-06-22 ENCOUNTER — HOSPITAL ENCOUNTER (OUTPATIENT)
Dept: MAMMOGRAPHY | Facility: HOSPITAL | Age: 51
Discharge: HOME | End: 2021-06-22
Attending: OBSTETRICS & GYNECOLOGY
Payer: COMMERCIAL

## 2021-06-22 DIAGNOSIS — Z01.419 ENCOUNTER FOR ANNUAL ROUTINE GYNECOLOGICAL EXAMINATION: ICD-10-CM

## 2021-06-22 DIAGNOSIS — Z12.31 BREAST CANCER SCREENING BY MAMMOGRAM: ICD-10-CM

## 2021-06-22 PROCEDURE — 77063 BREAST TOMOSYNTHESIS BI: CPT | Performed by: OBSTETRICS & GYNECOLOGY

## 2021-06-22 PROCEDURE — 77067 SCR MAMMO BI INCL CAD: CPT | Performed by: OBSTETRICS & GYNECOLOGY

## 2021-06-23 DIAGNOSIS — E66.9 DIABETES MELLITUS TYPE 2 IN OBESE (HCC): ICD-10-CM

## 2021-06-23 DIAGNOSIS — E11.69 DIABETES MELLITUS TYPE 2 IN OBESE (HCC): ICD-10-CM

## 2021-06-23 RX ORDER — CALCIUM CITRATE/VITAMIN D3 200MG-6.25
TABLET ORAL
Qty: 200 STRIP | Refills: 3 | OUTPATIENT
Start: 2021-06-23

## 2021-06-23 NOTE — PROGRESS NOTES
Mehul Stone,   Your mammogram results are normal. I encourage you to learn more about each test by accessing the great resources available through [employer].  Click on the test name if you would like to see a description of the test. I hope this information is

## 2021-06-25 ENCOUNTER — DIABETIC EDUCATION (OUTPATIENT)
Dept: ENDOCRINOLOGY CLINIC | Facility: CLINIC | Age: 51
End: 2021-06-25
Payer: COMMERCIAL

## 2021-06-25 DIAGNOSIS — Z79.4 TYPE 2 DIABETES MELLITUS WITH HYPERGLYCEMIA, WITH LONG-TERM CURRENT USE OF INSULIN (HCC): Primary | ICD-10-CM

## 2021-06-25 DIAGNOSIS — E11.65 TYPE 2 DIABETES MELLITUS WITH HYPERGLYCEMIA, WITH LONG-TERM CURRENT USE OF INSULIN (HCC): Primary | ICD-10-CM

## 2021-06-25 PROCEDURE — 97802 MEDICAL NUTRITION INDIV IN: CPT | Performed by: DIETITIAN, REGISTERED

## 2021-06-25 RX ORDER — BLOOD SUGAR DIAGNOSTIC
1 STRIP MISCELLANEOUS 4 TIMES DAILY
Qty: 100 EACH | Refills: 3 | Status: SHIPPED | OUTPATIENT
Start: 2021-06-25

## 2021-06-25 NOTE — PROGRESS NOTES
Alisa Ashford   1/3/1970 was seen for Diabetes Education Follow-Up:    6/25/2021  Referring Provider: Marcio Sample  Start time: 8:30 End time: 9:45    HEMOGLOBIN A1C (%)   Date Value   09/11/2019 11.2 (A)     HgbA1C (%)   Date Value   01/17/2021 8.5 (H) ago.    Rick Garcia examples of heart healthy, balanced meals with 45 grams of carbohydrate/meal.     Reviewed principles for heart healthy diet: reduced saturated and trans fat, reduced sodium and high fiber.     Exercise: Has increased her walking (20-30 min/day

## 2021-07-06 RX ORDER — BLOOD SUGAR DIAGNOSTIC
STRIP MISCELLANEOUS
Qty: 100 STRIP | Refills: 11 | OUTPATIENT
Start: 2021-07-06 | End: 2022-06-23

## 2021-07-11 ENCOUNTER — HOSPITAL ENCOUNTER (EMERGENCY)
Facility: HOSPITAL | Age: 51
Discharge: HOME | End: 2021-07-12
Attending: EMERGENCY MEDICINE
Payer: COMMERCIAL

## 2021-07-11 DIAGNOSIS — I47.1 SVT (SUPRAVENTRICULAR TACHYCARDIA) (HCC): Primary | ICD-10-CM

## 2021-07-11 PROCEDURE — 99285 EMERGENCY DEPT VISIT HI MDM: CPT

## 2021-07-11 PROCEDURE — 93010 ELECTROCARDIOGRAM REPORT: CPT

## 2021-07-11 PROCEDURE — 96374 THER/PROPH/DIAG INJ IV PUSH: CPT

## 2021-07-11 PROCEDURE — 93005 ELECTROCARDIOGRAM TRACING: CPT

## 2021-07-11 PROCEDURE — 96375 TX/PRO/DX INJ NEW DRUG ADDON: CPT

## 2021-07-11 RX ORDER — ADENOSINE 3 MG/ML
6 INJECTION, SOLUTION INTRAVENOUS ONCE
Status: COMPLETED | OUTPATIENT
Start: 2021-07-11 | End: 2021-07-12

## 2021-07-11 RX ORDER — METOPROLOL SUCCINATE 50 MG/1
50 TABLET, EXTENDED RELEASE ORAL
Status: DISCONTINUED | OUTPATIENT
Start: 2021-07-12 | End: 2021-07-12

## 2021-07-12 VITALS
HEART RATE: 96 BPM | DIASTOLIC BLOOD PRESSURE: 76 MMHG | BODY MASS INDEX: 36.65 KG/M2 | OXYGEN SATURATION: 96 % | RESPIRATION RATE: 24 BRPM | HEIGHT: 65 IN | SYSTOLIC BLOOD PRESSURE: 114 MMHG | TEMPERATURE: 98 F | WEIGHT: 220 LBS

## 2021-07-12 LAB
ALBUMIN SERPL-MCNC: 3.6 G/DL (ref 3.4–5)
ALBUMIN/GLOB SERPL: 0.8 {RATIO} (ref 1–2)
ALP LIVER SERPL-CCNC: 105 U/L
ALT SERPL-CCNC: 23 U/L
ANION GAP SERPL CALC-SCNC: 7 MMOL/L (ref 0–18)
AST SERPL-CCNC: 22 U/L (ref 15–37)
ATRIAL RATE: 111 BPM
ATRIAL RATE: 208 BPM
ATRIAL RATE: 93 BPM
BASOPHILS # BLD AUTO: 0.04 X10(3) UL (ref 0–0.2)
BASOPHILS NFR BLD AUTO: 0.3 %
BILIRUB SERPL-MCNC: 0.3 MG/DL (ref 0.1–2)
BUN BLD-MCNC: 12 MG/DL (ref 7–18)
BUN/CREAT SERPL: 16 (ref 10–20)
CALCIUM BLD-MCNC: 8.4 MG/DL (ref 8.5–10.1)
CHLORIDE SERPL-SCNC: 100 MMOL/L (ref 98–112)
CO2 SERPL-SCNC: 25 MMOL/L (ref 21–32)
CREAT BLD-MCNC: 0.75 MG/DL
DEPRECATED RDW RBC AUTO: 38.6 FL (ref 35.1–46.3)
EOSINOPHIL # BLD AUTO: 0.29 X10(3) UL (ref 0–0.7)
EOSINOPHIL NFR BLD AUTO: 2.4 %
ERYTHROCYTE [DISTWIDTH] IN BLOOD BY AUTOMATED COUNT: 13.5 % (ref 11–15)
GLOBULIN PLAS-MCNC: 4.5 G/DL (ref 2.8–4.4)
GLUCOSE BLD-MCNC: 332 MG/DL (ref 70–99)
HCT VFR BLD AUTO: 41.9 %
HGB BLD-MCNC: 13.9 G/DL
IMM GRANULOCYTES # BLD AUTO: 0.04 X10(3) UL (ref 0–1)
IMM GRANULOCYTES NFR BLD: 0.3 %
LYMPHOCYTES # BLD AUTO: 3.12 X10(3) UL (ref 1–4)
LYMPHOCYTES NFR BLD AUTO: 25.3 %
M PROTEIN MFR SERPL ELPH: 8.1 G/DL (ref 6.4–8.2)
MCH RBC QN AUTO: 26.4 PG (ref 26–34)
MCHC RBC AUTO-ENTMCNC: 33.2 G/DL (ref 31–37)
MCV RBC AUTO: 79.7 FL
MONOCYTES # BLD AUTO: 1.18 X10(3) UL (ref 0.1–1)
MONOCYTES NFR BLD AUTO: 9.6 %
NEUTROPHILS # BLD AUTO: 7.65 X10 (3) UL (ref 1.5–7.7)
NEUTROPHILS # BLD AUTO: 7.65 X10(3) UL (ref 1.5–7.7)
NEUTROPHILS NFR BLD AUTO: 62.1 %
OSMOLALITY SERPL CALC.SUM OF ELEC: 287 MOSM/KG (ref 275–295)
P AXIS: 59 DEGREES
P AXIS: 64 DEGREES
P-R INTERVAL: 146 MS
P-R INTERVAL: 148 MS
PLATELET # BLD AUTO: 312 10(3)UL (ref 150–450)
POTASSIUM SERPL-SCNC: 3.7 MMOL/L (ref 3.5–5.1)
Q-T INTERVAL: 220 MS
Q-T INTERVAL: 318 MS
Q-T INTERVAL: 378 MS
QRS DURATION: 82 MS
QRS DURATION: 82 MS
QRS DURATION: 90 MS
QTC CALCULATION (BEZET): 407 MS
QTC CALCULATION (BEZET): 432 MS
QTC CALCULATION (BEZET): 469 MS
R AXIS: 82 DEGREES
R AXIS: 83 DEGREES
R AXIS: 85 DEGREES
RBC # BLD AUTO: 5.26 X10(6)UL
SODIUM SERPL-SCNC: 132 MMOL/L (ref 136–145)
T AXIS: 266 DEGREES
T AXIS: 41 DEGREES
T AXIS: 69 DEGREES
T4 FREE SERPL-MCNC: 0.7 NG/DL (ref 0.8–1.7)
TROPONIN I SERPL-MCNC: <0.045 NG/ML (ref ?–0.04)
TSI SER-ACNC: 8.51 MIU/ML (ref 0.36–3.74)
VENTRICULAR RATE: 111 BPM
VENTRICULAR RATE: 206 BPM
VENTRICULAR RATE: 93 BPM
WBC # BLD AUTO: 12.3 X10(3) UL (ref 4–11)

## 2021-07-12 PROCEDURE — 84443 ASSAY THYROID STIM HORMONE: CPT | Performed by: EMERGENCY MEDICINE

## 2021-07-12 PROCEDURE — 93005 ELECTROCARDIOGRAM TRACING: CPT

## 2021-07-12 PROCEDURE — 84484 ASSAY OF TROPONIN QUANT: CPT | Performed by: EMERGENCY MEDICINE

## 2021-07-12 PROCEDURE — 85025 COMPLETE CBC W/AUTO DIFF WBC: CPT | Performed by: EMERGENCY MEDICINE

## 2021-07-12 PROCEDURE — 80053 COMPREHEN METABOLIC PANEL: CPT | Performed by: EMERGENCY MEDICINE

## 2021-07-12 PROCEDURE — 84439 ASSAY OF FREE THYROXINE: CPT | Performed by: EMERGENCY MEDICINE

## 2021-07-12 RX ORDER — ADENOSINE 3 MG/ML
INJECTION, SOLUTION INTRAVENOUS
Status: COMPLETED
Start: 2021-07-12 | End: 2021-07-12

## 2021-07-12 RX ORDER — METOPROLOL TARTRATE 5 MG/5ML
5 INJECTION INTRAVENOUS ONCE
Status: COMPLETED | OUTPATIENT
Start: 2021-07-12 | End: 2021-07-12

## 2021-07-12 RX ORDER — METOPROLOL TARTRATE 5 MG/5ML
INJECTION INTRAVENOUS
Status: COMPLETED
Start: 2021-07-12 | End: 2021-07-12

## 2021-07-12 RX ORDER — ADENOSINE 3 MG/ML
12 INJECTION, SOLUTION INTRAVENOUS ONCE
Status: COMPLETED | OUTPATIENT
Start: 2021-07-12 | End: 2021-07-12

## 2021-07-12 NOTE — ED PROVIDER NOTES
Patient Seen in: [facility] Emergency Department      History   Patient presents with:  Arrythmia/Palpitations    Stated Complaint: svt    HPI/Subjective:   HPI    Patient is a pleasant 47-year-old female, with history of SVT, presenting for evaluat tobacco: Never Used      Tobacco comment: QUIT ABOUT 15 YEARS AGO PER PT REPORT    Vaping Use      Vaping Use: Never used    Alcohol use: Never    Drug use: Never             Review of Systems    Positive for stated complaint: svt  Other systems are as not the following components:    WBC 12.3 (*)     MCV 79.7 (*)     Monocyte Absolute 1.18 (*)     All other components within normal limits   TROPONIN I - Normal   CBC WITH DIFFERENTIAL WITH PLATELET    Narrative:      The following orders were created for pane well.  Additional evaluation and intervention may be required, depending on the patient's clinical course. Patient was invited to return for reevaluation of any problems, worsening symptoms, or as discussed.     Patient verbalizes understanding and is comf

## 2021-07-25 ENCOUNTER — HOSPITAL ENCOUNTER (EMERGENCY)
Facility: HOSPITAL | Age: 51
Discharge: HOME | End: 2021-07-25
Attending: EMERGENCY MEDICINE
Payer: COMMERCIAL

## 2021-07-25 VITALS
TEMPERATURE: 98 F | DIASTOLIC BLOOD PRESSURE: 71 MMHG | RESPIRATION RATE: 18 BRPM | BODY MASS INDEX: 38.32 KG/M2 | WEIGHT: 230 LBS | OXYGEN SATURATION: 99 % | HEIGHT: 65 IN | SYSTOLIC BLOOD PRESSURE: 121 MMHG | HEART RATE: 79 BPM

## 2021-07-25 DIAGNOSIS — I47.1 SVT (SUPRAVENTRICULAR TACHYCARDIA) (HCC): Primary | ICD-10-CM

## 2021-07-25 LAB
ALBUMIN SERPL-MCNC: 3.4 G/DL (ref 3.4–5)
ALBUMIN/GLOB SERPL: 0.9 {RATIO} (ref 1–2)
ALP LIVER SERPL-CCNC: 101 U/L
ALT SERPL-CCNC: 23 U/L
ANION GAP SERPL CALC-SCNC: 6 MMOL/L (ref 0–18)
AST SERPL-CCNC: 9 U/L (ref 15–37)
BASOPHILS # BLD AUTO: 0.03 X10(3) UL (ref 0–0.2)
BASOPHILS NFR BLD AUTO: 0.3 %
BILIRUB SERPL-MCNC: 0.4 MG/DL (ref 0.1–2)
BUN BLD-MCNC: 16 MG/DL (ref 7–18)
BUN/CREAT SERPL: 17 (ref 10–20)
CALCIUM BLD-MCNC: 8.8 MG/DL (ref 8.5–10.1)
CHLORIDE SERPL-SCNC: 103 MMOL/L (ref 98–112)
CO2 SERPL-SCNC: 25 MMOL/L (ref 21–32)
CREAT BLD-MCNC: 0.94 MG/DL
DEPRECATED RDW RBC AUTO: 39.2 FL (ref 35.1–46.3)
EOSINOPHIL # BLD AUTO: 0.08 X10(3) UL (ref 0–0.7)
EOSINOPHIL NFR BLD AUTO: 0.8 %
ERYTHROCYTE [DISTWIDTH] IN BLOOD BY AUTOMATED COUNT: 13.7 % (ref 11–15)
GLOBULIN PLAS-MCNC: 4 G/DL (ref 2.8–4.4)
GLUCOSE BLD-MCNC: 396 MG/DL (ref 70–99)
HCT VFR BLD AUTO: 40.9 %
HGB BLD-MCNC: 13.7 G/DL
IMM GRANULOCYTES # BLD AUTO: 0.03 X10(3) UL (ref 0–1)
IMM GRANULOCYTES NFR BLD: 0.3 %
LYMPHOCYTES # BLD AUTO: 2.27 X10(3) UL (ref 1–4)
LYMPHOCYTES NFR BLD AUTO: 21.4 %
M PROTEIN MFR SERPL ELPH: 7.4 G/DL (ref 6.4–8.2)
MCH RBC QN AUTO: 26.8 PG (ref 26–34)
MCHC RBC AUTO-ENTMCNC: 33.5 G/DL (ref 31–37)
MCV RBC AUTO: 79.9 FL
MONOCYTES # BLD AUTO: 0.92 X10(3) UL (ref 0.1–1)
MONOCYTES NFR BLD AUTO: 8.7 %
NEUTROPHILS # BLD AUTO: 7.3 X10 (3) UL (ref 1.5–7.7)
NEUTROPHILS # BLD AUTO: 7.3 X10(3) UL (ref 1.5–7.7)
NEUTROPHILS NFR BLD AUTO: 68.5 %
OSMOLALITY SERPL CALC.SUM OF ELEC: 296 MOSM/KG (ref 275–295)
PLATELET # BLD AUTO: 276 10(3)UL (ref 150–450)
POTASSIUM SERPL-SCNC: 4.3 MMOL/L (ref 3.5–5.1)
RBC # BLD AUTO: 5.12 X10(6)UL
SODIUM SERPL-SCNC: 134 MMOL/L (ref 136–145)
T4 FREE SERPL-MCNC: 1 NG/DL (ref 0.8–1.7)
TROPONIN I SERPL-MCNC: <0.045 NG/ML (ref ?–0.04)
TSI SER-ACNC: 4.78 MIU/ML (ref 0.36–3.74)
WBC # BLD AUTO: 10.6 X10(3) UL (ref 4–11)

## 2021-07-25 PROCEDURE — 93010 ELECTROCARDIOGRAM REPORT: CPT

## 2021-07-25 PROCEDURE — 99285 EMERGENCY DEPT VISIT HI MDM: CPT

## 2021-07-25 PROCEDURE — 85025 COMPLETE CBC W/AUTO DIFF WBC: CPT | Performed by: EMERGENCY MEDICINE

## 2021-07-25 PROCEDURE — 84484 ASSAY OF TROPONIN QUANT: CPT | Performed by: EMERGENCY MEDICINE

## 2021-07-25 PROCEDURE — 96361 HYDRATE IV INFUSION ADD-ON: CPT

## 2021-07-25 PROCEDURE — 80053 COMPREHEN METABOLIC PANEL: CPT | Performed by: EMERGENCY MEDICINE

## 2021-07-25 PROCEDURE — 93005 ELECTROCARDIOGRAM TRACING: CPT

## 2021-07-25 PROCEDURE — 96374 THER/PROPH/DIAG INJ IV PUSH: CPT

## 2021-07-25 PROCEDURE — 84439 ASSAY OF FREE THYROXINE: CPT | Performed by: EMERGENCY MEDICINE

## 2021-07-25 PROCEDURE — 84443 ASSAY THYROID STIM HORMONE: CPT | Performed by: EMERGENCY MEDICINE

## 2021-07-25 RX ORDER — ADENOSINE 3 MG/ML
INJECTION, SOLUTION INTRAVENOUS
Status: COMPLETED
Start: 2021-07-25 | End: 2021-07-25

## 2021-07-25 RX ORDER — ADENOSINE 3 MG/ML
6 INJECTION, SOLUTION INTRAVENOUS ONCE
Status: COMPLETED | OUTPATIENT
Start: 2021-07-25 | End: 2021-07-25

## 2021-07-25 NOTE — ED INITIAL ASSESSMENT (HPI)
Patient here with a hx of SVT and had a HR in the 200's at home. Patient states her HR has been elevated since 1030.

## 2021-07-25 NOTE — ED PROVIDER NOTES
Patient Seen in: [facility] Emergency Department      History   Patient presents with:  Arrythmia/Palpitations    Stated Complaint: tachycardia x6 hours.   hx tachycardia, hasnt seen cardiologist since 2019 d/t c*    HPI/Subjective:   HPI    46-year- use: Never             Review of Systems    Positive for stated complaint: tachycardia x6 hours. hx tachycardia, hasnt seen cardiologist since 2019 d/t c*  Other systems are as noted in HPI. Constitutional and vital signs reviewed.       All other systems ------                     CBC W/ DIFFERENTIAL[[id number]]          Abnormal            Final result                 Please view results for these tests on the individual orders. EKG    Rate, intervals and axes as noted on EKG Report.   Rate: 186  Rhythm

## 2021-07-26 LAB
ATRIAL RATE: 220 BPM
ATRIAL RATE: 96 BPM
P AXIS: 61 DEGREES
P-R INTERVAL: 142 MS
Q-T INTERVAL: 256 MS
Q-T INTERVAL: 348 MS
QRS DURATION: 80 MS
QRS DURATION: 86 MS
QTC CALCULATION (BEZET): 439 MS
QTC CALCULATION (BEZET): 450 MS
R AXIS: 73 DEGREES
R AXIS: 81 DEGREES
T AXIS: -20 DEGREES
T AXIS: 30 DEGREES
VENTRICULAR RATE: 186 BPM
VENTRICULAR RATE: 96 BPM

## 2021-08-16 DIAGNOSIS — I10 ESSENTIAL HYPERTENSION WITH GOAL BLOOD PRESSURE LESS THAN 130/80: ICD-10-CM

## 2021-08-16 DIAGNOSIS — E11.65 UNCONTROLLED TYPE 2 DIABETES MELLITUS WITH MICROALBUMINURIA, WITHOUT LONG-TERM CURRENT USE OF INSULIN (HCC): ICD-10-CM

## 2021-08-16 DIAGNOSIS — R80.9 UNCONTROLLED TYPE 2 DIABETES MELLITUS WITH MICROALBUMINURIA, WITHOUT LONG-TERM CURRENT USE OF INSULIN (HCC): ICD-10-CM

## 2021-08-16 DIAGNOSIS — L20.9 ATOPIC DERMATITIS, UNSPECIFIED TYPE: ICD-10-CM

## 2021-08-16 DIAGNOSIS — F41.1 GENERALIZED ANXIETY DISORDER: ICD-10-CM

## 2021-08-16 DIAGNOSIS — E11.69 DIABETES MELLITUS TYPE 2 IN OBESE (HCC): ICD-10-CM

## 2021-08-16 DIAGNOSIS — E11.29 UNCONTROLLED TYPE 2 DIABETES MELLITUS WITH MICROALBUMINURIA, WITHOUT LONG-TERM CURRENT USE OF INSULIN (HCC): ICD-10-CM

## 2021-08-16 DIAGNOSIS — E66.9 DIABETES MELLITUS TYPE 2 IN OBESE (HCC): ICD-10-CM

## 2021-08-16 NOTE — TELEPHONE ENCOUNTER
Requested Prescriptions     Pending Prescriptions Disp Refills   • Insulin Degludec (TRESIBA FLEXTOUCH) 100 UNIT/ML Subcutaneous Solution Pen-injector 15 mL 0     Sig: Uses up to 20 units daily as directed   • Insulin Aspart Pen (NOVOLOG FLEXPEN) 100 UNIT/

## 2021-08-17 NOTE — TELEPHONE ENCOUNTER
Patient last seen by Diabetes APN : 5-2020   History of ongoing cancelled appointments in Diabetes center (w educators/APN)     Last refill granted for insulin 5-2021 and she cancelled appt 6- w APN   Defer refill to PCP office

## 2021-08-18 RX ORDER — ASPIRIN 81 MG/1
81 TABLET, COATED ORAL DAILY
Qty: 90 TABLET | Refills: 3 | OUTPATIENT
Start: 2021-08-18

## 2021-08-18 RX ORDER — LEVOTHYROXINE SODIUM 88 UG/1
88 TABLET ORAL
Qty: 90 TABLET | Refills: 0 | Status: SHIPPED | OUTPATIENT
Start: 2021-08-18

## 2021-08-18 RX ORDER — INSULIN DEGLUDEC INJECTION 100 U/ML
INJECTION, SOLUTION SUBCUTANEOUS
Qty: 15 ML | Refills: 0 | Status: SHIPPED | OUTPATIENT
Start: 2021-08-18

## 2021-08-18 RX ORDER — TRAMADOL HYDROCHLORIDE 50 MG/1
50 TABLET ORAL EVERY 6 HOURS PRN
Qty: 30 TABLET | Refills: 0 | Status: SHIPPED | OUTPATIENT
Start: 2021-08-18

## 2021-08-18 RX ORDER — CALCIUM CITRATE/VITAMIN D3 200MG-6.25
TABLET ORAL
Qty: 400 STRIP | Refills: 1 | Status: SHIPPED | OUTPATIENT
Start: 2021-08-18

## 2021-08-18 RX ORDER — ALPRAZOLAM 0.5 MG/1
0.5 TABLET ORAL 3 TIMES DAILY PRN
Qty: 60 TABLET | Refills: 1 | Status: SHIPPED | OUTPATIENT
Start: 2021-08-18

## 2021-08-18 RX ORDER — METOPROLOL SUCCINATE 50 MG/1
50 TABLET, EXTENDED RELEASE ORAL DAILY
Qty: 90 TABLET | Refills: 0 | Status: SHIPPED | OUTPATIENT
Start: 2021-08-18 | End: 2021-12-29

## 2021-08-18 RX ORDER — INSULIN ASPART 100 [IU]/ML
INJECTION, SOLUTION INTRAVENOUS; SUBCUTANEOUS
Qty: 15 ML | Refills: 0 | Status: SHIPPED | OUTPATIENT
Start: 2021-08-18

## 2021-08-18 NOTE — TELEPHONE ENCOUNTER
Patient has not been compliant with getting labs done or following up with diabetic services. Please have her get her labs done today or tomorrow and follow-up with Abraham Arias for insulin refills.   If she is completely out, okay to send her a 1 month sup

## 2021-08-18 NOTE — TELEPHONE ENCOUNTER
Pt was asked to return in 3 months, no appointment scheduled. Last visit was telemed 6/18/21,  Levothyroxine passes protocol last refill 6/11/21 #90  aspirin passed but last refill was 6/18/21 #90 + 3, so this will be denied.    Metoprolol 6/18/21 #90 fail

## 2021-08-20 RX ORDER — NYSTATIN 100000 [USP'U]/G
1 POWDER TOPICAL 3 TIMES DAILY
Qty: 60 G | Refills: 1 | Status: SHIPPED | OUTPATIENT
Start: 2021-08-20 | End: 2021-08-20 | Stop reason: CLARIF

## 2021-08-28 DIAGNOSIS — E11.65 UNCONTROLLED TYPE 2 DIABETES MELLITUS WITH MICROALBUMINURIA, WITHOUT LONG-TERM CURRENT USE OF INSULIN (HCC): ICD-10-CM

## 2021-08-28 DIAGNOSIS — E11.29 UNCONTROLLED TYPE 2 DIABETES MELLITUS WITH MICROALBUMINURIA, WITHOUT LONG-TERM CURRENT USE OF INSULIN (HCC): ICD-10-CM

## 2021-08-28 DIAGNOSIS — R80.9 UNCONTROLLED TYPE 2 DIABETES MELLITUS WITH MICROALBUMINURIA, WITHOUT LONG-TERM CURRENT USE OF INSULIN (HCC): ICD-10-CM

## 2021-08-28 DIAGNOSIS — I10 ESSENTIAL HYPERTENSION WITH GOAL BLOOD PRESSURE LESS THAN 130/80: ICD-10-CM

## 2021-08-30 RX ORDER — ASPIRIN 81 MG/1
81 TABLET, COATED ORAL DAILY
Qty: 90 TABLET | Refills: 3 | OUTPATIENT
Start: 2021-08-30

## 2021-09-20 ENCOUNTER — TELEPHONE (OUTPATIENT)
Dept: ENDOCRINOLOGY CLINIC | Facility: CLINIC | Age: 51
End: 2021-09-20

## 2021-09-20 NOTE — TELEPHONE ENCOUNTER
Numerous attempts have been made to schedule pt for f/u with diabetes provider. She hasn't seen a diabetes provider since May 2020. Dismissal letter sent tracking # [id number]. Diabetes care being returned to pcp.

## 2021-09-20 NOTE — TELEPHONE ENCOUNTER
Noted. Call to pt. Advised pt that she has been dismissed from diabetes provider due to numerous failed attempts to schedule pt. Pt sts she was not aware of this.      Appt scheduled with  on 09/23/21

## 2021-09-20 NOTE — TELEPHONE ENCOUNTER
Please see if patient responds to phone call. She is overdue for diabetes appt and has been dismissed from diabetic services because of no response. Please schedule office visit asap.

## 2021-11-18 RX ORDER — INSULIN ASPART 100 [IU]/ML
INJECTION, SOLUTION INTRAVENOUS; SUBCUTANEOUS
Qty: 15 ML | Refills: 0 | OUTPATIENT
Start: 2021-11-18

## 2021-11-18 RX ORDER — INSULIN DEGLUDEC INJECTION 100 U/ML
INJECTION, SOLUTION SUBCUTANEOUS
Qty: 15 ML | Refills: 0 | OUTPATIENT
Start: 2021-11-18

## 2021-12-28 DIAGNOSIS — I10 ESSENTIAL HYPERTENSION WITH GOAL BLOOD PRESSURE LESS THAN 130/80: ICD-10-CM

## 2021-12-29 RX ORDER — METOPROLOL SUCCINATE 50 MG/1
TABLET, EXTENDED RELEASE ORAL
Qty: 30 TABLET | Refills: 0 | Status: SHIPPED | OUTPATIENT
Start: 2021-12-29

## 2021-12-29 NOTE — TELEPHONE ENCOUNTER
LOV: 6/18/21  for: HTN  Patient advised to RTC on:  3 months    metoprolol succinate 50 MG Oral Tablet 24 Hr 90 tablet 0 8/18/2021    Sig:   Take 1 tablet (50 mg total) by mouth daily. [facility] sent to make a follow up appt.

## 2022-03-07 ENCOUNTER — TELEPHONE (OUTPATIENT)
Dept: FAMILY MEDICINE CLINIC | Facility: CLINIC | Age: 52
End: 2022-03-07

## 2022-03-11 NOTE — TELEPHONE ENCOUNTER
LOV: 6/18/21  for: med check  Patient advised to RTC on:  3 months    METOPROLOL SUCCINATE 50 MG Oral Tablet 24 Hr 30 tablet 0 12/29/2021    Sig: Bia Villarreal 1 TABLET(50 MG) BY MOUTH DAILY       Please call pt to make an appt.

## 2022-03-23 RX ORDER — INSULIN ASPART 100 [IU]/ML
INJECTION, SOLUTION INTRAVENOUS; SUBCUTANEOUS
Qty: 15 ML | Refills: 0 | OUTPATIENT
Start: 2022-03-23

## 2022-03-25 RX ORDER — INSULIN ASPART 100 [IU]/ML
INJECTION, SOLUTION INTRAVENOUS; SUBCUTANEOUS
Qty: 15 ML | Refills: 0 | Status: SHIPPED | OUTPATIENT
Start: 2022-03-25

## 2022-03-25 RX ORDER — METOPROLOL SUCCINATE 50 MG/1
TABLET, EXTENDED RELEASE ORAL
Qty: 30 TABLET | Refills: 0 | Status: SHIPPED | OUTPATIENT
Start: 2022-03-25

## 2022-03-25 NOTE — TELEPHONE ENCOUNTER
Pt requesting refill of:     Insulin Aspart Pen (NOVOLOG FLEXPEN) 100 UNIT/ML Subcutaneous Solution Pen-injector    Be sent to:    [facility], [address] [facility], [phone], [phone]

## 2022-03-25 NOTE — TELEPHONE ENCOUNTER
LOV: 6/18/21--telemedicine     NOV: 03/28/22    LF:  Insulin Aspart Pen (NOVOLOG FLEXPEN) 100 UNIT/ML Subcutaneous Solution Pen-injector--> 08/18/21, #15mL, ref 0     Order pended, #15ml, ref 0

## 2022-03-28 NOTE — TELEPHONE ENCOUNTER
Called to pt, advised due for appointment. Advised has not been seen in office since June 2021. Pt states will schedule online.

## 2022-04-12 RX ORDER — LEVOTHYROXINE SODIUM 88 UG/1
TABLET ORAL
Qty: 30 TABLET | Refills: 0 | Status: SHIPPED | OUTPATIENT
Start: 2022-04-12

## 2022-05-15 NOTE — TELEPHONE ENCOUNTER
From: Kuldeep Wilks  Sent: 5/29/2018 10:02 AM CDT  Subject: Medication Renewal Request    Montrell Cardenas.  Nadia Martínez would like a refill of the following medications:     HYDROcodone-acetaminophen  MG Oral Tab EVERARDO Harding]    Preferred pharmacy: [facility]
Medication: Norco    Date of last refill: 4/18/18  [place] 7[place]32/[place]    Last office visit: 4/30/18  Due back to clinic per last office note:  f/u after MRI  Date next office visit scheduled:  6/5/18    Last OV note recomme
Patient picked up rx, verified id # [id number]
no

## 2022-08-22 ENCOUNTER — TELEPHONE (OUTPATIENT)
Dept: FAMILY MEDICINE CLINIC | Facility: CLINIC | Age: 52
End: 2022-08-22

## 2022-12-22 ENCOUNTER — TELEPHONE (OUTPATIENT)
Dept: FAMILY MEDICINE CLINIC | Facility: CLINIC | Age: 52
End: 2022-12-22

## 2022-12-22 DIAGNOSIS — E03.9 ACQUIRED HYPOTHYROIDISM: ICD-10-CM

## 2022-12-22 DIAGNOSIS — E11.65 UNCONTROLLED TYPE 2 DIABETES MELLITUS WITH HYPERGLYCEMIA (HCC): ICD-10-CM

## 2022-12-22 RX ORDER — LEVOTHYROXINE SODIUM 88 UG/1
88 TABLET ORAL
Qty: 30 TABLET | Refills: 0 | OUTPATIENT
Start: 2022-12-22

## 2022-12-22 NOTE — TELEPHONE ENCOUNTER
Received a a call from [address] [place] [address] to refill Levothyroxine and Metformin per pt's request. Pt has not seen PCP since 6/2021. Called the pt and left a message to schedule a follow up appointment with Dr. Alix Monteiro. Did not pass protocol  Last office visit 6/23/21  Last refill was: Levothyroxine 4/12/22, 30 tabs;  Metformin 180 tabs 8/18/2021  Next appointment: none    Please sign pended medication if appropriate

## 2022-12-23 ENCOUNTER — TELEPHONE (OUTPATIENT)
Dept: FAMILY MEDICINE CLINIC | Facility: CLINIC | Age: 52
End: 2022-12-23

## 2022-12-27 NOTE — TELEPHONE ENCOUNTER
Call to [facility] pharmacy/marcela/certified tech-advised pt has not been seen in > 18 months. Attempts to have pt schedule appt have been unsuccessful. Dr nat Saucedo he will no longer fill meds unless pt is seen every 3-6 months. Farrah Son she has noted this in pt's record. Denia VILLALOBOS sent pt IPXhart messg today requesting call back to schedule appt.

## 2022-12-27 NOTE — TELEPHONE ENCOUNTER
Call from [facility]-sts following up on refill request sent 12/22/22 for levothyroxine and metformin. Also sts spoke w someone in our ofc re this on that same date. No electronic or other refill requests for these meds seen in record. Advised will review refill requests today. Last visit was virtual med check 6/18/21-Notes state diabetes managed by diabetic services. Recheck thyroid labs and follow up 3 months. Levothyroxine last ordered 4/12/22 #30 w no RF  Last thyroid levels and cmp 7/25/21  Metformin last ordered 8/18/21 #180 no RF    9/20/21 note shows dismissed from diabetes services due to no response to requests for appt-last seen May 2020  Pt scheduled appt w dr johns 9/23/21 and no showed appt. No appts since or scheduled. Per dr Porter Friday note in 12/22/22 refill encounter:  \"Patient way overdue for appt and will no longer fill her meds if not seen every 3-6 months. \"

## 2022-12-28 NOTE — TELEPHONE ENCOUNTER
Agree with plan. I cannot effectively treat her conditions unless she is seen every 3-6 months. No further refills until seen in office.

## 2023-01-17 ENCOUNTER — PATIENT OUTREACH (OUTPATIENT)
Dept: FAMILY MEDICINE CLINIC | Facility: CLINIC | Age: 53
End: 2023-01-17

## 2023-01-17 DIAGNOSIS — E11.65 UNCONTROLLED TYPE 2 DIABETES MELLITUS WITH HYPERGLYCEMIA (HCC): Primary | ICD-10-CM

## 2023-03-13 ENCOUNTER — TELEPHONE (OUTPATIENT)
Facility: CLINIC | Age: 53
End: 2023-03-13

## 2023-03-13 NOTE — TELEPHONE ENCOUNTER
Patient needs to schedule with an MD. BANDAR to reschedule. Cancelled appointment for 3/13/2023 with ARTEMIO.

## 2023-05-03 ENCOUNTER — OFFICE VISIT (OUTPATIENT)
Dept: OBGYN CLINIC | Facility: CLINIC | Age: 53
End: 2023-05-03
Payer: MEDICAID

## 2023-05-03 VITALS
HEART RATE: 87 BPM | DIASTOLIC BLOOD PRESSURE: 90 MMHG | HEIGHT: 65 IN | WEIGHT: 209.44 LBS | BODY MASS INDEX: 34.89 KG/M2 | SYSTOLIC BLOOD PRESSURE: 154 MMHG

## 2023-05-03 DIAGNOSIS — D12.6 TUBULOVILLOUS ADENOMA OF COLON: ICD-10-CM

## 2023-05-03 DIAGNOSIS — K63.5 DYSPLASTIC POLYP OF COLON: ICD-10-CM

## 2023-05-03 DIAGNOSIS — N90.4 LICHEN SCLEROSUS ET ATROPHICUS OF THE VULVA: ICD-10-CM

## 2023-05-03 DIAGNOSIS — B37.2 CANDIDAL INTERTRIGO: ICD-10-CM

## 2023-05-03 DIAGNOSIS — Z01.419 ENCOUNTER FOR ANNUAL ROUTINE GYNECOLOGICAL EXAMINATION: Primary | ICD-10-CM

## 2023-05-03 PROCEDURE — 3080F DIAST BP >= 90 MM HG: CPT | Performed by: STUDENT IN AN ORGANIZED HEALTH CARE EDUCATION/TRAINING PROGRAM

## 2023-05-03 PROCEDURE — 99396 PREV VISIT EST AGE 40-64: CPT | Performed by: STUDENT IN AN ORGANIZED HEALTH CARE EDUCATION/TRAINING PROGRAM

## 2023-05-03 PROCEDURE — 3008F BODY MASS INDEX DOCD: CPT | Performed by: STUDENT IN AN ORGANIZED HEALTH CARE EDUCATION/TRAINING PROGRAM

## 2023-05-03 PROCEDURE — 99214 OFFICE O/P EST MOD 30 MIN: CPT | Performed by: STUDENT IN AN ORGANIZED HEALTH CARE EDUCATION/TRAINING PROGRAM

## 2023-05-03 PROCEDURE — 3077F SYST BP >= 140 MM HG: CPT | Performed by: STUDENT IN AN ORGANIZED HEALTH CARE EDUCATION/TRAINING PROGRAM

## 2023-05-03 RX ORDER — FLUCONAZOLE 200 MG/1
TABLET ORAL
Qty: 15 TABLET | Refills: 0 | Status: SHIPPED | OUTPATIENT
Start: 2023-05-03 | End: 2023-05-17

## 2023-05-03 RX ORDER — CLOBETASOL PROPIONATE 0.5 MG/G
OINTMENT TOPICAL
Qty: 60 G | Refills: 3 | Status: SHIPPED | OUTPATIENT
Start: 2023-05-03

## 2023-05-04 ENCOUNTER — TELEPHONE (OUTPATIENT)
Facility: CLINIC | Age: 53
End: 2023-05-04

## 2023-05-04 PROBLEM — D12.6 TUBULOVILLOUS ADENOMA OF COLON: Status: ACTIVE | Noted: 2023-05-04

## 2023-05-04 PROBLEM — K63.5 DYSPLASTIC POLYP OF COLON: Status: ACTIVE | Noted: 2023-05-04

## 2023-05-04 NOTE — TELEPHONE ENCOUNTER
----- Message from Alisha Phipps MD sent at 5/4/2023 11:51 AM CDT -----  Regarding: pt needs to see general surgery  I had not seen this before pt's visit yesterday, but she is overdue for a follow up colonscopy after she had a higher risk colon polyp resected in 1/2021. The pathology showed it was a high grade precancerous polyp and Dr Atif Iglesias recommended repeat colonscopy in 1 year. I placed referral order if needed. Pls inform pt and encourage her to schedule.

## 2023-05-04 NOTE — TELEPHONE ENCOUNTER
Spoke with patient. She is aware she is overdue for a follow up colonscopy after she had a higher risk colon polyp resected in 1/2021. The pathology showed it was a high grade precancerous polyp and Dr Veronica Holland recommended repeat colonscopy in 1 year. I placed referral order if needed. She would like information sent to her [employer]. Lot[employer] message sent as requested. Understanding verbalized.

## 2023-05-06 ENCOUNTER — HOSPITAL ENCOUNTER (OUTPATIENT)
Dept: MAMMOGRAPHY | Age: 53
Discharge: HOME | End: 2023-05-06
Attending: STUDENT IN AN ORGANIZED HEALTH CARE EDUCATION/TRAINING PROGRAM
Payer: MEDICAID

## 2023-05-06 DIAGNOSIS — Z01.419 ENCOUNTER FOR ANNUAL ROUTINE GYNECOLOGICAL EXAMINATION: ICD-10-CM

## 2023-05-06 PROCEDURE — 77067 SCR MAMMO BI INCL CAD: CPT | Performed by: STUDENT IN AN ORGANIZED HEALTH CARE EDUCATION/TRAINING PROGRAM

## 2023-05-06 PROCEDURE — 77063 BREAST TOMOSYNTHESIS BI: CPT | Performed by: STUDENT IN AN ORGANIZED HEALTH CARE EDUCATION/TRAINING PROGRAM

## 2023-08-23 ENCOUNTER — OFFICE VISIT (OUTPATIENT)
Dept: OBGYN CLINIC | Facility: CLINIC | Age: 53
End: 2023-08-23
Payer: MEDICAID

## 2023-08-23 VITALS
WEIGHT: 207.25 LBS | HEART RATE: 71 BPM | SYSTOLIC BLOOD PRESSURE: 156 MMHG | HEIGHT: 65 IN | BODY MASS INDEX: 34.53 KG/M2 | DIASTOLIC BLOOD PRESSURE: 96 MMHG

## 2023-08-23 DIAGNOSIS — K63.5 DYSPLASTIC POLYP OF COLON: ICD-10-CM

## 2023-08-23 DIAGNOSIS — N95.8 GENITOURINARY SYNDROME OF MENOPAUSE: ICD-10-CM

## 2023-08-23 DIAGNOSIS — N90.4 LICHEN SCLEROSUS ET ATROPHICUS OF THE VULVA: Primary | ICD-10-CM

## 2023-08-23 PROCEDURE — 99213 OFFICE O/P EST LOW 20 MIN: CPT | Performed by: STUDENT IN AN ORGANIZED HEALTH CARE EDUCATION/TRAINING PROGRAM

## 2023-08-23 PROCEDURE — 3077F SYST BP >= 140 MM HG: CPT | Performed by: STUDENT IN AN ORGANIZED HEALTH CARE EDUCATION/TRAINING PROGRAM

## 2023-08-23 PROCEDURE — 3008F BODY MASS INDEX DOCD: CPT | Performed by: STUDENT IN AN ORGANIZED HEALTH CARE EDUCATION/TRAINING PROGRAM

## 2023-08-23 PROCEDURE — 3080F DIAST BP >= 90 MM HG: CPT | Performed by: STUDENT IN AN ORGANIZED HEALTH CARE EDUCATION/TRAINING PROGRAM

## 2023-08-23 RX ORDER — ESTRADIOL 0.1 MG/G
CREAM VAGINAL
Qty: 42.5 G | Refills: 3 | Status: SHIPPED | OUTPATIENT
Start: 2023-08-23

## 2023-09-15 NOTE — TELEPHONE ENCOUNTER
Endorsed to  to fax back.

## 2023-11-28 NOTE — TELEPHONE ENCOUNTER
Spoke with Isidra Ruiz who stated [facility] refill will be discussed at next office visit. Patient still needs to complete MRI pituitary. Per Isidra Ruiz if patient is able to complete her MRI prior to 8/13/19 patient can be added to neuro-onc clinic on 8/13/19. Subjective:       Patient ID: Franchesca Wright is a 63 y.o. female.    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 -- MAY POTENTIALLY HAS FOOD- AND- EXERCISE - INDUCED ANAPHYLAXIS / severe cholinergic urticaria    She has a history of allergies to ASA and Radiocontrast    materials.    Also has long standing history of Cholinergic Urticaria, the patient is aware of how they occur and how to prevent and treat it..  She has year round nasal and eye allergies.--- XYZAL 5 MG AT NIGHTS PREVENTS CHOLINERGIC URTICARIA    She has been avoiding shell fish after multiple anaphylaxis after shell fish ingestion.    Not repeated shell fish Immuno CAP in a while- may do it now.  She does not have a personal or family history of asthma or eczema. No ongoing allergens or irritants exposure       Aspirin, Iodine and iodide containing products, Cefotetan, and Shellfish containing products -- allergies were reported.    Past Medical History:   Diagnosis Date    Allergy     Bell palsy     Hypertension     Urticaria        Family History   Problem Relation Age of Onset    Hypertension Mother     Allergies Son        Environmental History: Dust Mite Controls: Dust mite controls are already in place.     Review of Systems   Constitutional: Negative.    HENT:  Positive for congestion and rhinorrhea.    Eyes: Negative.    Respiratory: Negative.     Cardiovascular: Negative.    Gastrointestinal: Negative.    Endocrine: Negative.    Genitourinary: Negative.    Musculoskeletal: Negative.    Skin: Negative.    Allergic/Immunologic: Positive for food allergies.   Neurological: Negative.    Hematological: Negative.    Psychiatric/Behavioral: Negative.       Objective:     There were  no vitals taken for this visit.    Physical Exam  Vitals and nursing note reviewed.   Constitutional:       Appearance: Normal appearance. She is normal weight.   HENT:      Head: Normocephalic and atraumatic.      Right Ear: Tympanic membrane, ear canal and external ear normal.      Left Ear: Tympanic membrane, ear canal and external ear normal.      Nose: Congestion present.      Mouth/Throat:      Mouth: Mucous membranes are moist.      Pharynx: Oropharynx is clear.   Eyes:      Extraocular Movements: Extraocular movements intact.      Conjunctiva/sclera: Conjunctivae normal.      Pupils: Pupils are equal, round, and reactive to light.   Cardiovascular:      Rate and Rhythm: Normal rate and regular rhythm.      Pulses: Normal pulses.      Heart sounds: Normal heart sounds.   Pulmonary:      Effort: Pulmonary effort is normal.      Breath sounds: Normal breath sounds.   Abdominal:      General: Abdomen is flat. Bowel sounds are normal.      Palpations: Abdomen is soft.   Musculoskeletal:         General: Normal range of motion.      Cervical back: Normal range of motion and neck supple.   Skin:     General: Skin is warm and dry.      Capillary Refill: Capillary refill takes less than 2 seconds.   Neurological:      General: No focal deficit present.      Mental Status: She is alert and oriented to person, place, and time. Mental status is at baseline.   Psychiatric:         Mood and Affect: Mood normal.         Behavior: Behavior normal.         Thought Content: Thought content normal.         Judgment: Judgment normal.       Assessment:      1. Chronic idiopathic urticaria    2. Aspirin allergy    3. Shrimp allergy              Allergy to all shell fish.  4        Allergy to ASA and RADIOCONTRAST MATERIALS.  5        SINGULAIR CAUSED PALPITATIONS  6        Cholinergic Urticaria.--- prevented by XYZAL 5 mg daily    Plan:     XYZAL 5 mg.q  PM  Flonase 50 mcg -- 2 sprays per nares qd or bid.  Prednisone 20 mg stat  for severe hives or swelling.  Avoid ASA, Pre treat before RCM administration with Benadryl and prednisone per protocol..  Avoid shell fish in all forms.-- May re test by Immuno CAP  Epipen 1 : 1000---  generic--0.30 mg IM stat for allergic emergency-- and lay done- Repeat dose in 5- 10 minutes, if needed.  May do IgE Immuno CAP to all shell fish.  Had 4 doses of COVID vaccines.  Annual influenza vaccinations.  Yearly follow ups                  Problems Address                                                 Amount and/or Complexity                                                                      Risk       3           [] 2 or more self-limited or minor problems                      [] Limited                                                                        [] Low                  [] 1 stable chronic illness                                                  Any combination of the two                                               OTC drugs                  []Acute, uncomplicated illness or injury                            Review of prior external notes from unique source           Minor surgery with no risk factors                                                                                                               [] 1 []2  []3+                                                                                                              Review of results from each unique test                                                                                                               [] 1 []2  [] 3+                                                                                                              Order of each unique test                                                                                                               [] 1 []2  [] 3+                                                                                                              Or                                                                                                              [] Assessment requiring an independent historian      4            [x] One or more chronic illness with exacerbation,              [x] Moderate                                                                      [x] Moderate                 Progression, or side effects of treatment                            -test documents or independent historians                        Prescription drug management                [x]  2 or more sable chronic illnesses                                    [] Independent interpretation of tests                              Minor surgery with identifiable risk                [] 1 undiagnosed new problem with uncertain prognosis    [] Discussion or management of test results                    elective major surgery                [] 1 acute illness with                systemic symptoms                                                                                                                                                              [] 1 acute complicated injury                                                                                                                                          Elective major surgery                                                                                                                                                                                                                                                                                                                                                                                                  5            [] 1 or more chronic illnesses with severe exacerbation,     [] Extensive(two from below)                                         [] High                                                                                                               [] Independent interpretation of results                          Drug therapy requiring intensive                                                                                                               []Discussion of management or test interpretation           monitoring                                                                                                                                                                                                       Decision to de-escalate care                 [] 1 acute or chronic illness or injury that poses a threat                                                                                               Decision regarding hospitalization

## 2023-12-26 NOTE — TELEPHONE ENCOUNTER
History     Chief Complaint:  Pharyngitis       HPI   Ron House is a 43 year old male with a history of who presents to there ED today due to throat discomfort. The patient has had 4 days of a sore throat and he was seen at a clinic for it. They discharged him Augmentin, but his symptoms have gotten worse so he came into the ED today for further evaluation. The patient notes that it is painful to swallow.  No drooling or stridor.  He has taken ibuprofen at 1430 and Tylenol while in the waiting room. The patient denies fever, neck stiffness, cough, and shortness of breath.  No GI symptoms.    Independent Historian:    None- The Patient only     Review of External Notes:  None    Medications:    Augmentin     Past Medical History:    The patient denies a past medical history.     Physical Exam   Patient Vitals for the past 24 hrs:   BP Temp Temp src Pulse Resp SpO2   12/26/23 1932 133/74 -- -- 84 20 99 %   12/26/23 1748 126/77 -- -- 72 -- 100 %   12/26/23 1530 (!) 143/92 98.5  F (36.9  C) Oral 96 16 100 %   12/26/23 1528 -- 98.5  F (36.9  C) Oral -- -- --      Physical Exam  General: Nontoxic-appearing adult male.  Normal body habitus.  HENT:   Head: Atraumatic.  Mouth/Throat: Mucous membranes.  Bilateral tonsils erythematous and symmetrically edematous with white exudate.  Uvula is midline.  There is no drooling or stridor.  No hot potato voice.  No muffled voice.  Eyes: Conjunctive and EOM normal.   Neck: Normal ROM. No rigidity.  No pain with neck extension.  Resp: Normal respiratory effort. No stridor or cough observed.  MSK: Normal range of motion.  Skin: Warm and dry.  Neuro: Awake, alert.  Psych: Normal mood and affect.    Emergency Department Course     Laboratory:  Labs Ordered and Resulted from Time of ED Arrival to Time of ED Departure   MONONUCLEOSIS SCREEN - Abnormal       Result Value    Mononucleosis Screen Positive (*)    INFLUENZA A/B, RSV, & SARS-COV2 PCR - Normal    Influenza A PCR  Discussed with Yohan MCGEE and patient Ok to return to work effective 3/12/19 without restrictions. New letter drafted and pending provider signature.      To fax to [employer] of human resources , Shakira BREWSTER once signed @ fax #[phone] Negative      Influenza B PCR Negative      RSV PCR Negative      SARS CoV2 PCR Negative     GROUP A STREPTOCOCCUS PCR THROAT SWAB - Normal    Group A strep by PCR Not Detected     CHLAMYDIA TRACHOMATIS/NEISSERIA GONORRHOEAE BY PCR      Emergency Department Course & Assessments:    Interventions:  Medications   acetaminophen (TYLENOL) tablet 1,000 mg (1,000 mg Oral $Given 12/26/23 1537)   dexAMETHasone (DECADRON) alcohol-free oral solution 10 mg (10 mg Oral $Given 12/26/23 1707)      Assessments:  1650 I obtained history and examined the patient as noted above.  1858 I rechecked the patient's status and informed them of the findings.   1927 I rechecked the patient's status and informed them of the findings.     Independent Interpretation (X-rays, CTs, rhythm strip):  None    Consultations/Discussion of Management or Tests:  None    Social Determinants of Health affecting care:  None     Disposition:  The patient was discharged to home.     Impression & Plan    CMS Diagnoses: None    Medical Decision Making:  Ron House is a 43 year old male who presents for evaluation of a sore throat.  On arrival here he is afebrile and nontoxic-appearing.  A broad infection differential diagnosis was considered.  Signs and symptoms are consistent with mononucleosis with positive heterophile test plus suggestive signs and symptoms.  Strep test negative.  Gonorrhea swab pending and patient will get a result if this is positive, however doubt this.  COVID, flu and RSV swabs negative.  Discussed complications and need for close follow up.  ENT referral provided.  No contact sports till cleared and discussed splenomegaly.  Additionally, patient needed primary care referral and I placed this.  At this juncture there is no sign of epiglottitis, retropharyngeal abscess or peritonsillar abscess.  I did provide a dose of Decadron here.  Additional supportive cares such as salt water gargles and ibuprofen were  discussed.    Diagnosis:    ICD-10-CM    1. Other infectious mononucleosis without complication  B27.80 Primary Care Referral     Adult ENT  Referral           Scribe Disclosure:    I, Ra Mitchell, am serving as a scribe at 5:04 PM on 12/26/2023 to document services personally performed by Cori Puente PA-C based on my observations and the provider's statements to me.    12/26/2023   Cori Puente PA-C Dewing, Jennifer C, PA-C  12/26/23 5043

## 2024-05-16 NOTE — TELEPHONE ENCOUNTER
Call to pt-advised of info noted below from dr marx's ofc. Informed fasting lab and ekg orders placed by dr johns.     Pt confirms is scheduled for surgery 1/20-already has lab appt for covid testing scheduled for Sunday 1/17,  sts will call central schedul Pt called to r/s his 5/20 appt as his wife is having an important dental procedure done that day. Please call [phone]

## (undated) DIAGNOSIS — I10 ESSENTIAL HYPERTENSION WITH GOAL BLOOD PRESSURE LESS THAN 130/80: ICD-10-CM

## (undated) DIAGNOSIS — E03.9 ACQUIRED HYPOTHYROIDISM: Primary | ICD-10-CM

## (undated) DEVICE — SUTURE CHROMIC GUT 4-0 RB-1

## (undated) DEVICE — SUTURE VLOC 90 3-0 9\" 0644

## (undated) DEVICE — ENDOPATH ULTRA VERESS INSUFFLATION NEEDLES WITH LUER LOCK CONNECTORS: Brand: ENDOPATH

## (undated) DEVICE — GAMMEX® PI HYBRID SIZE 7.5, STERILE POWDER-FREE SURGICAL GLOVE, POLYISOPRENE AND NEOPRENE BLEND: Brand: GAMMEX

## (undated) DEVICE — #10 STERILE BLADE: Brand: POLYMER COATED BLADES

## (undated) DEVICE — GLOVE ORTHO ALOETOUCH SZ 8-1/2

## (undated) DEVICE — GAMMEX® NON-LATEX PI TEXTURED SIZE 7.5, STERILE POLYISOPRENE POWDER-FREE SURGICAL GLOVE: Brand: GAMMEX

## (undated) DEVICE — SUTURE VICRYL 0

## (undated) DEVICE — TUBING 1895522 5PK STRAIGHTSHOT TO XPS: Brand: STRAIGHTSHOT®

## (undated) DEVICE — SPECIMEN CONTAINER,POSITIVE SEAL INDICATOR, OR PACKAGED: Brand: PRECISION

## (undated) DEVICE — REDUCER: Brand: ENDOWRIST

## (undated) DEVICE — FLOSEAL HEMOSTATIC MATRIX, 5ML: Brand: FLOSEAL HEMOSTATIC MATRIX

## (undated) DEVICE — TIP COVER ACCESSORY

## (undated) DEVICE — REM POLYHESIVE ADULT PATIENT RETURN ELECTRODE: Brand: VALLEYLAB

## (undated) DEVICE — SPLINT 1524050 5PK PAIR DOYLE II AIRWAY: Brand: DOYLE II ™

## (undated) DEVICE — STERILE SYNTHETIC POLYISOPRENE POWDER-FREE SURGICAL GLOVES WITH HYDROGEL COATING, SMOOTH FINISH, STRAIGHT FINGER: Brand: PROTEXIS

## (undated) DEVICE — GLOVE SURG SENSICARE SZ 6-1/2

## (undated) DEVICE — LIGHT HANDLE

## (undated) DEVICE — SUTURE MONOCRYL 4-0 PS-2

## (undated) DEVICE — SUTURE PLAIN GUT 4-0 SC-1

## (undated) DEVICE — KENDALL SCD EXPRESS SLEEVES, KNEE LENGTH, MEDIUM: Brand: KENDALL SCD

## (undated) DEVICE — GOWN,SIRUS,FABRIC-REINFORCED,X-LARGE: Brand: MEDLINE

## (undated) DEVICE — TIP-UP FENESTRATED GRASPER: Brand: ENDOWRIST

## (undated) DEVICE — DURAL SEALANT: Type: IMPLANTABLE DEVICE | Site: NOSE | Status: NON-FUNCTIONAL

## (undated) DEVICE — Device

## (undated) DEVICE — CODMAN® SURGICAL PATTIES 1/4" X 1/4" (0.64CM X 0.64CM): Brand: CODMAN®

## (undated) DEVICE — LIGAMAX 5 MM ENDOSCOPIC MULTIPLE CLIP APPLIER: Brand: LIGAMAX

## (undated) DEVICE — PAD SACRAL SPAN AID

## (undated) DEVICE — APPLCTR ENDO FLOSEAL DISP

## (undated) DEVICE — BLADE 1884080EM TRICUT 4MMX13CM M4 ROHS: Brand: FUSION®

## (undated) DEVICE — GAUZE SPONGES,USP TYPE VII GAUZE, 12 PLY: Brand: CURITY

## (undated) DEVICE — BANDAID COVERLET 1X3

## (undated) DEVICE — UNDYED BRAIDED (POLYGLACTIN 910), SYNTHETIC ABSORBABLE SUTURE: Brand: COATED VICRYL

## (undated) DEVICE — LAP CHOLE/APPY CDS-LF: Brand: MEDLINE INDUSTRIES, INC.

## (undated) DEVICE — SYRINGE 20CC LL TIP

## (undated) DEVICE — MONOPOLAR CURVED SCISSORS: Brand: ENDOWRIST

## (undated) DEVICE — 4.0MM COARSE DIAMOND

## (undated) DEVICE — TROCAR: Brand: KII FIOS FIRST ENTRY

## (undated) DEVICE — ENDO POUCH

## (undated) DEVICE — CHLORAPREP 26ML APPLICATOR

## (undated) DEVICE — 450 ML BOTTLE OF 0.05% CHLORHEXIDINE GLUCONATE IN 99.95% STERILE WATER FOR IRRIGATION, USP AND APPLICATOR.: Brand: IRRISEPT ANTIMICROBIAL WOUND LAVAGE

## (undated) DEVICE — 40580 - THE PINK PAD - ADVANCED TRENDELENBURG POSITIONING KIT: Brand: 40580 - THE PINK PAD - ADVANCED TRENDELENBURG POSITIONING KIT

## (undated) DEVICE — NON-ADHERENT PAD PREPACK: Brand: TELFA

## (undated) DEVICE — INSTRUMENT TRACKER 9733533XOM ENT 1PK

## (undated) DEVICE — 3M(TM) TEGADERM(TM) TRANSPARENT FILM DRESSING FRAME STYLE 9505W: Brand: 3M™ TEGADERM™

## (undated) DEVICE — SINUS CDS: Brand: MEDLINE INDUSTRIES, INC.

## (undated) DEVICE — STAPLER 60 RELOAD BLUE: Brand: SUREFORM

## (undated) DEVICE — SUTURE SILK 3-0 SH

## (undated) DEVICE — REMOVER DURAPREP 3M

## (undated) DEVICE — SPCMN DTCHBLE POUCH 5X7 500ML

## (undated) DEVICE — NEEDLE SPINAL 22X5 405148

## (undated) DEVICE — BIPOLAR FORCEPS INSERT, 2 MM, 20 CM: Brand: TAKE-APART

## (undated) DEVICE — ROBOTIC GENERAL: Brand: MEDLINE INDUSTRIES, INC.

## (undated) DEVICE — STANDARD HYPODERMIC NEEDLE,POLYPROPYLENE HUB: Brand: MONOJECT

## (undated) DEVICE — ENDOPATH XCEL BLUNT TIP TROCARS WITH SMOOTH SLEEVES: Brand: ENDOPATH XCEL

## (undated) DEVICE — SUTURE PDS II 0 CT-1

## (undated) DEVICE — VIOLET BRAIDED (POLYGLACTIN 910), SYNTHETIC ABSORBABLE SUTURE: Brand: COATED VICRYL

## (undated) DEVICE — ARM DRAPE

## (undated) DEVICE — SUTURE PROLENE 2-0 CT-2

## (undated) DEVICE — PAIN TRAY: Brand: MEDLINE INDUSTRIES, INC.

## (undated) DEVICE — 3M™ TEGADERM™ TRANSPARENT FILM DRESSING, 1626W, 4 IN X 4-3/4 IN (10 CM X 12 CM), 50 EACH/CARTON, 4 CARTON/CASE: Brand: 3M™ TEGADERM™

## (undated) DEVICE — DISPOSABLE BIPOLAR SUCTION COAGULATOR 7 1/2" (19CM) 8 FR: Brand: KIRWAN

## (undated) DEVICE — COLUMN DRAPE

## (undated) DEVICE — TRANSPOSAL ULTRAFLEX DUO/QUAD ULTRA CART MANIFOLD

## (undated) DEVICE — GLOVE SURG TRIUMPH SZ 8

## (undated) DEVICE — PLUMEPORT ACTIV LAPAROSCOPIC SMOKE FILTRATION DEVICE: Brand: PLUMEPORT ACTIVE

## (undated) DEVICE — INSERT, MICRO FORCEPS F/USE W/28164BGL: Brand: N.A.

## (undated) DEVICE — 2, DISPOSABLE SUCTION/IRRIGATOR WITHOUT DISPOSABLE TIP: Brand: STRYKEFLOW

## (undated) DEVICE — CAUTERY CORD BIPOLAR YASARGIL

## (undated) DEVICE — FENESTRATED BIPOLAR FORCEPS: Brand: ENDOWRIST

## (undated) DEVICE — DISPOSABLE IRRIGATION CASSETTE: Brand: CORE

## (undated) DEVICE — SYRINGE 30ML LL TIP

## (undated) DEVICE — MARKER SKIN 2 TIP

## (undated) DEVICE — BLADELESS OBTURATOR: Brand: WECK VISTA

## (undated) DEVICE — NEEDLE SPINAL 22X3-1/2 BLK

## (undated) DEVICE — ENDOPATH XCEL WITH OPTIVIEW TECHNOLOGY BLADELESS TROCARS WITH STABILITY SLEEVES: Brand: ENDOPATH XCEL OPTIVIEW

## (undated) DEVICE — CUSH POSI ELBO ULN NRV 22X11X8

## (undated) DEVICE — CANNULA SEAL

## (undated) DEVICE — SINU FOAM: Brand: SINU-FOAM

## (undated) DEVICE — SHEATH 1912008 5PK 4MM/0DEG WOLFE: Brand: ENDO-SCRUB®

## (undated) DEVICE — SEAL

## (undated) DEVICE — VESSEL SEALER EXTEND: Brand: ENDOWRIST

## (undated) DEVICE — PATIENT TRACKER 9734887XOM NON-INVASIVE

## (undated) DEVICE — DERMABOND LIQUID ADHESIVE

## (undated) DEVICE — CATH IV 14G X 5-1/4 ANGIO

## (undated) DEVICE — STAPLER 60: Brand: SUREFORM

## (undated) DEVICE — INSUFFLATION NEEDLE TO ESTABLISH PNEUMOPERITONEUM.: Brand: INSUFFLATION NEEDLE

## (undated) DEVICE — STAPLER 60 RELOAD GREEN: Brand: SUREFORM

## (undated) DEVICE — SOL  .9 1000ML BTL

## (undated) DEVICE — VISUALIZATION SYSTEM: Brand: CLEARIFY

## (undated) NOTE — LETTER
Date: 4/13/2018    Patient Name: Jamey Colon          To Whom it may concern: This letter has been written at the patient's request. The above patient was seen at the [facility] for treatment of a medical condition.     This patient shoul

## (undated) NOTE — LETTER
5/23/2018    [address] [id number]    Dear Ms. Arias,     [address] [facility] office has been trying to contact you to schedule a visit with your doctor to address important healthcare needs.   It is important that you contact our office at yo

## (undated) NOTE — ED AVS SNAPSHOT
Peggy Colunga   MRN: [id number]    Department:  [facility] Emergency Department   Date of Visit:  10/13/2018           Disclosure     Insurance plans vary and the physician(s) referred by the ER may not be covered by your plan.  Please contact your tell this physician (or your personal doctor if your instructions are to return to your personal doctor) about any new or lasting problems. The primary care or specialist physician will see patients referred from the [facility] Emergency Department.  Janneth Noe

## (undated) NOTE — LETTER
Date: 2019      Patient Name: Nigel Salcedo  : 1/3/1970        To Whom it may concern: This letter has been written at the patient's request. The above patient was seen at the [facility] for treatment of a medical condition.     Zulay

## (undated) NOTE — LETTER
Date: 6/11/2019    Patient Name: Cristy Cueto          To Whom it may concern: This letter has been written at the patient's request. The above patient was seen at the [facility] for treatment of a medical condition.   Please allow the pat

## (undated) NOTE — LETTER
19    RE: Luis Connor     : 1/3/1970    Dear Dr. Raul Martins ,    This letter is to inform you that your patient is being scheduled for surgery with Dr. Carlos Abel and Dr Martin Bartholomew  on 19 at [facility]. We have asked the patient to contact your offi

## (undated) NOTE — LETTER
[facility] [address], [address] [facility]    Consent for Anesthesia   1.    Guillaumesharad Jones agree to be cared for by an anesthesiologist, who is specially trained to monitor me and give me medicine to put me to sleep or keep me comfortabl vision, nerves, or muscles and in extremely rare instances death. 5. My doctor has explained to me other choices available to me for my care (alternatives).   6. Pregnant Patients (“epidural”):  I understand that the risks of having an epidural (medicine g

## (undated) NOTE — LETTER
Personal and Confidential  Via Certified Mail    Aga Harding [address] [facility] [id number]    Dear Aga Coffey    Please accept this correspondence as notice that the  [facility] will no longer provide diabetes management servi

## (undated) NOTE — LETTER
[facility] [address] [facility]    Consent for Anesthesia   1.    Guillaume Jones agree to be cared for by an anesthesiologist, who is specially trained to monitor me and give me medicine to put me to sleep or keep me comfortabl vision, nerves, or muscles and in extremely rare instances death. 5. My doctor has explained to me other choices available to me for my care (alternatives).   6. Pregnant Patients (“epidural”):  I understand that the risks of having an epidural (medicine g

## (undated) NOTE — LETTER
[facility]  Migel Martin 59 [address] [facility], [address] [place] [address]    Consent for Operation    Date: ________5/23/17__________    Time: ___1203____________    1.  I authorize the performance upon Peggy Colunga the following operation:    Procedure(s):  LAPAROSCOPI procedure has been videotaped, the surgeon will obtain the original videotape. The hospital will not be responsible for storage or maintenance of this tape.     6. For the purpose of advancing medical education, I consent to the admittance of observers to t STATEMENTS REQUIRING INSERTION OR COMPLETION WERE FILLED IN.     Signature of Patient:   ___________________________    When the patient is a minor or mentally incompetent to give consent:  Signature of person authorized to consent for patient: ____________ drugs/illegal medications). Failure to inform my anesthesiologist about these medicines may increase my risk of anesthetic complications. · If I am allergic to anything or have had a reaction to anesthesia before.     3. I understand how the anesthesia med I have discussed the procedure and information above with the patient (or patient’s representative) and answered their questions. The patient or their representative has agreed to have anesthesia services.     _______________________________________________

## (undated) NOTE — LETTER
18          Zondra Hammans  :  1/3/1970      To Whom It May Concern: This patient was seen in our office on 18 . Work status: 2018 return to work regular duty.       If this office may be of further assistance, please do not hesit

## (undated) NOTE — LETTER
Date: 10/3/2018    Patient Name: Ozzie Palacios          To Whom it may concern: This letter has been written at the patient's request. The above patient was seen at the [facility] for evaluation of acute condition today.    Patient reported

## (undated) NOTE — ED AVS SNAPSHOT
[facility] Emergency Department    [place]  [address] [address]    Phone:  [phone]    Fax:   [facility]   MRN: [id number]    Department:  [facility] Emergency Department   Date of Visit:  1/16/2 START taking these medications     HYDROcodone-acetaminophen 5-325 MG Tabs   Quantity:  10 tablet   Commonly known as:  NORCO   Take 1 tablet by mouth every 6 (six) hours as needed for Pain.        Ketorolac Tromethamine 10 MG Tabs   Quantity:  20 tablet visit does not uncover every injury or illness.  If you have been referred to a primary care or a specialist physician for a follow-up visit, please tell this physician (or your personal doctor if your instructions are to return to your personal doctor) [address] [facility] ([address]) [phone]                Additional Information       We are concerned for your overall well being:    - If you are a smoker or have smoked in the last 12 months, we encourage you to explore options for [facility]ALLE[facility] Now link in the [employer]. Enter your [employer] Activation Code exactly as it appears below along with your Zip Code and Date of Birth to complete the sign-up process. If you do not sign up before the expiration date, you must request a new code.     Michelle Quinones

## (undated) NOTE — LETTER
Date: 3/19/2019    Patient Name: Gema Phipps          To Whom it may concern: This letter has been written at the patient's request. The above patient was seen at the [facility] for treatment of a medical condition.     This patient shoul

## (undated) NOTE — LETTER
19    RE: Teo Vargas     : 1/3/1970    Dear Dr. Rayo Rosado,    Cardiac clearance is being requested for surgery. Your patient is being scheduled for TRANSPENOIDAL HYPOPHYSECTOMY on 19 with Dr. Debbie Foley and Dr Erwin Rider.      Pre-op labs will be done a

## (undated) NOTE — LETTER
[address] [place] [id number]    Dear Ms Saucedo Mention    Thankfully nothing concerning was found on your recent CT scan. You do have a couple of stable breast nodules for which you should follow up with your primary care doctor.   Good

## (undated) NOTE — LETTER
Date: 11/17/2020    Patient Name: Nigel Salcedo          To Whom it may concern: This letter has been written at the patient's request. The above patient was seen at the [facility] for treatment of a medical condition.  The above patient wa

## (undated) NOTE — ED AVS SNAPSHOT
Leatha Girard   MRN: [id number]    Department:  [facility] Emergency Department   Date of Visit:  5/17/2018           Disclosure     Insurance plans vary and the physician(s) referred by the ER may not be covered by your plan.  Please contact your tell this physician (or your personal doctor if your instructions are to return to your personal doctor) about any new or lasting problems. The primary care or specialist physician will see patients referred from the [facility] Emergency Department.  Dorian Martines

## (undated) NOTE — LETTER
Date: 2/22/2019    Patient Name: Koki Noguera          To Whom it may concern: This letter has been written at the patient's request. The above patient was seen at the [facility] for treatment of a medical condition on 2/21/19.     This pa

## (undated) NOTE — LETTER
Milton Ramirez, :1/3/1970    CONSENT FOR PROCEDURE/SEDATION    1. I authorize the performance upon Milton Ramirez  the following: Vulvar Biopsy    2.  I authorize Dr. Noel Jade MD (and whomever is designated as the doctor’s assistant), to perform the a Witness: _________________________________________ Date:___________     Physician Signature: _______________________________ Date:___________

## (undated) NOTE — LETTER
[address]  [facility] [address] [id number]    Dear Ms Raisa Gilman    Thankfully nothing concerning was found on your recent CT scan. You do have a couple of stable breast nodules for which you should follow up with your primary care doctor.   Good

## (undated) NOTE — LETTER
10/12/18          Teo Vargas  :  1/3/1970      To Whom It May Concern: This patient was seen in our office on 10/12/18 . Work status:  Please excuse from work 10/10/18 through 10/15/18 due to spinal flare up.   May return to work status per above

## (undated) NOTE — ED AVS SNAPSHOT
Jamey Colon   MRN: [id number]    Department:  [facility] Emergency Department   Date of Visit:  1/22/2019           Disclosure     Insurance plans vary and the physician(s) referred by the ER may not be covered by your plan.  Please contact your tell this physician (or your personal doctor if your instructions are to return to your personal doctor) about any new or lasting problems. The primary care or specialist physician will see patients referred from the [facility] Emergency Department.  Liban Remy

## (undated) NOTE — LETTER
Date: 11/7/2018    Patient Name: Milton Ramirez          To Whom it may concern: This letter has been written at the patient's request. The above patient was seen at the [facility] for treatment of a medical condition.     This patient shoul

## (undated) NOTE — LETTER
Date: 2019      Patient Name: Nica Galeano   : 1/3/1970        To Whom it may concern: This letter has been written at the patient's request. The above patient was seen at the [facility] for treatment of a medical condition.

## (undated) NOTE — LETTER
9/11/2019        Haydee Adames  1/3/1970        To Whom it may concern: This letter has been written at the patient's request. The above patient has been seen at the [facility] for treatment of a medical condition.     Patient has

## (undated) NOTE — ED AVS SNAPSHOT
Haydee Adames   MRN: [id number]    Department:  [facility] Emergency Department   Date of Visit:  12/3/2017           Disclosure     Insurance plans vary and the physician(s) referred by the ER may not be covered by your plan.  Please contact your tell this physician (or your personal doctor if your instructions are to return to your personal doctor) about any new or lasting problems. The primary care or specialist physician will see patients referred from the [facility] Emergency Department.  José Luis Reis

## (undated) NOTE — LETTER
19          Gosia Gandara  :  1/3/1970      To Whom It May Concern:     This letter has been written at the patient's request. The above patient was seen at the [facility] for treatment of a medical condition.     Gosia Gandara is jovanni

## (undated) NOTE — LETTER
18          Nigel Salcedo  :  1/3/1970      To Whom It May Concern: This patient was seen in our office on 18 .   Work status: off work until further notice    If this office may be of further assistance, please do not hesitate to contact

## (undated) NOTE — IP AVS SNAPSHOT
[facility] [place] [address] [place], [address] ~ [phone]                Discharge Summary   5/23/2017    Cristy Cueto           Admission Information        Provider Department    5/23/2017 Servando Nieves MD  [id number]         [address] Stop taking on:  6/3/2017    Ruthy Scott taking these medications        Instructions Authorizing Provider    Morning Afternoon Evening As Needed    ALPRAZolam 0.25 MG Tabs   Commonly known as:  XANAX        Take • Avoid strenuous activity such as running and physical workouts for 3 weeks. Normal daily activities are fine, such as climbing stairs  • You may shower after 24 hours. The steristrips can get wet but should not be under water in a bath.   • You may return (05/23/17)  8.1 (05/23/17)  4.79 (05/23/17)  13.2 (05/23/17)  39.8 (05/23/17)  83.1 -- -- -- (05/23/17)  264.0 --      Recent Hematology Lab Results (cont.)  (Last 3 results in the past 90 days)    Neutrophil % Lymphocyte % Monocyte % Eosinophil % Eyal can help with your Affordable Care Act coverage, as well as all types of Medicaid plans. To get signed up and covered, please call [phone] and ask to get set up for an insurance coverage that is in-network with Shasta Ramirez temperature, rash, itching, shortness of breath, chills, nausea, and diarrhea           Water Pills     hydrochlorothiazide (HYDRODIURIL) 25 MG Oral Tab       Use: Treat high blood pressure, swelling in legs, too much fluid    Most common side effects: Diz

## (undated) NOTE — MR AVS SNAPSHOT
After Visit Summary   4/16/2021    Rigoberto Ramírez    MRN: [id number]           Visit Information     Date & Time  4/16/2021 11:00 AM Provider  Emilio Davis MD Department  [place] [address], [place] [address], [employer]  Phone  [phone]      Your Vit SYSTEM) w/Device Does not apply Kit 2 (two) times daily. Glucose Blood (ONETOUCH VERIO) In Vitro Strip Use to check blood sugars twice a day before meals.     ONETOUCH DELICA LANCETS 06I Does not apply Misc 1 each by Does not apply route 2 (two) times da 2021   Imaging:   [facility] SEEMA 2D+3D SCREENING BILAT (CPT=77067/77400)    Instructions: To schedule an appointment for your radiology test please call Dana Goodson Scheduling at [phone].                   DM now offers Video Visits through [facility] Average cost  $70*       [facility] – [address]   Monday – Friday  10:00 am – 10:00 pm   Saturday – Sunday  10:00 am – 4:00 pm     [address]   Monday – Friday  4:00 pm – 10:00 pm   Jeremy

## (undated) NOTE — LETTER
December 3, 2017    Patient: Zondra Hammans   Date of Visit: 12/3/2017       To Whom It May Concern:    Jeyson Abraham was seen and treated in our emergency department on 12/3/2017.  She   Can return to work on December the 8th    If you have any questions

## (undated) NOTE — LETTER
03/15/19      Sofie Tekonsha    :  1/3/1970          To Whom It May Concern:       This patient was seen in our office on 19 . Work status: Patient may return to work effective 3/12/19 without restrictions.      If this office may be of further ass

## (undated) NOTE — ED AVS SNAPSHOT
[facility] Emergency Department    [place]  [address] [address]    Phone:  [phone]    Fax:   [facility]   MRN: [id number]    Department:  [facility] Emergency Department   Date of Visit:  1/16/2 IF THERE IS ANY CHANGE OR WORSENING OF YOUR CONDITION, CALL YOUR PRIMARY CARE PHYSICIAN AT ONCE OR RETURN IMMEDIATELY TO THE EMERGENCY DEPARTMENT.     If you have been prescribed any medication(s), please fill your prescription right away and begin taking t

## (undated) NOTE — MR AVS SNAPSHOT
After Visit Summary   1/7/2020    Rigoberto Ramírez    MRN: [id number]           Visit Information     Date & Time  1/7/2020  4:45 PM Provider  Semaj Okeefe, [address], [place], [address]  Phone  [phone]      Your metFORMIN HCl 1000 MG Oral Tab Take 1 tablet (1,000 mg total) by mouth 2 (two) times daily with meals.     HYDROcodone-acetaminophen 5-325 MG Oral Tab Take 1-2 tablets by mouth every 6 (six) hours as needed for Pain.    glipiZIDE 10 MG Oral Tab Take 1 tabl Imaging Scheduling Instructions     Around January 7, 2020   Imaging:   [facility] SEEMA 2D+3D SCREENING BILAT (NOW=[id number])    Instructions: To schedule an appointment for your radiology test please call Lorenzo Golden [address] Scheduling at [phone]. or injury that are   non-life-threatening.   Also available by appointment     Average cost  $70*   [address]   Monday – Friday  10:00 am – 10:00 pm   Saturday – Sunday  10:00

## (undated) NOTE — LETTER
19          Guerline Most  :  1/3/1970      To Whom It May Concern: This patient was seen in our office on 19 . Work status: off work beginning 19 through 3/11/19.   If this office may be of further assistance, please do not hesitate

## (undated) NOTE — LETTER
Date & Time: 4/22/2020, 10:44 AM  Patient: Damien Stanley      To Whom It May Concern:    Joao Zhang was evaluated and treated in our department on 4/22/2020.  She should not return to work until 4/30, patient will be reevaluated at time of next appointme

## (undated) NOTE — LETTER
Date: 10/3/2018    Patient Name: Emely Glaser          To Whom it may concern: This letter has been written at the patient's request. The above patient was seen at the [facility] for evaluation of acute medical condition today.    Patient